# Patient Record
Sex: MALE | Race: WHITE | NOT HISPANIC OR LATINO | Employment: OTHER | URBAN - METROPOLITAN AREA
[De-identification: names, ages, dates, MRNs, and addresses within clinical notes are randomized per-mention and may not be internally consistent; named-entity substitution may affect disease eponyms.]

---

## 2022-06-10 DIAGNOSIS — R27.0 ATAXIA: ICD-10-CM

## 2022-06-10 DIAGNOSIS — R25.2 SPASTICITY: ICD-10-CM

## 2022-06-10 DIAGNOSIS — R26.9 ABNORMAL GAIT: ICD-10-CM

## 2022-06-10 DIAGNOSIS — R56.9 SEIZURE: Primary | ICD-10-CM

## 2022-06-22 DIAGNOSIS — R25.2 SPASTICITY: Primary | ICD-10-CM

## 2022-06-30 ENCOUNTER — HOSPITAL ENCOUNTER (OUTPATIENT)
Dept: RADIOLOGY | Facility: HOSPITAL | Age: 32
Discharge: HOME | End: 2022-06-30
Attending: FAMILY MEDICINE
Payer: MEDICAID

## 2022-06-30 DIAGNOSIS — R27.0 ATAXIA: ICD-10-CM

## 2022-06-30 DIAGNOSIS — R26.9 ABNORMAL GAIT: ICD-10-CM

## 2022-06-30 DIAGNOSIS — R56.9 SEIZURE: ICD-10-CM

## 2022-06-30 DIAGNOSIS — R25.2 SPASTICITY: ICD-10-CM

## 2022-06-30 PROCEDURE — 25500020 PHARM REV CODE 255: Mod: PO

## 2022-06-30 PROCEDURE — 70553 MRI BRAIN STEM W/O & W/DYE: CPT | Mod: TC,PO

## 2022-06-30 PROCEDURE — A9585 GADOBUTROL INJECTION: HCPCS | Mod: PO

## 2022-06-30 RX ORDER — GADOBUTROL 604.72 MG/ML
6.5 INJECTION INTRAVENOUS
Status: COMPLETED | OUTPATIENT
Start: 2022-06-30 | End: 2022-06-30

## 2022-06-30 RX ADMIN — GADOBUTROL 6.5 ML: 604.72 INJECTION INTRAVENOUS at 09:06

## 2022-07-14 ENCOUNTER — HOSPITAL ENCOUNTER (OUTPATIENT)
Dept: RADIOLOGY | Facility: HOSPITAL | Age: 32
Discharge: HOME | End: 2022-07-14
Attending: NURSE PRACTITIONER
Payer: MEDICAID

## 2022-07-14 DIAGNOSIS — R25.2 SPASTICITY: ICD-10-CM

## 2022-07-14 PROCEDURE — 72141 MRI NECK SPINE W/O DYE: CPT | Mod: TC,PO

## 2022-11-08 ENCOUNTER — OFFICE VISIT (OUTPATIENT)
Dept: NEUROSURGERY | Facility: CLINIC | Age: 32
End: 2022-11-08
Payer: MEDICAID

## 2022-11-08 VITALS — DIASTOLIC BLOOD PRESSURE: 87 MMHG | SYSTOLIC BLOOD PRESSURE: 136 MMHG | HEART RATE: 62 BPM

## 2022-11-08 DIAGNOSIS — M54.12 CERVICAL RADICULOPATHY: Primary | ICD-10-CM

## 2022-11-08 DIAGNOSIS — R47.1 DYSARTHRIA: ICD-10-CM

## 2022-11-08 PROCEDURE — 3075F PR MOST RECENT SYSTOLIC BLOOD PRESS GE 130-139MM HG: ICD-10-PCS | Mod: CPTII,S$GLB,, | Performed by: NEUROLOGICAL SURGERY

## 2022-11-08 PROCEDURE — 99205 OFFICE O/P NEW HI 60 MIN: CPT | Mod: S$GLB,,, | Performed by: NEUROLOGICAL SURGERY

## 2022-11-08 PROCEDURE — 3075F SYST BP GE 130 - 139MM HG: CPT | Mod: CPTII,S$GLB,, | Performed by: NEUROLOGICAL SURGERY

## 2022-11-08 PROCEDURE — 99205 PR OFFICE/OUTPT VISIT, NEW, LEVL V, 60-74 MIN: ICD-10-PCS | Mod: S$GLB,,, | Performed by: NEUROLOGICAL SURGERY

## 2022-11-08 PROCEDURE — 3079F PR MOST RECENT DIASTOLIC BLOOD PRESSURE 80-89 MM HG: ICD-10-PCS | Mod: CPTII,S$GLB,, | Performed by: NEUROLOGICAL SURGERY

## 2022-11-08 PROCEDURE — 3079F DIAST BP 80-89 MM HG: CPT | Mod: CPTII,S$GLB,, | Performed by: NEUROLOGICAL SURGERY

## 2022-11-08 NOTE — PROGRESS NOTES
HPI:  This is a 32-year-old gentleman with an unusual past neurological history.  He is minimally verbal and history is provided by his stepfather.  He relates that in April 2022 while living in [place] the patient had multiple seizures and since that time has been minimally verbal with dysarthria, has severe photophobia, spasticity involving bilateral arms and hands, shuffling gait, screaming outbursts when his arms are touched, and diffuse neck, back, and extremity pain.  Per his father in law he was normal, employed, maintained a family prior to the seizure activity in April.  He has a history of schizophrenia and bipolar disorder.  He is currently taking Keppra, Quintipine, baclofen, and gabapentin.  MRI of the brain shows no abnormality.  An MRI of the cervical spine demonstrates left C3-4 facet hypertrophy with contiguous synovial cyst with mild-to-moderate effacement of the left hemicord and severe left C3-4 foraminal stenosis.  No cord signal change.  No other areas of neural element compromise in the cervical spine.    EMG/NC was attempted with all extremities with the patient was unable to tolerate    History of recreational drug use but denies alcohol marijuana or other drugs prior to seizure.  No history of febrile seizures, head trauma, CNS infection, family history of epilepsy.    Smoking status:  Smokes approximately half pack per day despite hand contractures  Past Medical History:   Diagnosis Date    Bipolar disorder, unspecified     Schizophrenia, unspecified     Seizures       History reviewed. No pertinent surgical history.       Review of Systems: All systems reviewed and are as above or otherwise negative.    General: well developed, well nourished, no distress.   Head: normocephalic, atraumatic  Eyes: pupils equal, round, reactive to light with accomodation, EOMI.   Neck: trachea midline. No JVD  Cardiovascular: No LE edema   Pulmonary: normal respirations, no signs of respiratory  distress  Abdomen: soft, non-distended, not tender to palpation  Sensory: intact to light touch throughout  Extremities: No bruising, deformity  Skin: Skin is warm, dry and intact.    Motor Strength: Moves all extremities spontaneously with good tone.  Full strength upper and lower extremities. No abnormal movements seen.     Strength  Deltoids Triceps Biceps Wrist Extension Wrist Flexion Hand    Upper: R 5/5 5/5 5/5 Unable to assess Unable to assess Unable to assess    L 5/5 5/5 5/5 Unable to assess Unable to assess Unable to assess     Iliopsoas Quadriceps Knee  Flexion Tibialis  anterior Gastro- cnemius EHL   Lower: R 5/5 5/5 5/5 5/5 5/5 5/5    L 5/5 5/5 5/5 5/5 5/5 5/5     Neurologic: Alert and oriented. Thought content appropriate.  GCS: Motor: 6/Verbal: 5/Eyes: 4 GCS Total: 15  Mental Status: Awake, Alert, Oriented x 4  Language: No aphasia  Speech: No dysarthria  Cranial nerves: face symmetric, tongue midline, CN II-XII grossly intact.     Pronator Drift: no drift noted  Finger-to-nose: Intact bilaterally  DTR's: 2 + and symmetric in UE and LE  Mathew: absent  Clonus: absent  Babinski: absent    Gait: normal    Tandem Gait: No difficulty           Able to walk on heels & toes    Cervical Spine  ROM: full    Nontender to palpation    Lumbar Spine   ROM: full   Nontender to palpation    Pain on Hip ROM: Negative  Straight leg raise: negative bilaterally     SI Joint tenderness: Negative bilaterally   CAMILLE: Negative bilaterally  Thigh Thrust: Negative bilaterally  Gaenslen: Negative bilaterally    Significant Exam Findings:  Limited neuro exam, patient is minimally cooperative.  Appears to be oriented to to person place and time.  No obvious distress.  Verbal responses limited to a slurred yes or no.   Unable to assess Mathew's, clonus, Babinski.  Bilateral hand contractures without obvious muscle loss.  Grossly moves all extremities without focal deficit.  Reflexes 2/4 in all extremities.  Stands without  assistance.  Shuffling, antalgic gait.    Significant Radiology Findings:  As above.  MRI cervical reviewed in detail with the patient and his father in law.    Analysis:  I explained to the patient and his father in law that the left C3-4 synovial cyst would not account for all the above symptoms.  I explained that it may correlate with left neck pain, left shoulder pain, and possibly some degree of gait disturbance.  His seizure activity, speech difficulty, photophobia, bilateral hand contractures, diffuse back and right leg pain are not accounted for with this finding.  Consequently, it is unclear if laminectomy with resection of the cyst and instrumented fusion would be beneficial.  I did outline the potential benefits and risks and recommended they carefully consider if surgery is how they wished to proceed.  They plan to discuss further with their neurologist.  We will be glad to see him back for further discussion as needed.    [unfilled]   There are no diagnoses linked to this encounter.     No follow-ups on file.

## 2022-11-11 ENCOUNTER — TELEPHONE (OUTPATIENT)
Dept: NEUROSURGERY | Facility: CLINIC | Age: 32
End: 2022-11-11
Payer: MEDICAID

## 2022-11-11 NOTE — TELEPHONE ENCOUNTER
Returned call to pt's mother. She reports that pt would like to pursue surgery. Schedule appt to sign consent. She voiced understanding to appt details.

## 2022-11-11 NOTE — TELEPHONE ENCOUNTER
----- Message from Taylor Espinal RN sent at 11/10/2022  4:27 PM CST -----    ----- Message -----  From: Stacey M Lefort  Sent: 11/10/2022  12:58 PM CST  To: Taylor Espinal RN    Pt would like to schedule surgery as soon as possible. He can be reached at [phone]. Thank you.

## 2022-11-14 DIAGNOSIS — M48.02 SPINAL STENOSIS, CERVICAL REGION: Primary | ICD-10-CM

## 2022-12-16 ENCOUNTER — HOSPITAL ENCOUNTER (OUTPATIENT)
Dept: RADIOLOGY | Facility: HOSPITAL | Age: 32
Discharge: HOME | End: 2022-12-16
Attending: NEUROLOGICAL SURGERY
Payer: MEDICAID

## 2022-12-16 DIAGNOSIS — M48.02 SPINAL STENOSIS, CERVICAL REGION: ICD-10-CM

## 2022-12-16 PROCEDURE — 72125 CT NECK SPINE W/O DYE: CPT | Mod: 26,,, | Performed by: RADIOLOGY

## 2022-12-16 PROCEDURE — 72052 X-RAY EXAM NECK SPINE 6/>VWS: CPT | Mod: TC,FY

## 2022-12-16 PROCEDURE — 72052 X-RAY EXAM NECK SPINE 6/>VWS: CPT | Mod: 26,,, | Performed by: RADIOLOGY

## 2022-12-16 PROCEDURE — 72125 CT CERVICAL SPINE WITHOUT CONTRAST: ICD-10-PCS | Mod: 26,,, | Performed by: RADIOLOGY

## 2022-12-16 PROCEDURE — 72052 XR CERVICAL SPINE 5 VIEW WITH FLEX AND EXT: ICD-10-PCS | Mod: 26,,, | Performed by: RADIOLOGY

## 2022-12-16 PROCEDURE — 72125 CT NECK SPINE W/O DYE: CPT | Mod: TC

## 2022-12-19 ENCOUNTER — OFFICE VISIT (OUTPATIENT)
Dept: NEUROSURGERY | Facility: CLINIC | Age: 32
End: 2022-12-19
Payer: MEDICAID

## 2022-12-19 VITALS — DIASTOLIC BLOOD PRESSURE: 77 MMHG | HEART RATE: 60 BPM | HEIGHT: 68 IN | SYSTOLIC BLOOD PRESSURE: 128 MMHG

## 2022-12-19 DIAGNOSIS — M50.20 CERVICAL DISC DISPLACEMENT: ICD-10-CM

## 2022-12-19 DIAGNOSIS — M54.12 CERVICAL RADICULOPATHY: Primary | ICD-10-CM

## 2022-12-19 DIAGNOSIS — M50.30 DDD (DEGENERATIVE DISC DISEASE), CERVICAL: ICD-10-CM

## 2022-12-19 DIAGNOSIS — M48.02 CERVICAL STENOSIS OF SPINAL CANAL: ICD-10-CM

## 2022-12-19 PROCEDURE — 3078F PR MOST RECENT DIASTOLIC BLOOD PRESSURE < 80 MM HG: ICD-10-PCS | Mod: CPTII,S$GLB,, | Performed by: NEUROLOGICAL SURGERY

## 2022-12-19 PROCEDURE — 1160F RVW MEDS BY RX/DR IN RCRD: CPT | Mod: CPTII,S$GLB,, | Performed by: NEUROLOGICAL SURGERY

## 2022-12-19 PROCEDURE — 99215 OFFICE O/P EST HI 40 MIN: CPT | Mod: S$GLB,,, | Performed by: NEUROLOGICAL SURGERY

## 2022-12-19 PROCEDURE — 3078F DIAST BP <80 MM HG: CPT | Mod: CPTII,S$GLB,, | Performed by: NEUROLOGICAL SURGERY

## 2022-12-19 PROCEDURE — 1160F PR REVIEW ALL MEDS BY PRESCRIBER/CLIN PHARMACIST DOCUMENTED: ICD-10-PCS | Mod: CPTII,S$GLB,, | Performed by: NEUROLOGICAL SURGERY

## 2022-12-19 PROCEDURE — 3074F SYST BP LT 130 MM HG: CPT | Mod: CPTII,S$GLB,, | Performed by: NEUROLOGICAL SURGERY

## 2022-12-19 PROCEDURE — 1159F MED LIST DOCD IN RCRD: CPT | Mod: CPTII,S$GLB,, | Performed by: NEUROLOGICAL SURGERY

## 2022-12-19 PROCEDURE — 1159F PR MEDICATION LIST DOCUMENTED IN MEDICAL RECORD: ICD-10-PCS | Mod: CPTII,S$GLB,, | Performed by: NEUROLOGICAL SURGERY

## 2022-12-19 PROCEDURE — 99215 PR OFFICE/OUTPT VISIT, EST, LEVL V, 40-54 MIN: ICD-10-PCS | Mod: S$GLB,,, | Performed by: NEUROLOGICAL SURGERY

## 2022-12-19 PROCEDURE — 3074F PR MOST RECENT SYSTOLIC BLOOD PRESSURE < 130 MM HG: ICD-10-PCS | Mod: CPTII,S$GLB,, | Performed by: NEUROLOGICAL SURGERY

## 2022-12-19 RX ORDER — BACLOFEN 10 MG/1
10 TABLET ORAL 3 TIMES DAILY
COMMUNITY
Start: 2022-11-22

## 2022-12-19 RX ORDER — QUETIAPINE FUMARATE 100 MG/1
100 TABLET, FILM COATED ORAL NIGHTLY
COMMUNITY
Start: 2022-12-18

## 2022-12-19 RX ORDER — GABAPENTIN 100 MG/1
100 CAPSULE ORAL 3 TIMES DAILY
COMMUNITY
Start: 2022-12-17 | End: 2023-04-17 | Stop reason: SDUPTHER

## 2022-12-19 RX ORDER — LEVETIRACETAM 500 MG/1
500 TABLET ORAL 2 TIMES DAILY
COMMUNITY
Start: 2022-11-22

## 2022-12-19 NOTE — PROGRESS NOTES
Interval history 12/19/2022:  The patient returns and is accompanied by his father in law.  After careful consideration he has elected to proceed with surgery as outlined below.  He reports increased left neck pain with radiation into the left arm.  He denies new neurologic complaints.      Neurologic exam is stable     MRI, CT, dynamic x-rays cervical spine reviewed.  Right disc protrusion with effacement of the right hemicord at C4-5.  Right foraminal stenosis C4-5.  Left C3-4 extradural mass with canal stenosis and severe left foraminal stenosis.  Anterior listhesis C3 on C4 with no change in flexion-extension.    Analysis:  I reviewed the goals of C3-4, C4-5 laminectomy, foraminotomy, instrumented fusion in detail.  Garo and his power of  voiced understanding and reiterated the stated the goal of surgery as possible pain relief and possible improved left hand function.  No guarantees were given.  They voiced clear understanding that all of his other neurologic issues are unlikely to be improved with posterior cervical decompression with instrumented fusion.  I outlined the material risks, anticipated postoperative course, and treatment alternatives.  He declines a stronger analgesic for now.  Continue alternating with Tylenol and ibuprofen.  Discontinue ibuprofen 10 days prior to date of surgery.    Phong was seen today for consent.    Diagnoses and all orders for this visit:    Cervical radiculopathy    Cervical stenosis of spinal canal    DDD (degenerative disc disease), cervical    Cervical disc displacement      I spent a total of 40 minutes on the day of the visit.  This includes face to face time and non-face to face time preparing to see the patient (eg, review of tests), obtaining and/or reviewing separately obtained history, documenting clinical information in the electronic or other health record, independently interpreting results and communicating results to the  patient/family/caregiver, or care coordinator.      HPI:  This is a 32-year-old gentleman with an unusual past neurological history.  He is minimally verbal and history is provided by his stepfather.  He relates that in April 2022 while living in [place] the patient had multiple seizures and since that time has been minimally verbal with dysarthria, has severe photophobia, spasticity involving bilateral arms and hands, shuffling gait, screaming outbursts when his arms are touched, and diffuse neck, back, and extremity pain.  Per his father in law he was normal, employed, maintained a family prior to the seizure activity in April.  He has a history of schizophrenia and bipolar disorder.  He is currently taking Keppra, Quintipine, baclofen, and gabapentin.  MRI of the brain shows no abnormality.  An MRI of the cervical spine demonstrates left C3-4 facet hypertrophy with contiguous synovial cyst with mild-to-moderate effacement of the left hemicord and severe left C3-4 foraminal stenosis.  No cord signal change.  No other areas of neural element compromise in the cervical spine.     EMG/NC was attempted with all extremities with the patient was unable to tolerate     History of recreational drug use but denies alcohol marijuana or other drugs prior to seizure.  No history of febrile seizures, head trauma, CNS infection, family history of epilepsy.     Smoking status:  Smokes approximately half pack per day despite hand contractures       Past Medical History:   Diagnosis Date    Bipolar disorder, unspecified      Schizophrenia, unspecified      Seizures        History reviewed. No pertinent surgical history.     Review of Systems: All systems reviewed and are as above or otherwise negative.     General: well developed, well nourished, no distress.   Head: normocephalic, atraumatic  Eyes: pupils equal, round, reactive to light with accomodation, EOMI.   Neck: trachea midline. No JVD  Cardiovascular: No LE edema    Pulmonary: normal respirations, no signs of respiratory distress  Abdomen: soft, non-distended, not tender to palpation  Sensory: intact to light touch throughout  Extremities: No bruising, deformity  Skin: Skin is warm, dry and intact.     Motor Strength: Moves all extremities spontaneously with good tone.  Full strength upper and lower extremities. No abnormal movements seen.      Strength   Deltoids Triceps Biceps Wrist Extension Wrist Flexion Hand    Upper: R 5/5 5/5 5/5 Unable to assess Unable to assess Unable to assess     L 5/5 5/5 5/5 Unable to assess Unable to assess Unable to assess       Iliopsoas Quadriceps Knee  Flexion Tibialis  anterior Gastro- cnemius EHL   Lower: R 5/5 5/5 5/5 5/5 5/5 5/5     L 5/5 5/5 5/5 5/5 5/5 5/5      Neurologic: Alert and oriented. Thought content appropriate.  GCS: Motor: 6/Verbal: 5/Eyes: 4 GCS Total: 15  Mental Status: Awake, Alert, Oriented x 4  Language: No aphasia  Speech: No dysarthria  Cranial nerves: face symmetric, tongue midline, CN II-XII grossly intact.      Pronator Drift: no drift noted  Finger-to-nose: Intact bilaterally  DTR's: 2 + and symmetric in UE and LE  Mathew: absent  Clonus: absent  Babinski: absent     Gait: normal                  Tandem Gait: No difficulty                Able to walk on heels & toes     Cervical Spine  ROM: full                       Nontender to palpation     Lumbar Spine   ROM: full           Nontender to palpation     Pain on Hip ROM: Negative  Straight leg raise: negative bilaterally      SI Joint tenderness: Negative bilaterally          CAMILLE: Negative bilaterally  Thigh Thrust: Negative bilaterally  Gaenslen: Negative bilaterally     Significant Exam Findings:  Limited neuro exam, patient is minimally cooperative.  Appears to be oriented to to person place and time.  No obvious distress.  Verbal responses limited to a slurred yes or no.   Unable to assess Mathew's, clonus, Babinski.  Bilateral hand contractures  without obvious muscle loss.  Grossly moves all extremities without focal deficit.  Reflexes 2/4 in all extremities.  Stands without assistance.  Shuffling, antalgic gait.     Significant Radiology Findings:  As above.  MRI cervical reviewed in detail with the patient and his father in law.     Analysis:  I explained to the patient and his father in law that the left C3-4 synovial cyst would not account for all the above symptoms.  I explained that it may correlate with left neck pain, left shoulder pain, and possibly some degree of gait disturbance.  His seizure activity, speech difficulty, photophobia, bilateral hand contractures, diffuse back and right leg pain are not accounted for with this finding.  Consequently, it is unclear if laminectomy with resection of the cyst and instrumented fusion would be beneficial.  I did outline the potential benefits and risks and recommended they carefully consider if surgery is how they wished to proceed.  They plan to discuss further with their neurologist.  We will be glad to see him back for further discussion as needed.

## 2022-12-27 DIAGNOSIS — M48.02 CERVICAL STENOSIS OF SPINE: Primary | ICD-10-CM

## 2022-12-27 DIAGNOSIS — Z01.818 PRE-OP EXAM: ICD-10-CM

## 2022-12-27 RX ORDER — MUPIROCIN 20 MG/G
OINTMENT TOPICAL
Status: CANCELLED | OUTPATIENT
Start: 2022-12-27

## 2022-12-27 RX ORDER — MUPIROCIN 20 MG/G
1 OINTMENT TOPICAL 2 TIMES DAILY
Status: CANCELLED | OUTPATIENT
Start: 2022-12-27 | End: 2022-12-28

## 2023-01-04 ENCOUNTER — HOSPITAL ENCOUNTER (OUTPATIENT)
Dept: PREADMISSION TESTING | Facility: HOSPITAL | Age: 33
Discharge: HOME | End: 2023-01-04
Attending: NEUROLOGICAL SURGERY
Payer: MEDICAID

## 2023-01-04 ENCOUNTER — HOSPITAL ENCOUNTER (OUTPATIENT)
Dept: RADIOLOGY | Facility: HOSPITAL | Age: 33
Discharge: HOME | End: 2023-01-04
Attending: NEUROLOGICAL SURGERY
Payer: MEDICAID

## 2023-01-04 VITALS
BODY MASS INDEX: 23.39 KG/M2 | OXYGEN SATURATION: 100 % | HEIGHT: 68 IN | HEART RATE: 62 BPM | SYSTOLIC BLOOD PRESSURE: 133 MMHG | RESPIRATION RATE: 12 BRPM | TEMPERATURE: 99 F | WEIGHT: 154.31 LBS | DIASTOLIC BLOOD PRESSURE: 81 MMHG

## 2023-01-04 DIAGNOSIS — Z01.818 PRE-OP EXAM: ICD-10-CM

## 2023-01-04 DIAGNOSIS — M48.02 CERVICAL STENOSIS OF SPINE: ICD-10-CM

## 2023-01-04 PROCEDURE — 71046 X-RAY EXAM CHEST 2 VIEWS: CPT | Mod: TC

## 2023-01-04 PROCEDURE — 93005 ELECTROCARDIOGRAM TRACING: CPT | Performed by: INTERNAL MEDICINE

## 2023-01-04 PROCEDURE — 93010 ELECTROCARDIOGRAM REPORT: CPT | Mod: ,,, | Performed by: INTERNAL MEDICINE

## 2023-01-04 PROCEDURE — 93010 EKG 12-LEAD: ICD-10-PCS | Mod: ,,, | Performed by: INTERNAL MEDICINE

## 2023-01-04 NOTE — DISCHARGE INSTRUCTIONS
INSTRUCTIONS  To confirm your doctor has scheduled your surgery for:    COVID TESTING SCHEDULED:     Morning of surgery please check in with registration near Parking Garage Entrance then proceed to Outpatient Surgery Department.    Preop nurses will call the afternoon prior to surgery between 4:00 and 6:00 PM with your final arrival time.  PLEASE NOTE:  The surgery schedule has many variables which may affect the time of your surgery case. Family members should be available if your surgery time changes. Plan to be here the day of your procedure between 4-6 hours.    TAKE ONLY THESE MEDICATIONS WITH A SMALL SIP OF WATER THE MORNING OF SURGERY: see list    DO NOT TAKE THESE MEDICATIONS 5-7 DAYS PRIOR to your procedure per your surgeon's request: ASPIRIN, ALEVE, BC powder, JAZMIN SELTZER, IBUPROFEN, FISH OIL, VITAMIN E, OR HERBALS   (May take Tylenol)    If you are prescribed any types of blood thinners (Aspirin, Coumadin, Plavix, Pradaxa, Xarelto, Aggrenox, Effient, Eliquis, Savasya, Brilinta or any other), please ask your surgeon how many days before scheduled procedure should you stop taking them. You may also need to verify with prescribing physician if it is OK to stop your blood thinners.      INSTRUCTIONS IMPORTANT!!  Do not eat or drink anything after midnight.  ONLY if you are diabetic, check your sugar in the morning before your procedure.  Do not smoke, vape or drink alcoholic beverages 24 hours prior to your procedure.  Shower the night before AND the morning of your procedure with a Chlorhexidine wash such as hibiclens or Dial antibacterial soap from neck down. You may use your own shampoo and face wash. This helps your skin to be as bacteria free as possible.  If you wear contact lenses, dentures, hearing aids or glasses, bring a container to put them in and give to a family member.    Please leave all jewelry, piercings and valuables at home.  DO NOT remove hair from the surgery site.   If your condition  changes such as fever, cough, etc, please notify your surgeon.   ONLY if you have been diagnosed with sleep apnea please bring your C-PAP machine.  ONLY if you wear home oxygen please bring your portable oxygen tank the day of your procedure.   ONLY for patients requiring bowel prep, written instructions will be given by your doctor's office.  ONLY if you have a neuro stimulator, please bring the controller with you the morning of surgery  Make arrangements in advance for transportation home by a responsible adult.  You must make arrangements for transportation, TAXI'S, UBER'S OR LYFTS ARE NOT ALLOWED.        If you have any questions about these instructions, call Pre-Op Admit  Nursing at [phone] or the Pre-Op Day Surgery Unit at [phone].

## 2023-01-12 ENCOUNTER — LAB VISIT (OUTPATIENT)
Dept: LAB | Facility: HOSPITAL | Age: 33
End: 2023-01-12
Attending: NEUROLOGICAL SURGERY
Payer: MEDICAID

## 2023-01-12 DIAGNOSIS — Z01.818 PRE-OP EXAM: ICD-10-CM

## 2023-01-12 DIAGNOSIS — M48.02 CERVICAL STENOSIS OF SPINE: ICD-10-CM

## 2023-01-12 LAB
ABO + RH BLD: NORMAL
ANION GAP SERPL CALC-SCNC: 10 MMOL/L (ref 8–16)
APTT BLDCRRT: 26.7 SEC (ref 21–32)
BASOPHILS # BLD AUTO: 0.03 K/UL (ref 0–0.2)
BASOPHILS NFR BLD: 0.5 % (ref 0–1.9)
BILIRUB UR QL STRIP: NEGATIVE
BLD GP AB SCN CELLS X3 SERPL QL: NORMAL
BUN SERPL-MCNC: 17 MG/DL (ref 6–20)
CALCIUM SERPL-MCNC: 9 MG/DL (ref 8.7–10.5)
CHLORIDE SERPL-SCNC: 102 MMOL/L (ref 95–110)
CLARITY UR: CLEAR
CO2 SERPL-SCNC: 26 MMOL/L (ref 23–29)
COLOR UR: YELLOW
CREAT SERPL-MCNC: 0.9 MG/DL (ref 0.5–1.4)
DIFFERENTIAL METHOD: NORMAL
EOSINOPHIL # BLD AUTO: 0.2 K/UL (ref 0–0.5)
EOSINOPHIL NFR BLD: 3.7 % (ref 0–8)
ERYTHROCYTE [DISTWIDTH] IN BLOOD BY AUTOMATED COUNT: 12.6 % (ref 11.5–14.5)
EST. GFR  (NO RACE VARIABLE): >60 ML/MIN/1.73 M^2
GLUCOSE SERPL-MCNC: 89 MG/DL (ref 70–110)
GLUCOSE UR QL STRIP: NEGATIVE
HCT VFR BLD AUTO: 43.1 % (ref 40–54)
HGB BLD-MCNC: 14.5 G/DL (ref 14–18)
HGB UR QL STRIP: NEGATIVE
IMM GRANULOCYTES # BLD AUTO: 0.01 K/UL (ref 0–0.04)
IMM GRANULOCYTES NFR BLD AUTO: 0.2 % (ref 0–0.5)
INR PPP: 1 (ref 0.8–1.2)
KETONES UR QL STRIP: NEGATIVE
LEUKOCYTE ESTERASE UR QL STRIP: NEGATIVE
LYMPHOCYTES # BLD AUTO: 2 K/UL (ref 1–4.8)
LYMPHOCYTES NFR BLD: 32.1 % (ref 18–48)
MCH RBC QN AUTO: 30 PG (ref 27–31)
MCHC RBC AUTO-ENTMCNC: 33.6 G/DL (ref 32–36)
MCV RBC AUTO: 89 FL (ref 82–98)
MONOCYTES # BLD AUTO: 0.6 K/UL (ref 0.3–1)
MONOCYTES NFR BLD: 9.3 % (ref 4–15)
NEUTROPHILS # BLD AUTO: 3.3 K/UL (ref 1.8–7.7)
NEUTROPHILS NFR BLD: 54.2 % (ref 38–73)
NITRITE UR QL STRIP: NEGATIVE
NRBC BLD-RTO: 0 /100 WBC
PH UR STRIP: 6 [PH] (ref 5–8)
PLATELET # BLD AUTO: 185 K/UL (ref 150–450)
PMV BLD AUTO: 9.6 FL (ref 9.2–12.9)
POTASSIUM SERPL-SCNC: 3.9 MMOL/L (ref 3.5–5.1)
PROT UR QL STRIP: NEGATIVE
PROTHROMBIN TIME: 10.6 SEC (ref 9–12.5)
RBC # BLD AUTO: 4.84 M/UL (ref 4.6–6.2)
SODIUM SERPL-SCNC: 138 MMOL/L (ref 136–145)
SP GR UR STRIP: 1.02 (ref 1–1.03)
URN SPEC COLLECT METH UR: NORMAL
UROBILINOGEN UR STRIP-ACNC: NEGATIVE EU/DL
WBC # BLD AUTO: 6.16 K/UL (ref 3.9–12.7)

## 2023-01-12 PROCEDURE — 85025 COMPLETE CBC W/AUTO DIFF WBC: CPT | Performed by: NEUROLOGICAL SURGERY

## 2023-01-12 PROCEDURE — 85610 PROTHROMBIN TIME: CPT | Performed by: NEUROLOGICAL SURGERY

## 2023-01-12 PROCEDURE — 85730 THROMBOPLASTIN TIME PARTIAL: CPT | Performed by: NEUROLOGICAL SURGERY

## 2023-01-12 PROCEDURE — 86900 BLOOD TYPING SEROLOGIC ABO: CPT | Performed by: NEUROLOGICAL SURGERY

## 2023-01-12 PROCEDURE — 81003 URINALYSIS AUTO W/O SCOPE: CPT | Performed by: NEUROLOGICAL SURGERY

## 2023-01-12 PROCEDURE — 80048 BASIC METABOLIC PNL TOTAL CA: CPT | Performed by: NEUROLOGICAL SURGERY

## 2023-01-12 PROCEDURE — 36415 COLL VENOUS BLD VENIPUNCTURE: CPT | Performed by: NEUROLOGICAL SURGERY

## 2023-01-12 NOTE — CARE UPDATE
Per lab personnel, pt was unable to produce urine specimen this am - was given a container and stated he would bring it later today

## 2023-01-16 ENCOUNTER — TELEPHONE (OUTPATIENT)
Dept: NEUROSURGERY | Facility: CLINIC | Age: 33
End: 2023-01-16
Payer: MEDICAID

## 2023-01-16 NOTE — TELEPHONE ENCOUNTER
Contacted pt and spoke with pt's mother to confirm that pt has not taken any blood thinners/anti inflammatories in the past 10 days and does not have any open wounds/rashes or new medical problems since evaluated by Dr. Levin. Reminded pt to be npo after midnight and to contact our office with any questions or concerns. Pt's mother voiced understanding.

## 2023-01-17 ENCOUNTER — HOSPITAL ENCOUNTER (OUTPATIENT)
Facility: HOSPITAL | Age: 33
LOS: 1 days | Discharge: HOME | End: 2023-01-19
Attending: NEUROLOGICAL SURGERY | Admitting: NEUROLOGICAL SURGERY
Payer: MEDICAID

## 2023-01-17 ENCOUNTER — ANESTHESIA (OUTPATIENT)
Dept: SURGERY | Facility: HOSPITAL | Age: 33
End: 2023-01-17
Payer: MEDICAID

## 2023-01-17 ENCOUNTER — ANESTHESIA EVENT (OUTPATIENT)
Dept: SURGERY | Facility: HOSPITAL | Age: 33
End: 2023-01-17
Payer: MEDICAID

## 2023-01-17 DIAGNOSIS — M48.02 CERVICAL STENOSIS OF SPINAL CANAL: Primary | ICD-10-CM

## 2023-01-17 DIAGNOSIS — M48.02 CERVICAL STENOSIS OF SPINE: ICD-10-CM

## 2023-01-17 DIAGNOSIS — R52 PAIN: ICD-10-CM

## 2023-01-17 PROCEDURE — 20930 PR ALLOGRAFT FOR SPINE SURGERY ONLY MORSELIZED: ICD-10-PCS | Mod: ,,, | Performed by: NEUROLOGICAL SURGERY

## 2023-01-17 PROCEDURE — 63600175 PHARM REV CODE 636 W HCPCS: Performed by: ANESTHESIOLOGY

## 2023-01-17 PROCEDURE — 36000711: Performed by: NEUROLOGICAL SURGERY

## 2023-01-17 PROCEDURE — 25000003 PHARM REV CODE 250: Performed by: NURSE ANESTHETIST, CERTIFIED REGISTERED

## 2023-01-17 PROCEDURE — D9220A PRA ANESTHESIA: Mod: ANES,,, | Performed by: ANESTHESIOLOGY

## 2023-01-17 PROCEDURE — 22600 ARTHRD PST TQ 1NTRSPC CRV: CPT | Mod: 51,,, | Performed by: NEUROLOGICAL SURGERY

## 2023-01-17 PROCEDURE — 22614 PR ARTHRODESIS, POST/POSTLAT, SNGL INTERSPACE, EA ADDTL: ICD-10-PCS | Mod: ,,, | Performed by: NEUROLOGICAL SURGERY

## 2023-01-17 PROCEDURE — 63045 PR LAMINEC/FACETECT/FORAMIN,CERVICAL 1 SEG: ICD-10-PCS | Mod: ,,, | Performed by: NEUROLOGICAL SURGERY

## 2023-01-17 PROCEDURE — 25000003 PHARM REV CODE 250: Performed by: NEUROLOGICAL SURGERY

## 2023-01-17 PROCEDURE — 20936 PR AUTOGRAFT SPINE SURGERY LOCAL FROM SAME INCISION: ICD-10-PCS | Mod: ,,, | Performed by: NEUROLOGICAL SURGERY

## 2023-01-17 PROCEDURE — D9220A PRA ANESTHESIA: Mod: CRNA,,, | Performed by: NURSE ANESTHETIST, CERTIFIED REGISTERED

## 2023-01-17 PROCEDURE — 20930 SP BONE ALGRFT MORSEL ADD-ON: CPT | Mod: ,,, | Performed by: NEUROLOGICAL SURGERY

## 2023-01-17 PROCEDURE — 71000033 HC RECOVERY, INTIAL HOUR: Performed by: NEUROLOGICAL SURGERY

## 2023-01-17 PROCEDURE — 99223 PR INITIAL HOSPITAL CARE,LEVL III: ICD-10-PCS | Mod: 57,,, | Performed by: NEUROLOGICAL SURGERY

## 2023-01-17 PROCEDURE — 63600175 PHARM REV CODE 636 W HCPCS: Performed by: NURSE ANESTHETIST, CERTIFIED REGISTERED

## 2023-01-17 PROCEDURE — 22614 ARTHRD PST TQ 1NTRSPC EA ADD: CPT | Mod: ,,, | Performed by: NEUROLOGICAL SURGERY

## 2023-01-17 PROCEDURE — D9220A PRA ANESTHESIA: ICD-10-PCS | Mod: ANES,,, | Performed by: ANESTHESIOLOGY

## 2023-01-17 PROCEDURE — 00600 ANES PX CRV SPINE&CORD NOS: CPT | Performed by: NEUROLOGICAL SURGERY

## 2023-01-17 PROCEDURE — 99223 1ST HOSP IP/OBS HIGH 75: CPT | Mod: 57,,, | Performed by: NEUROLOGICAL SURGERY

## 2023-01-17 PROCEDURE — D9220A PRA ANESTHESIA: ICD-10-PCS | Mod: CRNA,,, | Performed by: NURSE ANESTHETIST, CERTIFIED REGISTERED

## 2023-01-17 PROCEDURE — C1762 CONN TISS, HUMAN(INC FASCIA): HCPCS | Performed by: NEUROLOGICAL SURGERY

## 2023-01-17 PROCEDURE — 22842 INSERT SPINE FIXATION DEVICE: CPT | Mod: ,,, | Performed by: NEUROLOGICAL SURGERY

## 2023-01-17 PROCEDURE — 27201423 OPTIME MED/SURG SUP & DEVICES STERILE SUPPLY: Performed by: NEUROLOGICAL SURGERY

## 2023-01-17 PROCEDURE — C1713 ANCHOR/SCREW BN/BN,TIS/BN: HCPCS | Performed by: NEUROLOGICAL SURGERY

## 2023-01-17 PROCEDURE — 37000008 HC ANESTHESIA 1ST 15 MINUTES: Performed by: NEUROLOGICAL SURGERY

## 2023-01-17 PROCEDURE — 22842 PR POSTERIOR SEGMENTAL INSTRUMENTATION 3-6 VRT SEG: ICD-10-PCS | Mod: ,,, | Performed by: NEUROLOGICAL SURGERY

## 2023-01-17 PROCEDURE — 63045 LAM FACETEC & FORAMOT CRV: CPT | Mod: ,,, | Performed by: NEUROLOGICAL SURGERY

## 2023-01-17 PROCEDURE — 20936 SP BONE AGRFT LOCAL ADD-ON: CPT | Mod: ,,, | Performed by: NEUROLOGICAL SURGERY

## 2023-01-17 PROCEDURE — 63600175 PHARM REV CODE 636 W HCPCS: Performed by: NEUROLOGICAL SURGERY

## 2023-01-17 PROCEDURE — 37000009 HC ANESTHESIA EA ADD 15 MINS: Performed by: NEUROLOGICAL SURGERY

## 2023-01-17 PROCEDURE — 71000039 HC RECOVERY, EACH ADD'L HOUR: Performed by: NEUROLOGICAL SURGERY

## 2023-01-17 PROCEDURE — 36620 ARTERIAL: ICD-10-PCS | Mod: ,,, | Performed by: ANESTHESIOLOGY

## 2023-01-17 PROCEDURE — 22600 PR ARTHRODESIS, POST/POSTLAT, SNGL INTERSPACE, CERVICAL BELOW C2: ICD-10-PCS | Mod: 51,,, | Performed by: NEUROLOGICAL SURGERY

## 2023-01-17 PROCEDURE — 36620 INSERTION CATHETER ARTERY: CPT | Mod: ,,, | Performed by: ANESTHESIOLOGY

## 2023-01-17 PROCEDURE — 36000710: Performed by: NEUROLOGICAL SURGERY

## 2023-01-17 DEVICE — IMPLANTABLE DEVICE: Type: IMPLANTABLE DEVICE | Site: NECK | Status: FUNCTIONAL

## 2023-01-17 RX ORDER — CEFAZOLIN SODIUM 2 G/50ML
2 SOLUTION INTRAVENOUS
Status: COMPLETED | OUTPATIENT
Start: 2023-01-17 | End: 2023-01-18

## 2023-01-17 RX ORDER — AMOXICILLIN 250 MG
2 CAPSULE ORAL NIGHTLY PRN
Status: DISCONTINUED | OUTPATIENT
Start: 2023-01-17 | End: 2023-01-19 | Stop reason: HOSPADM

## 2023-01-17 RX ORDER — ACETAMINOPHEN 10 MG/ML
INJECTION, SOLUTION INTRAVENOUS
Status: DISCONTINUED | OUTPATIENT
Start: 2023-01-17 | End: 2023-01-17

## 2023-01-17 RX ORDER — PROPOFOL 10 MG/ML
VIAL (ML) INTRAVENOUS
Status: DISCONTINUED | OUTPATIENT
Start: 2023-01-17 | End: 2023-01-17

## 2023-01-17 RX ORDER — ONDANSETRON 2 MG/ML
4 INJECTION INTRAMUSCULAR; INTRAVENOUS DAILY PRN
Status: DISCONTINUED | OUTPATIENT
Start: 2023-01-17 | End: 2023-01-17 | Stop reason: HOSPADM

## 2023-01-17 RX ORDER — PROCHLORPERAZINE EDISYLATE 5 MG/ML
5 INJECTION INTRAMUSCULAR; INTRAVENOUS EVERY 6 HOURS PRN
Status: DISCONTINUED | OUTPATIENT
Start: 2023-01-17 | End: 2023-01-19 | Stop reason: HOSPADM

## 2023-01-17 RX ORDER — OXYCODONE HYDROCHLORIDE 5 MG/1
10 TABLET ORAL
Status: DISCONTINUED | OUTPATIENT
Start: 2023-01-17 | End: 2023-01-19 | Stop reason: HOSPADM

## 2023-01-17 RX ORDER — FAMOTIDINE 10 MG/ML
INJECTION INTRAVENOUS
Status: DISCONTINUED | OUTPATIENT
Start: 2023-01-17 | End: 2023-01-17

## 2023-01-17 RX ORDER — ACETAMINOPHEN 325 MG/1
650 TABLET ORAL EVERY 6 HOURS PRN
Status: DISCONTINUED | OUTPATIENT
Start: 2023-01-17 | End: 2023-01-19 | Stop reason: HOSPADM

## 2023-01-17 RX ORDER — DEXAMETHASONE SODIUM PHOSPHATE 4 MG/ML
INJECTION, SOLUTION INTRA-ARTICULAR; INTRALESIONAL; INTRAMUSCULAR; INTRAVENOUS; SOFT TISSUE
Status: DISCONTINUED | OUTPATIENT
Start: 2023-01-17 | End: 2023-01-17

## 2023-01-17 RX ORDER — LIDOCAINE HYDROCHLORIDE 20 MG/ML
INJECTION, SOLUTION EPIDURAL; INFILTRATION; INTRACAUDAL; PERINEURAL
Status: DISCONTINUED | OUTPATIENT
Start: 2023-01-17 | End: 2023-01-17

## 2023-01-17 RX ORDER — MUPIROCIN 20 MG/G
OINTMENT TOPICAL 2 TIMES DAILY
Status: DISCONTINUED | OUTPATIENT
Start: 2023-01-17 | End: 2023-01-19 | Stop reason: HOSPADM

## 2023-01-17 RX ORDER — FENTANYL CITRATE 50 UG/ML
INJECTION, SOLUTION INTRAMUSCULAR; INTRAVENOUS
Status: DISCONTINUED | OUTPATIENT
Start: 2023-01-17 | End: 2023-01-17

## 2023-01-17 RX ORDER — OXYCODONE AND ACETAMINOPHEN 7.5; 325 MG/1; MG/1
1 TABLET ORAL EVERY 6 HOURS
Status: DISCONTINUED | OUTPATIENT
Start: 2023-01-18 | End: 2023-01-19 | Stop reason: HOSPADM

## 2023-01-17 RX ORDER — SODIUM CHLORIDE 9 MG/ML
INJECTION, SOLUTION INTRAVENOUS CONTINUOUS
Status: DISCONTINUED | OUTPATIENT
Start: 2023-01-17 | End: 2023-01-19 | Stop reason: HOSPADM

## 2023-01-17 RX ORDER — MAG HYDROX/ALUMINUM HYD/SIMETH 200-200-20
30 SUSPENSION, ORAL (FINAL DOSE FORM) ORAL EVERY 4 HOURS PRN
Status: DISCONTINUED | OUTPATIENT
Start: 2023-01-17 | End: 2023-01-19 | Stop reason: HOSPADM

## 2023-01-17 RX ORDER — BISACODYL 10 MG
10 SUPPOSITORY, RECTAL RECTAL DAILY
Status: DISCONTINUED | OUTPATIENT
Start: 2023-01-18 | End: 2023-01-19 | Stop reason: HOSPADM

## 2023-01-17 RX ORDER — BACITRACIN 500 [USP'U]/G
OINTMENT TOPICAL
Status: DISCONTINUED | OUTPATIENT
Start: 2023-01-17 | End: 2023-01-17 | Stop reason: HOSPADM

## 2023-01-17 RX ORDER — ONDANSETRON HYDROCHLORIDE 2 MG/ML
INJECTION, SOLUTION INTRAMUSCULAR; INTRAVENOUS
Status: DISCONTINUED | OUTPATIENT
Start: 2023-01-17 | End: 2023-01-17

## 2023-01-17 RX ORDER — ROCURONIUM BROMIDE 10 MG/ML
INJECTION, SOLUTION INTRAVENOUS
Status: DISCONTINUED | OUTPATIENT
Start: 2023-01-17 | End: 2023-01-17

## 2023-01-17 RX ORDER — PROPOFOL 10 MG/ML
VIAL (ML) INTRAVENOUS CONTINUOUS PRN
Status: DISCONTINUED | OUTPATIENT
Start: 2023-01-17 | End: 2023-01-17

## 2023-01-17 RX ORDER — QUETIAPINE FUMARATE 100 MG/1
100 TABLET, FILM COATED ORAL NIGHTLY
Status: DISCONTINUED | OUTPATIENT
Start: 2023-01-17 | End: 2023-01-19 | Stop reason: HOSPADM

## 2023-01-17 RX ORDER — MIDAZOLAM HYDROCHLORIDE 1 MG/ML
INJECTION INTRAMUSCULAR; INTRAVENOUS
Status: DISCONTINUED | OUTPATIENT
Start: 2023-01-17 | End: 2023-01-17

## 2023-01-17 RX ORDER — HYDROMORPHONE HYDROCHLORIDE 1 MG/ML
2 INJECTION, SOLUTION INTRAMUSCULAR; INTRAVENOUS; SUBCUTANEOUS
Status: DISCONTINUED | OUTPATIENT
Start: 2023-01-17 | End: 2023-01-19 | Stop reason: HOSPADM

## 2023-01-17 RX ORDER — LEVETIRACETAM 500 MG/1
500 TABLET ORAL 2 TIMES DAILY
Status: DISCONTINUED | OUTPATIENT
Start: 2023-01-17 | End: 2023-01-19 | Stop reason: HOSPADM

## 2023-01-17 RX ORDER — HYDROMORPHONE HYDROCHLORIDE 1 MG/ML
0.2 INJECTION, SOLUTION INTRAMUSCULAR; INTRAVENOUS; SUBCUTANEOUS EVERY 5 MIN PRN
Status: DISCONTINUED | OUTPATIENT
Start: 2023-01-17 | End: 2023-01-17 | Stop reason: HOSPADM

## 2023-01-17 RX ORDER — ONDANSETRON 4 MG/1
8 TABLET, ORALLY DISINTEGRATING ORAL EVERY 6 HOURS PRN
Status: DISCONTINUED | OUTPATIENT
Start: 2023-01-17 | End: 2023-01-19 | Stop reason: HOSPADM

## 2023-01-17 RX ORDER — BUPIVACAINE HYDROCHLORIDE AND EPINEPHRINE 5; 5 MG/ML; UG/ML
INJECTION, SOLUTION EPIDURAL; INTRACAUDAL; PERINEURAL
Status: DISCONTINUED | OUTPATIENT
Start: 2023-01-17 | End: 2023-01-17 | Stop reason: HOSPADM

## 2023-01-17 RX ORDER — GABAPENTIN 100 MG/1
100 CAPSULE ORAL 3 TIMES DAILY
Status: DISCONTINUED | OUTPATIENT
Start: 2023-01-17 | End: 2023-01-19 | Stop reason: HOSPADM

## 2023-01-17 RX ORDER — BACLOFEN 10 MG/1
10 TABLET ORAL 3 TIMES DAILY
Status: DISCONTINUED | OUTPATIENT
Start: 2023-01-17 | End: 2023-01-19 | Stop reason: HOSPADM

## 2023-01-17 RX ADMIN — SODIUM CHLORIDE: 0.9 INJECTION, SOLUTION INTRAVENOUS at 10:01

## 2023-01-17 RX ADMIN — FAMOTIDINE 20 MG: 10 INJECTION, SOLUTION INTRAVENOUS at 03:01

## 2023-01-17 RX ADMIN — GABAPENTIN 100 MG: 100 CAPSULE ORAL at 09:01

## 2023-01-17 RX ADMIN — HYDROMORPHONE HYDROCHLORIDE 0.2 MG: 1 INJECTION, SOLUTION INTRAMUSCULAR; INTRAVENOUS; SUBCUTANEOUS at 07:01

## 2023-01-17 RX ADMIN — BACLOFEN 10 MG: 10 TABLET ORAL at 09:01

## 2023-01-17 RX ADMIN — SUGAMMADEX 100 MG: 100 INJECTION, SOLUTION INTRAVENOUS at 06:01

## 2023-01-17 RX ADMIN — LIDOCAINE HYDROCHLORIDE 60 MG: 20 INJECTION, SOLUTION EPIDURAL; INFILTRATION; INTRACAUDAL; PERINEURAL at 02:01

## 2023-01-17 RX ADMIN — DEXAMETHASONE SODIUM PHOSPHATE 4 MG: 4 INJECTION, SOLUTION INTRAMUSCULAR; INTRAVENOUS at 04:01

## 2023-01-17 RX ADMIN — ONDANSETRON 4 MG: 2 INJECTION INTRAMUSCULAR; INTRAVENOUS at 06:01

## 2023-01-17 RX ADMIN — SODIUM CHLORIDE, SODIUM LACTATE, POTASSIUM CHLORIDE, AND CALCIUM CHLORIDE: .6; .31; .03; .02 INJECTION, SOLUTION INTRAVENOUS at 02:01

## 2023-01-17 RX ADMIN — PROPOFOL 160 MG: 10 INJECTION, EMULSION INTRAVENOUS at 02:01

## 2023-01-17 RX ADMIN — GLYCOPYRROLATE 0.2 MG: 0.2 INJECTION, SOLUTION INTRAMUSCULAR; INTRAVITREAL at 03:01

## 2023-01-17 RX ADMIN — LEVETIRACETAM 500 MG: 500 TABLET, FILM COATED ORAL at 09:01

## 2023-01-17 RX ADMIN — MIDAZOLAM HYDROCHLORIDE 2 MG: 1 INJECTION, SOLUTION INTRAMUSCULAR; INTRAVENOUS at 02:01

## 2023-01-17 RX ADMIN — PROPOFOL 50 MCG/KG/MIN: 10 INJECTION, EMULSION INTRAVENOUS at 03:01

## 2023-01-17 RX ADMIN — SODIUM CHLORIDE, SODIUM LACTATE, POTASSIUM CHLORIDE, AND CALCIUM CHLORIDE: .6; .31; .03; .02 INJECTION, SOLUTION INTRAVENOUS at 04:01

## 2023-01-17 RX ADMIN — CEFAZOLIN SODIUM 2 G: 2 SOLUTION INTRAVENOUS at 10:01

## 2023-01-17 RX ADMIN — FENTANYL CITRATE 100 MCG: 0.05 INJECTION, SOLUTION INTRAMUSCULAR; INTRAVENOUS at 02:01

## 2023-01-17 RX ADMIN — QUETIAPINE 100 MG: 100 TABLET ORAL at 09:01

## 2023-01-17 RX ADMIN — HYDROMORPHONE HYDROCHLORIDE 2 MG: 1 INJECTION, SOLUTION INTRAMUSCULAR; INTRAVENOUS; SUBCUTANEOUS at 10:01

## 2023-01-17 RX ADMIN — ACETAMINOPHEN 1000 MG: 10 INJECTION, SOLUTION INTRAVENOUS at 03:01

## 2023-01-17 RX ADMIN — METHOCARBAMOL 1000 MG: 100 INJECTION INTRAMUSCULAR; INTRAVENOUS at 09:01

## 2023-01-17 RX ADMIN — MUPIROCIN 1 G: 20 OINTMENT TOPICAL at 10:01

## 2023-01-17 RX ADMIN — ROCURONIUM BROMIDE 50 MG: 10 INJECTION, SOLUTION INTRAVENOUS at 02:01

## 2023-01-17 NOTE — ANESTHESIA PROCEDURE NOTES
Arterial    Diagnosis: Cervical stenosis    Patient location during procedure: done in OR  Procedure start time: 1/17/2023 3:31 PM  Timeout: 1/17/2023 3:30 PM  Procedure end time: 1/17/2023 3:33 PM    Staffing  Authorizing Provider: Elfego Kearns Jr., MD  Performing Provider: Elfego Kearns Jr., MD    Anesthesiologist was present at the time of the procedure.    Preanesthetic Checklist  Completed: patient identified, IV checked, site marked, risks and benefits discussed, surgical consent, monitors and equipment checked, pre-op evaluation, timeout performed and anesthesia consent givenArterial  Skin Prep: chlorhexidine gluconate  Local Infiltration: lidocaine  Orientation: right  Location: radial    Catheter Size: 20 G  Catheter placement by Anatomical landmarks. Heme positive aspiration all ports. Insertion Attempts: 1  Assessment  Dressing: secured with tape and tegaderm and sutured in place and taped  Patient: Tolerated well

## 2023-01-17 NOTE — BRIEF OP NOTE
Date of procedure:  January 17, 2023     Preoperative diagnosis:    1. Cervical spinal canal stenosis C3-4     2. Extradural mass C3-4    3. Cervical spondylosis    4. Seizure disorder     5. Schizophrenia    6. Bipolar disorder    7. Dysarthria    8. Spasticity    Postoperative diagnosis:    1. Same     Procedures:    1. Left C3-4 laminectomy, foraminotomy, resection of extradural mass    2. Bilateral posterior segmental instrumentation C3-4, C4-5     3. Bilateral posterior lateral arthrodesis C3-4, C4-5     4. Harvesting local autograft, implantation of allograft    5. Intraoperative neuro monitoring     Surgeon:  Tobi Levin D.O. MBA     Assistant:  See medical record     Anesthesia:  General     Estimated blood loss:  200 cc     Specimens:  Left C3-4 extradural mass     Complications:  None    The patient tolerated the above procedure well.  He was extubated in the operating room and transferred to the recovery room in stable condition.  The patient's family was updated postoperatively.

## 2023-01-17 NOTE — ANESTHESIA PROCEDURE NOTES
Intubation    Date/Time: 1/17/2023 2:57 PM  Performed by: Cole Spivey CRNA  Authorized by: Cole Spivey CRNA     Intubation:     Induction:  Intravenous    Intubated:  Postinduction    Mask Ventilation:  Easy mask    Attempts:  1    Attempted By:  CRNA    Method of Intubation:  Video laryngoscopy    Blade:  Gray 3    Laryngeal View Grade: Grade I - full view of cords      Difficult Airway Encountered?: No      Complications:  None    Airway Device:  Oral endotracheal tube    Airway Device Size:  7.5    Style/Cuff Inflation:  Cuffed    Inflation Amount (mL):  6    Tube secured:  22    Placement Verified By:  Capnometry    Complicating Factors:  None    Findings Post-Intubation:  BS equal bilateral

## 2023-01-17 NOTE — ANESTHESIA PREPROCEDURE EVALUATION
01/17/2023  Phong Fofana is a 32 y.o., male.      Pre-op Assessment    I have reviewed the Patient Summary Reports.     I have reviewed the Nursing Notes. I have reviewed the NPO Status.   I have reviewed the Medications.     Review of Systems  Anesthesia Hx:  No problems with previous Anesthesia  Neg history of prior surgery. Denies Family Hx of Anesthesia complications.   Denies Personal Hx of Anesthesia complications.   Social:  Former Smoker, No Alcohol Use    Hematology/Oncology:  Hematology Normal   Oncology Normal     EENT/Dental:EENT/Dental Normal   Cardiovascular:  Cardiovascular Normal     Pulmonary:  Pulmonary Normal    Renal/:  Renal/ Normal     Hepatic/GI:  Hepatic/GI Normal    Musculoskeletal:  Spine Disorders: cervical Degenerative disease, Chronic Pain and Disc disease    Neurological:   Headaches (Migranes) Seizures    Endocrine:  Endocrine Normal    Dermatological:  Skin Normal    Psych:   Psychiatric history: Bipolar. anxiety depression          Physical Exam  General: Well nourished and Alert    Airway:  Mallampati: II   Mouth Opening: Normal  TM Distance: Normal  Tongue: Normal  Neck ROM: Normal ROM    Dental:  Intact    Chest/Lungs:  Clear to auscultation, Normal Respiratory Rate    Heart:  Rate: Normal  Rhythm: Regular Rhythm  Sounds: Normal        Anesthesia Plan  Type of Anesthesia, risks & benefits discussed:    Anesthesia Type: Gen ETT  Intra-op Monitoring Plan: Standard ASA Monitors and Art Line  Post Op Pain Control Plan: multimodal analgesia  Induction:  IV  Airway Plan: Video, Post-Induction  Informed Consent: Informed consent signed with the Patient representative and all parties understand the risks and agree with anesthesia plan.  All questions answered. Patient consented to blood products? Yes  ASA Score: 3  Anesthesia Plan Notes: Tylenol 1 gm    Ready For Surgery  From Anesthesia Perspective.     .

## 2023-01-18 LAB
ANION GAP SERPL CALC-SCNC: 8 MMOL/L (ref 8–16)
BASOPHILS # BLD AUTO: 0.02 K/UL (ref 0–0.2)
BASOPHILS NFR BLD: 0.2 % (ref 0–1.9)
BUN SERPL-MCNC: 12 MG/DL (ref 6–20)
CALCIUM SERPL-MCNC: 8.5 MG/DL (ref 8.7–10.5)
CHLORIDE SERPL-SCNC: 106 MMOL/L (ref 95–110)
CO2 SERPL-SCNC: 24 MMOL/L (ref 23–29)
CREAT SERPL-MCNC: 0.8 MG/DL (ref 0.5–1.4)
DIFFERENTIAL METHOD: ABNORMAL
EOSINOPHIL # BLD AUTO: 0 K/UL (ref 0–0.5)
EOSINOPHIL NFR BLD: 0.1 % (ref 0–8)
ERYTHROCYTE [DISTWIDTH] IN BLOOD BY AUTOMATED COUNT: 12.7 % (ref 11.5–14.5)
EST. GFR  (NO RACE VARIABLE): >60 ML/MIN/1.73 M^2
GLUCOSE SERPL-MCNC: 115 MG/DL (ref 70–110)
HCT VFR BLD AUTO: 41 % (ref 40–54)
HGB BLD-MCNC: 13.9 G/DL (ref 14–18)
IMM GRANULOCYTES # BLD AUTO: 0.05 K/UL (ref 0–0.04)
IMM GRANULOCYTES NFR BLD AUTO: 0.4 % (ref 0–0.5)
LYMPHOCYTES # BLD AUTO: 1.5 K/UL (ref 1–4.8)
LYMPHOCYTES NFR BLD: 12.2 % (ref 18–48)
MCH RBC QN AUTO: 30.4 PG (ref 27–31)
MCHC RBC AUTO-ENTMCNC: 33.9 G/DL (ref 32–36)
MCV RBC AUTO: 90 FL (ref 82–98)
MONOCYTES # BLD AUTO: 0.7 K/UL (ref 0.3–1)
MONOCYTES NFR BLD: 6.1 % (ref 4–15)
NEUTROPHILS # BLD AUTO: 9.8 K/UL (ref 1.8–7.7)
NEUTROPHILS NFR BLD: 81 % (ref 38–73)
NRBC BLD-RTO: 0 /100 WBC
PLATELET # BLD AUTO: 193 K/UL (ref 150–450)
PMV BLD AUTO: 9.8 FL (ref 9.2–12.9)
POTASSIUM SERPL-SCNC: 4.1 MMOL/L (ref 3.5–5.1)
RBC # BLD AUTO: 4.57 M/UL (ref 4.6–6.2)
SODIUM SERPL-SCNC: 138 MMOL/L (ref 136–145)
WBC # BLD AUTO: 12.06 K/UL (ref 3.9–12.7)

## 2023-01-18 PROCEDURE — 85025 COMPLETE CBC W/AUTO DIFF WBC: CPT | Performed by: NEUROLOGICAL SURGERY

## 2023-01-18 PROCEDURE — 97116 GAIT TRAINING THERAPY: CPT

## 2023-01-18 PROCEDURE — 97165 OT EVAL LOW COMPLEX 30 MIN: CPT

## 2023-01-18 PROCEDURE — 80048 BASIC METABOLIC PNL TOTAL CA: CPT | Performed by: NEUROLOGICAL SURGERY

## 2023-01-18 PROCEDURE — 63600175 PHARM REV CODE 636 W HCPCS: Performed by: NEUROLOGICAL SURGERY

## 2023-01-18 PROCEDURE — 25000003 PHARM REV CODE 250: Performed by: NEUROLOGICAL SURGERY

## 2023-01-18 PROCEDURE — 36415 COLL VENOUS BLD VENIPUNCTURE: CPT | Performed by: NEUROLOGICAL SURGERY

## 2023-01-18 PROCEDURE — 97161 PT EVAL LOW COMPLEX 20 MIN: CPT

## 2023-01-18 RX ORDER — CEFADROXIL 500 MG/1
500 CAPSULE ORAL EVERY 12 HOURS
Qty: 20 CAPSULE | Refills: 0 | Status: SHIPPED | OUTPATIENT
Start: 2023-01-18

## 2023-01-18 RX ORDER — OXYCODONE AND ACETAMINOPHEN 7.5; 325 MG/1; MG/1
1 TABLET ORAL EVERY 6 HOURS PRN
Qty: 28 TABLET | Refills: 0 | Status: SHIPPED | OUTPATIENT
Start: 2023-01-18

## 2023-01-18 RX ORDER — METHOCARBAMOL 750 MG/1
750 TABLET, FILM COATED ORAL 4 TIMES DAILY
Qty: 40 TABLET | Refills: 2 | Status: SHIPPED | OUTPATIENT
Start: 2023-01-18 | End: 2023-01-28

## 2023-01-18 RX ADMIN — MUPIROCIN 1 G: 20 OINTMENT TOPICAL at 09:01

## 2023-01-18 RX ADMIN — OXYCODONE HYDROCHLORIDE AND ACETAMINOPHEN 1 TABLET: 7.5; 325 TABLET ORAL at 05:01

## 2023-01-18 RX ADMIN — GABAPENTIN 100 MG: 100 CAPSULE ORAL at 02:01

## 2023-01-18 RX ADMIN — BACLOFEN 10 MG: 10 TABLET ORAL at 02:01

## 2023-01-18 RX ADMIN — CEFAZOLIN SODIUM 2 G: 2 SOLUTION INTRAVENOUS at 05:01

## 2023-01-18 RX ADMIN — GABAPENTIN 100 MG: 100 CAPSULE ORAL at 09:01

## 2023-01-18 RX ADMIN — HYDROMORPHONE HYDROCHLORIDE 2 MG: 1 INJECTION, SOLUTION INTRAMUSCULAR; INTRAVENOUS; SUBCUTANEOUS at 09:01

## 2023-01-18 RX ADMIN — BACLOFEN 10 MG: 10 TABLET ORAL at 09:01

## 2023-01-18 RX ADMIN — QUETIAPINE 100 MG: 100 TABLET ORAL at 09:01

## 2023-01-18 RX ADMIN — HYDROMORPHONE HYDROCHLORIDE 2 MG: 1 INJECTION, SOLUTION INTRAMUSCULAR; INTRAVENOUS; SUBCUTANEOUS at 02:01

## 2023-01-18 RX ADMIN — OXYCODONE HYDROCHLORIDE AND ACETAMINOPHEN 1 TABLET: 7.5; 325 TABLET ORAL at 11:01

## 2023-01-18 RX ADMIN — METHOCARBAMOL 1000 MG: 100 INJECTION INTRAMUSCULAR; INTRAVENOUS at 02:01

## 2023-01-18 RX ADMIN — OXYCODONE HYDROCHLORIDE AND ACETAMINOPHEN 1 TABLET: 7.5; 325 TABLET ORAL at 12:01

## 2023-01-18 RX ADMIN — LEVETIRACETAM 500 MG: 500 TABLET, FILM COATED ORAL at 09:01

## 2023-01-18 RX ADMIN — METHOCARBAMOL 1000 MG: 100 INJECTION INTRAMUSCULAR; INTRAVENOUS at 05:01

## 2023-01-18 NOTE — PLAN OF CARE
[facility]  Initial Discharge Assessment       Primary Care Provider: Primary Doctor No    Admission Diagnosis: Cervical stenosis of spine [M48.02]  Pain [R52]    Admission Date: 1/17/2023  Expected Discharge Date:     Discharge Barriers Identified: None    Payor: MEDICAID / Plan: [place] / Product Type: Managed Medicaid /     No emergency contact information on file.    Discharge Plan A: Home, Home with family  Discharge Plan B: Home, Home with family      [id number] - [place] - [address] AT [facility] [address]  [address] [address]  Phone: [phone] Fax: [phone]      Initial Assessment (most recent)       Adult Discharge Assessment - 01/18/23 1535          Discharge Assessment    Assessment Type Discharge Planning Assessment     Confirmed/corrected address, phone number and insurance Yes     Confirmed Demographics Correct on Facesheet     Source of Information health record     Does patient/caregiver understand observation status Yes     Communicated ANIYAH with patient/caregiver Yes     Reason For Admission surgery     People in Home parent(s)     Do you expect to return to your current living situation? Yes     Do you have help at home or someone to help you manage your care at home? Yes     Who are your caregiver(s) and their phone number(s)? mom     Prior to hospitilization cognitive status: Alert/Oriented     Current cognitive status: Alert/Oriented     Equipment Currently Used at Home none     Readmission within 30 days? No     Patient currently being followed by outpatient case management? No     Do you currently have service(s) that help you manage your care at home? No     Do you take prescription medications? Yes     Do you have prescription coverage? Yes     Coverage medicaid     Do you have any problems affording any of your prescribed medications? No     Is the patient taking medications as  prescribed? yes     Who is going to help you get home at discharge? mom     How do you get to doctors appointments? family or friend will provide     Are you on dialysis? No     Do you take coumadin? No     Discharge Plan A Home;Home with family     Discharge Plan B Home;Home with family     DME Needed Upon Discharge  none     Discharge Barriers Identified None

## 2023-01-18 NOTE — PROGRESS NOTES
No issues overnight. Reports incision pain well controlled. Reports improved LUE pain.    Xrays stable C3-5 hardware. Alignment improved    Labs reviewed    Avss  No distress, Awake alert appropriate  MONTOYA with baseline hand contractures  Sensation intact   Dressing C/d/I   Collar/drain in place    A/P: s/p PCF C3-5. Doing well    D/c alvarez  PT/OT   Case mgt for HH  Cont drain  Possible d/c today

## 2023-01-18 NOTE — ANESTHESIA POSTPROCEDURE EVALUATION
Anesthesia Post Evaluation    Patient: Phong SALAMANCA Fofana    Procedure(s) Performed: Procedure(s) (LRB):  FUSION, SPINE, CERVICAL, POSTERIOR APPROACH (N/A)  LAMINECTOMY, SPINE, CERVICAL, WITH FUSION (N/A)    Final Anesthesia Type: general      Patient location during evaluation: PACU  Note status: Pt is a non verbal patient but does follow commands and responds   Level of consciousness: awake and alert  Post-procedure vital signs: reviewed and stable  Pain management: adequate  Airway patency: patent    PONV status at discharge: No PONV  Anesthetic complications: no      Cardiovascular status: blood pressure returned to baseline, hemodynamically stable and stable  Respiratory status: unassisted, spontaneous ventilation and room air  Hydration status: euvolemic  Follow-up not needed.          Vitals Value Taken Time   /89 01/17/23 1945   Temp 36.2 °C (97.2 °F) 01/17/23 1841   Pulse 76 01/17/23 1956   Resp 14 01/17/23 1956   SpO2 100 % 01/17/23 1956   Vitals shown include unvalidated device data.      Event Time   Out of Recovery 19:59:00         Pain/Omega Score: Pain Rating Prior to Med Admin: 6 (1/17/2023  7:33 PM)  Omega Score: 10 (1/17/2023  7:30 PM)

## 2023-01-18 NOTE — TRANSFER OF CARE
Anesthesia Transfer of Care Note    Patient: Phong SALAMANCA Fofana    Procedure(s) Performed: Procedure(s) (LRB):  FUSION, SPINE, CERVICAL, POSTERIOR APPROACH (N/A)  LAMINECTOMY, SPINE, CERVICAL, WITH FUSION (N/A)    Patient location: PACU    Anesthesia Type: general    Transport from OR: Transported from OR on room air with adequate spontaneous ventilation    Post pain: adequate analgesia    Post assessment: no apparent anesthetic complications    Post vital signs: stable    Level of consciousness: awake    Nausea/Vomiting: no nausea/vomiting    Complications: none    Transfer of care protocol was followedComments: Patient stable, report to RN, questions answered.  I am available during the recovery time for any further needs       Last vitals:   Visit Vitals  /80 (BP Location: Left arm, Patient Position: Lying)   Pulse 63   Temp 36.9 °C (98.5 °F) (Oral)   Resp 16   SpO2 100%

## 2023-01-18 NOTE — CARE UPDATE
SW notified by RN that MD is recommending home health for patient and patient mother does not have a preference. SW reviewed chart and noticed patient has medicaid insurance. SW notified RN that patient has medicaid and medicaid does not cover home health. SW suggested outpatient PT/OT. SW following

## 2023-01-18 NOTE — PT/OT/SLP EVAL
Occupational Therapy   Evaluation and Discharge Note    Name: Phong Fofana  MRN: [id number]  Admitting Diagnosis: <principal problem not specified>  Recent Surgery: Procedure(s) (LRB):  FUSION, SPINE, CERVICAL, POSTERIOR APPROACH (N/A)  LAMINECTOMY, SPINE, CERVICAL, WITH FUSION (N/A) 1 Day Post-Op    Recommendations:     Discharge Recommendations: home  Discharge Equipment Recommendations: none  Barriers to discharge:  None    Assessment:     Phong Fofana is a 32 y.o. male with a medical diagnosis of <principal problem not specified>. At this time, patient is functioning at their prior level of function and does not require further acute OT services.     Plan:     During this hospitalization, patient does not require further acute OT services.  Please re-consult if situation changes.    Plan of Care Reviewed with: patient    Subjective     Chief Complaint: none  Patient/Family Comments/goals: return home    Occupational Profile:  Living Environment: Lives with his step-father  Previous level of function: Needed assist with dressing at times (buttons, zippers, etc.); otherwise Mod (I) with ADLs for increased time  Equipment Used at home: none  Assistance upon Discharge: step-father    Pain/Comfort:  Pain Rating 1: 0/10  Pain Rating Post-Intervention 1: 0/10    Objective:     Communicated with: nurse prior to session.  Patient found HOB elevated with cervical collar, DINESH drain, telemetry upon OT entry to room.    General Precautions: Standard, fall  Orthopedic Precautions: spinal precautions  Braces: Cervical collar  Respiratory Status: Room air     Occupational Performance:    Functional Mobility/Transfers:  Pt declined; reports having completed with PT with no concerns     Activities of Daily Living:  Feeding: set-up assistance HOB elevated    Cognitive/Visual Perceptual:  Cognitive/Psychosocial Skills:     -       Oriented to: Person, Place, Time, and Situation   -       Follows Commands/attention:Follows  multistep  commands  Non-verbal; used gestures or handwriting to communicate today    Physical Exam:  RUE: WFL AROM and strength except thumb contracted in flexion; hand is otherwise functional; pt was able to write with good legibility using his right hand; pt reports intact sensation  LUE; WFL AROM and strength except at the hand; thumb/digits are contracted in flexion; impaired fine motor; pt reports mild decrease in light touch sensation in left thumb; skin at L palm intact     AMPAC 6 Click ADL:  AMPAC Total Score: 23    Treatment & Education:  Pt educated on OT role/discharge    Patient left HOB elevated with all lines intact, call button in reach, and bed alarm on    GOALS:   Multidisciplinary Problems       Occupational Therapy Goals       Not on file                    History:     Past Medical History:   Diagnosis Date    Abnormal gait     rt foot turns out    Anxious depression     Back pain     Bipolar disorder, unspecified     Contracture of hand     rosi post seizure    Depression     Migraines     Neck pain     radiates to both arms numbness/tingling in both arms more on left arm    Nonverbal     since seizure    Photosensitivity     wears dark glasses    Schizophrenia, unspecified     Seizures     last witness seizure May 2022         Past Surgical History:   Procedure Laterality Date    CERVICAL LAMINECTOMY WITH SPINAL FUSION N/A 1/17/2023    Procedure: LAMINECTOMY, SPINE, CERVICAL, WITH FUSION;  Surgeon: Tobi Levin DO;  Location: [facility];  Service: Neurosurgery;  Laterality: N/A;    FUSION OF CERVICAL SPINE BY POSTERIOR APPROACH N/A 1/17/2023    Procedure: FUSION, SPINE, CERVICAL, POSTERIOR APPROACH;  Surgeon: Tobi Levin DO;  Location: [facility] OR;  Service: Neurosurgery;  Laterality: N/A;  IOM, C-ARM, MICRO MEDTRONICS (TW notifed rep 1/12)  (POSTERIOR)    (C3-4,  C4-5, C5-6)  LAMINECTOMY WITH FUSION    TYMPANOSTOMY TUBE PLACEMENT      WRIST SURGERY Left     bone infection       Time  Tracking:     OT Date of Treatment: 01/18/23  OT Start Time: 1150  OT Stop Time: 1204  OT Total Time (min): 14 min    Billable Minutes:Evaluation 14    1/18/2023

## 2023-01-18 NOTE — OP NOTE
Date of procedure:  January 17, 2023     Preoperative diagnosis:    1. Cervical spinal canal stenosis C3-4     2. Extradural mass C3-4     3. Cervical spondylosis     4. Seizure disorder     5. Schizophrenia    6. Bipolar disorder     7. Dysarthria     8. Spasticity     Postoperative diagnosis:    1. Same     Procedures:    1. Left C3-4 laminectomy, foraminotomy, resection of extradural mass    2. Bilateral posterior segmental instrumentation C3-4, C4-5     3. Bilateral posterior lateral arthrodesis C3-4, C4-5     4. Harvesting local autograft, implantation of allograft    5. Intraoperative neuro monitoring     Surgeon:  Tobi Levin D.O. MBA     Assistant:  See medical record     Anesthesia:  General     Estimated blood loss:  200 cc     Specimens:  Left C3-4 extradural mass     Complications:  None    Operative procedure:  After obtaining written consent, patient was brought to the operating room where adequate IV access was established.  Upon induction of general anesthesia right radial arterial line was placed.  A Cisse catheter was placed without difficulty.  Somatosensory evoked potentials were established in all extremities.  Given seizure history, motor evoked potentials were not utilized.  The patient was then carefully positioned prone on a Alberto table.  A Nino head villegas was secured prior to positioning without difficulty.  The Nino head villegas secured to the table without difficulty.  The cervical spine was maintained in neutral position.  Patient was positioned with a cervical collar in place.  Post positioning SSEP these were unchanged.  The arms were gently retracted inferiorly.  All bony prominences were padded.  Arms were tucked at the sides.  The posterior cervical region was clipped prepped and draped in sterile fashion.  A surgical time-out was performed.  Lidocaine with epinephrine was injected at the incision site.  A midline incision was made from the spinous process C3-C5.   Subperiosteal dissection was carried out exposing the posterior elements.  C3 through 5 segments were confirmed with fluoroscopy.  The C3 through 5 spinous processes were resected.  Lateral mass screws were placed bilaterally at C3, C4, C5.  Microscope was draped and brought to the operative field.  A left C3 and C4 hemilaminectomy, foraminotomy was performed.  Ligamentum flavum was carefully elevated and resected.  A left lateral canal and foraminal mass was encountered extending from the facet joint.  There was distortion of the spinal cord and exiting nerve root noted.  The mass was carefully  from the dura and resected with microsurgical technique under the microscope.  Specimens sent for pathology.  Adequate decompression of the spinal canal and exiting nerve root was confirmed under the microscope.  Meticulous hemostasis was achieved.  Titanium rods were sized contoured and secured to the lateral mass screws.  Posterolateral arthrodesis was performed at C3-4 and C4-5 bilaterally with decortication with packing of locally harvested autograft combined with cell based allograft.  The wound was copiously irrigated.  hemostasis was achieved.  A subfascial drain was placed and tunneled remote incision site secured.  The muscle and fascia was reapproximated with absorbable suture in interrupted fashion.  The subcu tissue was reapproximated interrupted fashion with absorbable suture.  Surgical staples were placed.  A sterile surgical dressing was placed.  The patient was positioned supine on a stretcher.  The Nino head villegas was removed without difficulty.  Pin sites inspected.  He was extubated in the operating room transferred to the recovery room in stable condition.  There were no intraoperative complications.  Sponge and needle counts were correct.

## 2023-01-18 NOTE — PT/OT/SLP EVAL
Physical Therapy Evaluation and Discharge Note    Patient Name:  Phong Fofana   MRN:  [id number]    Recommendations:     Discharge Recommendations: home  Discharge Equipment Recommendations: none   Barriers to discharge: None    Assessment:     Phong Fofana is a 32 y.o. male admitted with a medical diagnosis of <principal problem not specified>. .  At this time, patient is functioning at their prior level of function and does not require further acute PT services.     Recent Surgery: Procedure(s) (LRB):  FUSION, SPINE, CERVICAL, POSTERIOR APPROACH (N/A)  LAMINECTOMY, SPINE, CERVICAL, WITH FUSION (N/A) 1 Day Post-Op    Plan:     During this hospitalization, patient does not require further acute PT services.  Please re-consult if situation changes.      Subjective     Chief Complaint: neck pain 4/10  Patient/Family Comments/goals: Home with family to assist  Pain/Comfort:  Pain Rating 1: 4/10  Location 1: neck  Pain Addressed 1: Reposition, Distraction, Pre-medicate for activity, Nurse notified  Pain Rating Post-Intervention 1: 4/10    Patients cultural, spiritual, Restorationist conflicts given the current situation:      Living Environment:  Pt lives with his mom & step-dad in a single story house with no steps to enter.   Prior to admission, patients level of function was Supervision to independent w/ mobility. Pt is non-verbal, communicating with PT using head nods, hand gestures & writing(per record, since onset of Seizures in 4/22).   Equipment used at home: none.  DME owned (not currently used): none.  Upon discharge, patient will have assistance from his mom and step-dad.    Objective:     Communicated with ODILON Oswald prior to session.  Patient found HOB elevated with cervical collar, DINESH drain, telemetry, peripheral IV upon PT entry to room.    General Precautions: Standard, fall, seizure    Orthopedic Precautions:spinal precautions   Braces: Cervical collar  Respiratory Status: Room  air    Exams:  Cognitive Exam:  Patient is oriented to Person, Place, Time, and Situation  RLE ROM: WFL  RLE Strength: WFL  LLE ROM: WFL  LLE Strength: WFL    Functional Mobility:  Bed Mobility:     Scooting: modified independence and supervision  Supine to Sit: modified independence and supervision  Sit to Supine: modified independence and supervision  Transfers:     Sit to Stand:  modified independence and supervision with no AD  Gait: x 300' no AD w/ CGA on gt belt and progressing to SBA for safety. Pt with no complaints or problems during gt and acknowledged no changes in gait pattern, etc since surgery.    AM-PAC 6 CLICK MOBILITY  Total Score:24       Treatment and Education:  Pt was educated on the following: call light use, importance of OOB activity and functional mobility to negate the negative effects of prolonged bed rest during this hospitalization, safe transfers/ambulation and discharge planning recommendations/options.      AM-PAC 6 CLICK MOBILITY  Total Score:24     Patient left HOB elevated with all lines intact, call button in reach, bed alarm on, and RN notified.    GOALS:   Multidisciplinary Problems       Physical Therapy Goals       Not on file                    History:     Past Medical History:   Diagnosis Date    Abnormal gait     rt foot turns out    Anxious depression     Back pain     Bipolar disorder, unspecified     Contracture of hand     rosi post seizure    Depression     Migraines     Neck pain     radiates to both arms numbness/tingling in both arms more on left arm    Nonverbal     since seizure    Photosensitivity     wears dark glasses    Schizophrenia, unspecified     Seizures     last witness seizure May 2022       Past Surgical History:   Procedure Laterality Date    CERVICAL LAMINECTOMY WITH SPINAL FUSION N/A 1/17/2023    Procedure: LAMINECTOMY, SPINE, CERVICAL, WITH FUSION;  Surgeon: Tobi Levin DO;  Location: [facility];  Service: Neurosurgery;  Laterality: N/A;     FUSION OF CERVICAL SPINE BY POSTERIOR APPROACH N/A 1/17/2023    Procedure: FUSION, SPINE, CERVICAL, POSTERIOR APPROACH;  Surgeon: Tobi Levin DO;  Location: [facility];  Service: Neurosurgery;  Laterality: N/A;  IOM, C-ARM, MICRO MEDTRONICS (TW notifed rep 1/12)  (POSTERIOR)    (C3-4,  C4-5, C5-6)  LAMINECTOMY WITH FUSION    TYMPANOSTOMY TUBE PLACEMENT      WRIST SURGERY Left     bone infection       Time Tracking:     PT Received On: 01/18/23  PT Start Time: 1033     PT Stop Time: 1052  PT Total Time (min): 19 min     Billable Minutes: Evaluation 10 and Gait Training 9      01/18/2023

## 2023-01-19 VITALS
BODY MASS INDEX: 27.33 KG/M2 | DIASTOLIC BLOOD PRESSURE: 79 MMHG | OXYGEN SATURATION: 99 % | SYSTOLIC BLOOD PRESSURE: 127 MMHG | HEART RATE: 100 BPM | HEIGHT: 68 IN | RESPIRATION RATE: 17 BRPM | WEIGHT: 180.31 LBS | TEMPERATURE: 99 F

## 2023-01-19 PROCEDURE — 63600175 PHARM REV CODE 636 W HCPCS: Performed by: NEUROLOGICAL SURGERY

## 2023-01-19 PROCEDURE — 25000003 PHARM REV CODE 250: Performed by: NEUROLOGICAL SURGERY

## 2023-01-19 RX ADMIN — GABAPENTIN 100 MG: 100 CAPSULE ORAL at 09:01

## 2023-01-19 RX ADMIN — METHOCARBAMOL 1000 MG: 100 INJECTION INTRAMUSCULAR; INTRAVENOUS at 05:01

## 2023-01-19 RX ADMIN — MUPIROCIN 1 G: 20 OINTMENT TOPICAL at 09:01

## 2023-01-19 RX ADMIN — LEVETIRACETAM 500 MG: 500 TABLET, FILM COATED ORAL at 09:01

## 2023-01-19 RX ADMIN — BACLOFEN 10 MG: 10 TABLET ORAL at 09:01

## 2023-01-19 RX ADMIN — OXYCODONE HYDROCHLORIDE 10 MG: 5 TABLET ORAL at 05:01

## 2023-01-19 RX ADMIN — OXYCODONE HYDROCHLORIDE AND ACETAMINOPHEN 1 TABLET: 7.5; 325 TABLET ORAL at 09:01

## 2023-01-19 NOTE — DISCHARGE SUMMARY
[facility]  Discharge Note  Short Stay    Procedure(s) (LRB):  FUSION, SPINE, CERVICAL, POSTERIOR APPROACH (N/A)  LAMINECTOMY, SPINE, CERVICAL, WITH FUSION (N/A)      OUTCOME: Patient tolerated treatment/procedure well without complication and is now ready for discharge.    DISPOSITION: Home or Self Care    FINAL DIAGNOSIS:  <principal problem not specified>    FOLLOWUP: In clinic    DISCHARGE INSTRUCTIONS:  No discharge procedures on file.      Clinical Reference Documents Added to Patient Instructions         Document    CEFADROXIL, ADULT (ENGLISH)    METHOCARBAMOL, ADULT (ENGLISH)    OXYCODONE AND ACETAMINOPHEN, ADULT (ENGLISH)            TIME SPENT ON DISCHARGE: 20 minutes

## 2023-01-19 NOTE — PLAN OF CARE
01/19/23 1223   Final Note   Assessment Type Final Discharge Note   Anticipated Discharge Disposition Home   What phone number can be called within the next 1-3 days to see how you are doing after discharge? [phone]   Post-Acute Status   Coverage medicaid   Discharge Delays None known at this time     MD recommended home health, however patient insurance does not cover home health. SW suggested outpatient PT/OT. Physical therapy recommended home with no needed. MD stated that is fine. Patient to d/c home no needs.    Discharge orders and chart reviewed with no further post-acute discharge needs identified at this time.  At this time, patient is cleared for discharge from Case Management standpoint.

## 2023-01-20 ENCOUNTER — TELEPHONE (OUTPATIENT)
Dept: NEUROSURGERY | Facility: CLINIC | Age: 33
End: 2023-01-20
Payer: MEDICAID

## 2023-01-20 ENCOUNTER — HOSPITAL ENCOUNTER (EMERGENCY)
Facility: HOSPITAL | Age: 33
Discharge: HOME | End: 2023-01-20
Attending: EMERGENCY MEDICINE
Payer: MEDICAID

## 2023-01-20 VITALS
BODY MASS INDEX: 28.04 KG/M2 | RESPIRATION RATE: 16 BRPM | WEIGHT: 185 LBS | DIASTOLIC BLOOD PRESSURE: 111 MMHG | SYSTOLIC BLOOD PRESSURE: 147 MMHG | HEART RATE: 84 BPM | TEMPERATURE: 98 F | OXYGEN SATURATION: 96 % | HEIGHT: 68 IN

## 2023-01-20 DIAGNOSIS — Z48.02 ENCOUNTER FOR ATTENTION TO SURGICAL DRESSINGS AND SUTURES: Primary | ICD-10-CM

## 2023-01-20 DIAGNOSIS — Z48.01 ENCOUNTER FOR ATTENTION TO SURGICAL DRESSINGS AND SUTURES: Primary | ICD-10-CM

## 2023-01-20 PROCEDURE — 99282 EMERGENCY DEPT VISIT SF MDM: CPT

## 2023-01-20 NOTE — DISCHARGE INSTRUCTIONS
Please follow-up as directed   Return for any concerns   Dr. Levin reports that one  of his nurses will contact you

## 2023-01-20 NOTE — TELEPHONE ENCOUNTER
Contacted pt's stepfather to discuss postop status. He reports that drain had a blood clot in drain and once it was removed at ER today, pt's pt level decreased from a 10/10 to 4/10. Reminded him to make sure pt take Duricef prescribed when discharged. Scheduled pt's appt for Monday to have drain removed. He voiced understanding to appt details.

## 2023-01-20 NOTE — ED PROVIDER NOTES
[address] [place] [address] and Primary Care  [place] [address]  [address] [place] [address]  Phone:  [phone]  Fax: [phone]       Chief Complaint   Patient presents with    Hypertension     6 month follow up    . SUBJECTIVE:    Shira Garcia is a 75 y.o. female Comes in for return visit stating that she has done well. She is maintaining her weight nicely. She has had no major problems. She continues to work part time. She does have a history of hypertension and occasionally reactive airway disease. Current Outpatient Medications   Medication Sig Dispense Refill    amLODIPine (NORVASC) 5 mg tablet Take 1 Tab by mouth daily. 30 Tab 11    albuterol (PROVENTIL HFA, VENTOLIN HFA, PROAIR HFA) 90 mcg/actuation inhaler Take 2 Puffs by inhalation every four (4) hours as needed for Wheezing. 1 Inhaler 1     Past Medical History:   Diagnosis Date    Pneumonia 04/01/2010     Past Surgical History:   Procedure Laterality Date    COLONOSCOPY N/A 5/23/2016    COLONOSCOPY performed by Luz Jean Baptiste MD at [address] HX BREAST BIOPSY Right     Benign. Patient thinks biopsy was on rt breast lower areolar area. Along time ago.     HX GI      colonscopy - about 2011    HX GYN      hysterectomy    HX HYSTERECTOMY      HX ORTHOPAEDIC      surgery for broken foot - right     No Known Allergies      REVIEW OF SYSTEMS:  General: negative for - chills or fever  ENT: negative for - headaches, nasal congestion or tinnitus  Respiratory: negative for - cough, hemoptysis, shortness of breath or wheezing  Cardiovascular : negative for - chest pain, edema, palpitations or shortness of breath  Gastrointestinal: negative for - abdominal pain, blood in stools, heartburn or nausea/vomiting  Genito-Urinary: no dysuria, trouble voiding, or hematuria  Musculoskeletal: negative for - gait disturbance, joint pain, joint stiffness or joint swelling  Neurological: no TIA or stroke symptoms  Hematologic: no Encounter Date: 1/20/2023       History     Chief Complaint   Patient presents with    Drain clogged     Tuesday had spinal surgery here, pt noticed last night his drain was clogged. Reports more pain in his middle back and L side. Dad in triage with pt. Pt unable to speak but can answer yes/no or text to answer questions. Has c-collar on.      32-year-old male presents emergency department with father for emergent evaluation of having a clogged surgical drain.  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 and was unable to get in touch with Dr. Levin so he came to the emergency department.    Review of patient's allergies indicates:  No Known Allergies  Past Medical History:   Diagnosis Date    Abnormal gait     rt foot turns out    Anxious depression     Back pain     Bipolar disorder, unspecified     Contracture of hand     rosi post seizure    Depression     Migraines     Neck pain     radiates to both arms numbness/tingling in both arms more on left arm    Nonverbal     since seizure    Photosensitivity     wears dark glasses    Schizophrenia, unspecified     Seizures     last witness seizure May 2022     Past Surgical History:   Procedure Laterality Date    CERVICAL LAMINECTOMY WITH SPINAL FUSION N/A 1/17/2023    Procedure: LAMINECTOMY, SPINE, CERVICAL, WITH FUSION;  Surgeon: Tobi Levin DO;  Location: [facility] OR;  Service: Neurosurgery;  Laterality: N/A;    FUSION OF CERVICAL SPINE BY POSTERIOR APPROACH N/A 1/17/2023    Procedure: FUSION, SPINE, CERVICAL, POSTERIOR APPROACH;  Surgeon: Tobi Levin DO;  Location: [facility] OR;  Service: Neurosurgery;  Laterality:  N/A;  IOM, C-ARM, MICRO MEDTRONICS (TW notifed rep )  (POSTERIOR)    (C3-4,  C4-5, C5-6)  LAMINECTOMY WITH FUSION    TYMPANOSTOMY TUBE PLACEMENT      WRIST SURGERY Left     bone infection     No family history on file.  Social History     Tobacco Use    Smoking status: Former     Packs/day: 2.00     Types: Cigarettes     Start date:      Quit date: 2023     Years since quittin.0    Smokeless tobacco: Never   Substance Use Topics    Alcohol use: Not Currently    Drug use: Never     Review of Systems   Constitutional:  Negative for fever.   HENT: Negative.     Respiratory: Negative.     Cardiovascular: Negative.    Genitourinary: Negative.    Neurological: Negative.    All other systems reviewed and are negative.    Physical Exam     Initial Vitals [23 1352]   BP Pulse Resp Temp SpO2   (!) 147/111 84 16 98.4 °F (36.9 °C) 96 %      MAP       --         Physical Exam    Nursing note and vitals reviewed.  Constitutional: He appears well-developed and well-nourished.   Neck:   C-collar is in place there is a drain noted to the posterior left lateral aspect of the neck that is intact there is no drainage around the drain the drain tubing was milk several times with resolution of a small clot and the drain is now draining   Cardiovascular:  Normal rate, regular rhythm and normal heart sounds.             Neurological: He is alert.   Patient is nonverbal which is baseline for him he also has photophobia which again is baseline he has contractures to both of his hands left greater than right his father reports since the surgery he is able to open up his right hand completely with exception of his thumb which is significantly improved since his surgical intervention he is able to ambulate with an irregular gait which she in his father report is baseline for him basically the patient's neuro exam has improved since his surgical procedure any is currently baseline   Skin: Skin is warm and dry. No rash  bruises, no bleeding, no swollen glands  Integument: no lumps, mole changes, nail changes or rash  Endocrine: no malaise/lethargy or unexpected weight changes      Social History     Socioeconomic History    Marital status: LEGALLY      Spouse name: Not on file    Number of children: 3    Years of education: Not on file    Highest education level: Not on file   Occupational History    Occupation: retired cna    Occupation: Current  [email or web address] Use    Smoking status: Never Smoker    Smokeless tobacco: Never Used   Substance and Sexual Activity    Alcohol use: No    Drug use: No    Sexual activity: Not Currently     Family History   Problem Relation Age of Onset    Cancer Sister 39        colon ca       OBJECTIVE:    Visit Vitals  /81   Pulse 74   Temp 98.1 °F (36.7 °C) (Oral)   Resp 16   Ht 5' 3\" (1.6 m)   Wt 115 lb 6.4 oz (52.3 kg)   SpO2 92%   BMI 20.44 kg/m²     CONSTITUTIONAL: well , well nourished, appears age appropriate  EYES: perrla, eom intact  ENMT:moist mucous membranes, pharynx clear  NECK: supple. Thyroid normal  RESPIRATORY: Chest: clear to ascultation and percussion   CARDIOVASCULAR: Heart: regular rate and rhythm  GASTROINTESTINAL: Abdomen: soft, bowel sounds active  HEMATOLOGIC: no pathological lymph nodes palpated  MUSCULOSKELETAL: Extremities: no edema, pulse 1+   INTEGUMENT: No unusual rashes or suspicious skin lesions noted. Nails appear normal.  NEUROLOGIC: non-focal exam   MENTAL STATUS: alert and oriented, appropriate affect      ASSESSMENT:  1. Screening mammogram, encounter for    2. Nodular goiter    3. Lymphadenopathy of head and neck    4. Essential hypertension    5. Anemia, unspecified type    6. Colon cancer screening    7. Dyslipidemia        PLAN:    1. She does have a nodular goiter. Her last TSH was normal.  Ideally she needs to have an ultrasound and thyroid scan.     2. She has shoddy nodes in both anterior cervical chains. There is nothing overwhelming. I will see her back in the office in one month for follow up of this. 3. Her BP is slightly elevated, systolic is in the 606 range. She is maintained on Amlodipine 5 mg. If this pressure is up on her return visit in a month, I will increase her medication. 4. She is mildly anemic with normocytic, normochromic indices. Further characterization of this will occur. .  Orders Placed This Encounter    NATALIE MAMMOGRAM SCREENING DIGITAL BILAT    US THYROID/PARATHYROID/SOFT TISS    COLOGUARD TEST (FECAL DNA COLORECTAL CANCER SCREENING)    CBC WITH AUTOMATED DIFF    PROTEIN ELECTROPHORESIS    IRON PROFILE    [id number] DEHYDROGENASE    APOLIPOPROTEIN B         Follow-up and Dispositions    · Return in about 4 weeks (around 9/26/2019).            Timmy Chin MD noted.   Psychiatric: He has a normal mood and affect.       ED Course   Procedures  Labs Reviewed - No data to display       Imaging Results    None          Medications - No data to display  Medical Decision Making:   History:   I obtained history from: someone other than patient.  Old Records Summarized: records from previous admission(s).  Initial Assessment:   32-year-old male presents emergency department with father for emergent evaluation of having a clogged surgical drain.  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 and was unable to get in touch with Dr. Levin so he came to the emergency department.    Differential Diagnosis:   Considerations include clogged surgical drain, infections, other  ED Management:  Patient presents emergency department concerned that his surgical drain was clogged because is not drained since yesterday he has no complaints he has no significant pain is neurological exam is completely baseline for him actually has improvement of contractures to his right upper extremity greater than left.  Patient has had no fever he has no signs of wound infection was able to milk was drained with resolution of being clogged.  I did inform Dr. Levin via secure chat that his patient was here with a clogged surgical drain that it seemed to be resolved and that I was discharging this patient both the patient and his father amendable to discharge Dr. Levin reports that he will have his nurse get in contact with the patient for follow-up.                        Clinical Impression:   Final diagnoses:  [Z48.01, Z48.02] Encounter for attention to surgical  dressings and sutures (Primary)        ED Disposition Condition    Discharge Stable          ED Prescriptions    None       Follow-up Information    None          Stacey Fabian, YENI  01/20/23 1869

## 2023-01-23 ENCOUNTER — CLINICAL SUPPORT (OUTPATIENT)
Dept: NEUROSURGERY | Facility: CLINIC | Age: 33
End: 2023-01-23
Payer: MEDICAID

## 2023-01-23 DIAGNOSIS — Z98.890 POSTOPERATIVE STATE: Primary | ICD-10-CM

## 2023-01-23 PROCEDURE — 99024 PR POST-OP FOLLOW-UP VISIT: ICD-10-PCS | Mod: S$GLB,,, | Performed by: NEUROLOGICAL SURGERY

## 2023-01-23 PROCEDURE — 99024 POSTOP FOLLOW-UP VISIT: CPT | Mod: S$GLB,,, | Performed by: NEUROLOGICAL SURGERY

## 2023-01-23 NOTE — PROGRESS NOTES
Pt presented to clinic for drain removal with drain set to suction and 10ml serosanguinous fluid present. Suture removed and drain pulled with no resistance met. No drainage or bleeding noted at drain site. Clean and dry dressing applied. Pt tolerated well and ambulated from clinic without assistance.

## 2023-01-27 RX ORDER — HYDROCODONE BITARTRATE AND ACETAMINOPHEN 10; 325 MG/1; MG/1
1 TABLET ORAL EVERY 6 HOURS PRN
Qty: 28 TABLET | Refills: 0 | Status: SHIPPED | OUTPATIENT
Start: 2023-01-27 | End: 2023-02-03

## 2023-02-06 ENCOUNTER — OFFICE VISIT (OUTPATIENT)
Dept: NEUROSURGERY | Facility: CLINIC | Age: 33
End: 2023-02-06
Payer: MEDICAID

## 2023-02-06 ENCOUNTER — HOSPITAL ENCOUNTER (OUTPATIENT)
Dept: RADIOLOGY | Facility: HOSPITAL | Age: 33
Discharge: HOME | End: 2023-02-06
Attending: NEUROLOGICAL SURGERY
Payer: MEDICAID

## 2023-02-06 ENCOUNTER — TELEPHONE (OUTPATIENT)
Dept: NEUROSURGERY | Facility: CLINIC | Age: 33
End: 2023-02-06

## 2023-02-06 VITALS — HEART RATE: 61 BPM | SYSTOLIC BLOOD PRESSURE: 116 MMHG | DIASTOLIC BLOOD PRESSURE: 77 MMHG

## 2023-02-06 DIAGNOSIS — M54.2 NECK PAIN: Primary | ICD-10-CM

## 2023-02-06 DIAGNOSIS — M48.02 CERVICAL STENOSIS OF SPINE: ICD-10-CM

## 2023-02-06 PROCEDURE — 99024 PR POST-OP FOLLOW-UP VISIT: ICD-10-PCS | Mod: S$GLB,,, | Performed by: NEUROLOGICAL SURGERY

## 2023-02-06 PROCEDURE — 1159F MED LIST DOCD IN RCRD: CPT | Mod: CPTII,S$GLB,, | Performed by: NEUROLOGICAL SURGERY

## 2023-02-06 PROCEDURE — 3078F PR MOST RECENT DIASTOLIC BLOOD PRESSURE < 80 MM HG: ICD-10-PCS | Mod: CPTII,S$GLB,, | Performed by: NEUROLOGICAL SURGERY

## 2023-02-06 PROCEDURE — 99024 POSTOP FOLLOW-UP VISIT: CPT | Mod: S$GLB,,, | Performed by: NEUROLOGICAL SURGERY

## 2023-02-06 PROCEDURE — 3074F SYST BP LT 130 MM HG: CPT | Mod: CPTII,S$GLB,, | Performed by: NEUROLOGICAL SURGERY

## 2023-02-06 PROCEDURE — 72040 X-RAY EXAM NECK SPINE 2-3 VW: CPT | Mod: TC,FY

## 2023-02-06 PROCEDURE — 3078F DIAST BP <80 MM HG: CPT | Mod: CPTII,S$GLB,, | Performed by: NEUROLOGICAL SURGERY

## 2023-02-06 PROCEDURE — 1159F PR MEDICATION LIST DOCUMENTED IN MEDICAL RECORD: ICD-10-PCS | Mod: CPTII,S$GLB,, | Performed by: NEUROLOGICAL SURGERY

## 2023-02-06 PROCEDURE — 72040 XR CERVICAL SPINE AP LATERAL: ICD-10-PCS | Mod: 26,,, | Performed by: RADIOLOGY

## 2023-02-06 PROCEDURE — 3074F PR MOST RECENT SYSTOLIC BLOOD PRESSURE < 130 MM HG: ICD-10-PCS | Mod: CPTII,S$GLB,, | Performed by: NEUROLOGICAL SURGERY

## 2023-02-06 PROCEDURE — 72040 X-RAY EXAM NECK SPINE 2-3 VW: CPT | Mod: 26,,, | Performed by: RADIOLOGY

## 2023-02-06 RX ORDER — OXYCODONE AND ACETAMINOPHEN 5; 325 MG/1; MG/1
1 TABLET ORAL EVERY 8 HOURS PRN
Qty: 28 TABLET | Refills: 0 | Status: SHIPPED | OUTPATIENT
Start: 2023-02-06

## 2023-02-06 NOTE — PROGRESS NOTES
The patient is status post C3-4 with C3-6 posterior fusion performed uneventfully on January 17, 2023.  He and his father report that he has improved hand function, improved gait, and resolved preoperative left neck pain.  He reports improving incision pain.  Denies new weakness or numbness.  Denies fevers chills malaise.      Postoperative x-rays today reviewed implants in good position no hardware complication     Exam:  Pleasant  Awake, alert, oriented to person place and time.    Cranial nerves grossly intact.    motor exam is stable    Sensation is grossly intact throughout.    Gait grossly normal   Incision well healed without sign of infection.  Staples removed    Analysis: He is doing well postoperatively.  We will coordinate outpatient PT and OT.  I refilled Percocet at lower dosage and frequency per the patient's request.  Wound care and collar use instructions provided.  We will see him back in 6 weeks with repeat cervical x-rays

## 2023-02-06 NOTE — TELEPHONE ENCOUNTER
----- Message from Stacey M Lefort sent at 2/6/2023  3:21 PM CST -----  Pt's stepdad needs a callback at [phone]. Pt's medicine is ready but it needs a pre auth. Thank you.

## 2023-02-09 ENCOUNTER — TELEPHONE (OUTPATIENT)
Dept: NEUROSURGERY | Facility: CLINIC | Age: 33
End: 2023-02-09
Payer: MEDICAID

## 2023-02-09 DIAGNOSIS — Z98.890 HISTORY OF CERVICAL SPINAL SURGERY: Primary | ICD-10-CM

## 2023-02-09 DIAGNOSIS — Z98.890 H/O CERVICAL SPINE SURGERY: Primary | ICD-10-CM

## 2023-02-09 NOTE — TELEPHONE ENCOUNTER
Returned call to pt's step father and informed that Dr. Levin is not able to do med prio auth until next week but that he is can pay out of pocket if able. He voiced understanding.

## 2023-02-09 NOTE — TELEPHONE ENCOUNTER
----- Message from Stacey M Lefort sent at 2/9/2023  4:28 PM CST -----  Pt's stepdad Jerrell needs a callback - the meds need a prior auth so that they are paid for by insurance. His callback is [phone]. Thank you.

## 2023-02-13 ENCOUNTER — CLINICAL SUPPORT (OUTPATIENT)
Dept: REHABILITATION | Facility: HOSPITAL | Age: 33
End: 2023-02-13
Attending: NEUROLOGICAL SURGERY
Payer: MEDICAID

## 2023-02-13 DIAGNOSIS — Z98.1 S/P CERVICAL SPINAL FUSION: ICD-10-CM

## 2023-02-13 DIAGNOSIS — R26.89 ABNORMALITY OF GAIT DUE TO IMPAIRMENT OF BALANCE: ICD-10-CM

## 2023-02-13 DIAGNOSIS — Z98.890 H/O CERVICAL SPINE SURGERY: ICD-10-CM

## 2023-02-13 PROCEDURE — 97161 PT EVAL LOW COMPLEX 20 MIN: CPT | Mod: PN

## 2023-02-13 NOTE — PROGRESS NOTES
Physical therapy evaluation completed, please see Plan of Care for details. Thank you for this referral.

## 2023-02-15 PROBLEM — R26.89 ABNORMALITY OF GAIT DUE TO IMPAIRMENT OF BALANCE: Status: ACTIVE | Noted: 2023-02-15

## 2023-02-15 PROBLEM — Z98.1 S/P CERVICAL SPINAL FUSION: Status: ACTIVE | Noted: 2023-02-15

## 2023-02-15 NOTE — PLAN OF CARE
[facility]  Physical Therapy Neurological Rehabilitation Initial Evaluation    Name: Phong Fofana  Clinic Number: [id number]    Therapy Diagnosis:   Encounter Diagnoses   Name Primary?    H/O cervical spine surgery     Abnormality of gait due to impairment of balance     S/P cervical spinal fusion      Physician: Tobi Levin DO    Physician Orders: PT Eval and Treat   Medical Diagnosis from Referral:   H/O cervical spine surgery  - Primary     Z98.890     Evaluation Date: 2/13/2023  Authorization Period Expiration: 2/9/2023 - 2/9/3034  Plan of Care Expiration: 4/10/2023  Visit # / Visits authorized: 1/1    Time In: 1515  Time Out: 1600  Total Billable Time: 45 minutes    Precautions: Standard, Fall, and Post-op cervical fusion  Post-op week 3; Per patient's dad, MD orders are as follows: wear brace to sleep and to eat, try to take brace off when he's sitting up, maybe off a little when he's walking.  Per [facility] Protocol weeks 0-8:   1. Prevent excessive initial mobility or stress on tissues  2. Limit overhead arm movements, bending and lifting.  3. Please follow physician recommendations regarding use of collars etc. (multilevel fusions hard  collar for 6 wks; one-level fusions wear a collar as needed for a week or two)  No prone, no bridging, no lying with a pillow, no active neck movement for a few weeks.   Subjective   Date of onset: Neurological symptoms started in April of 2022. Cervical fusion on 1/17/2023  History of current condition - Garo's step dad reports: Garo is nonverbal and presents with dad who gave history. In April 2022 Garo's parent's got a phone call that he was in the hospital in [place]. He was found on the side of the road after having a seizure and possible stroke. He has a history of prior seizures that was not disclosed to his family. He moved back to [place] at the end of April and gradually developed weakness and tone in both  arms and foot drop in his right foot.  He has been getting medical care and established a PCP. Family still hasn't figured out why he lost his voice however he can text and is cognitively intact. He got an MRI a few weeks ago and saw changes in his cervical spine. Ultimately, he underwent surgery to remove compression on his spinal cord. He is able to walk with less gait deviations (no more foot drop). He used to be hypersensitive (pain 15/10 with light touch to different parts of his body) but this has improved and is about 2-3/10 mostly in his neck due to his surgery. His family wants to know what's going on with his voice and wants to improve his hand function. He still has light sensitivity and flourescent lights bother him more than regular lights. He reports weakness in his hands, knees and hips but foot drop has gotten better. No falls or near falls recently. He is just now starting to get more comfortable walking around and getting back into daily activities but he is still a little nervous to take brace off when walking because he doesn't want to mess up his surgery.        Per chart; Neurosurgery Office Visit on 2/6/2023:  The patient is status post C3-4 with C3-6 posterior fusion performed uneventfully on January 17, 2023.  He and his father report that he has improved hand function, improved gait, and resolved preoperative left neck pain.  He reports improving incision pain.  Denies new weakness or numbness. Denies fevers chills malaise.    Postoperative x-rays today reviewed implants in good position no hardware complication     Medical History:   Past Medical History:   Diagnosis Date    Abnormal gait     rt foot turns out    Anxious depression     Back pain     Bipolar disorder, unspecified     Contracture of hand     rosi post seizure    Depression     Migraines     Neck pain     radiates to both arms numbness/tingling in both arms more on left arm    Nonverbal     since seizure    Photosensitivity      wears dark glasses    Schizophrenia, unspecified     Seizures     last witness seizure May 2022       Surgical History:   Phong Fofana  has a past surgical history that includes Tympanostomy tube placement; Wrist surgery (Left); Spine surgery; Fusion of cervical spine by posterior approach (N/A, 1/17/2023); and Cervical laminectomy with spinal fusion (N/A, 1/17/2023).    Medications:   Phong has a current medication list which includes the following prescription(s): baclofen, cefadroxil, gabapentin, levetiracetam, endocet, oxycodone-acetaminophen, and quetiapine.    Allergies:   Review of patient's allergies indicates:  No Known Allergies     Imaging, X-Ray Cervical Spine AP and Lateral: 1/17/2023  IMPRESSION:  1. Operative surgery of the cervical spine with spinal fixation rods and screw fixation running from C2 through C5.  2. No evidence of unusual postoperative complication.    Prior Therapy: none so far  Social History: lives with mom and step dad   Falls: 0   DME: none   Home Environment: [facility] with 12 steps to enter   Exercise Routine / History: none reported   Family Present at time of Eval: step bryanna   Occupation: hasn't worked since he was hospitalized in [place] (before April 2022)  Prior Level of Function: independent  Current Level of Function: about 20% of PLOF.     Pain:  Current 4/10, worst 5/10, best 2/10   Location: left neck 2/10 in left shoulder, same as prior  Description: surgical  Aggravating Factors: over exertion  Easing Factors: Medication (oxycodone, baclofen, tylenol)    Patient's goals: improve neck and shoulder mobility and walking    Objective     **Pain in neck increased to 5/10 with manual muscle testing in legs. Upper extremity AROM is WNL but caused increase in neck pain to 5/10. Cervical PROM assessed in sitting per patient request for comfort and limited to 0-5 degrees in all planes.     Posture:   Sitting: WNL with miami collar in place  Standing: WNL    Transfers:   Mat mobility not assessed due to cervical precautions. Able to perform sit <> stand with independence    Strength:      Right Lower Extremity Strength Grade Left Lower Extremity Strength Grade   Hip Flexion 4+/5 Hip Flexion 4+/5   Hip Extension Not assessed Hip Extension Not assessed   Hip Abduction 4+/5 Hip Abduction 4+/5   Hip Adduction 4+/5 Hip Adduction 4+/5   Hip Internal Rotation Not assessed Hip Internal Rotation Not assessed   Hip External Rotation Not assessed Hip External Rotation Not assessed   Knee Flexion 4/5 Knee Flexion 4/5   Knee Extension 4+/5 Knee Extension 4+/5   Ankle Dorsiflexion 4-/5 Ankle Dorsiflexion 4/5   Ankle Plantarflexion 4/5 Ankle Plantarflexion 4/5   Ankle Inversion Not assessed Ankle Inversion Not assessed   Ankle Eversion Not assessed Ankle Eversion Not assessed     Coordination: Grossly intact, decreased motor control of right leg with gait    Light touch sensation:  numbness and tingling in left arm and leg; denies decrease in sensitivity    Proprioception: Plan to assess further at next visit    Balance:    Static sitting balance:Normal  Dynamic sitting balance: Good    Static standing Balance: Normal  Dynamic standing balance: Good    Ambulation:     Gait Assessment:   - AD used: none  - Assistance: supervision  - Distance: 100  - Speed: to be assessed  - Stairs: to be assessed    Gait Analysis:  Upon observation of gait, patient demonstrates deviations including but not limited to: Inconsistent right leg control, decreased trunk rotation and reciprocal arm swing, wide base of support    Impairments contributing to deviations:  sensory deficits and impaired motor control      Outcome Measures:      Evaluation   6 Minute Walk Test To be assessed at next visit   Functional Gait Assessment  To be assessed at next visit           CMS Impairment/Limitation/Restriction for FOTO Non-Traumatic CNS Dysfunction Survey    Therapist reviewed FOTO scores for Phong Fofana on 2/13/2023.    FOTO documents entered into [employer] - see Media section.    Limitation Score: 33%         TREATMENT   No treatment performed on evaluation date due to time constraints. Time spent gathering pertinent patient information and assessing deficits to formulate PT POC.    Assessment   Phong is a 33 y.o. male referred to outpatient Physical Therapy with a medical diagnosis of s/p C3-6 posterior fusion. Patient presents with post-surgical pain, decreased cervical range of motion, decreased right ankle dorsiflexion strength vs left side, gait abnormalities listed above, and decreased activity tolerance.     Patient prognosis is Excellent.   Patient will benefit from skilled outpatient Physical Therapy to address the deficits stated above and in the chart below, provide patient/family education, and to maximize patient's level of independence.     Plan of care discussed with patient: Yes  Patient's spiritual, cultural and educational needs considered and patient is agreeable to the plan of care and goals as stated below:     Anticipated Barriers for therapy: None    Medical Necessity is demonstrated by the following  History  Co-morbidities and personal factors that may impact the plan of care Co-morbidities:   young age    Personal Factors:   no deficits     low   Examination  Body Structures and Functions, activity limitations and participation restrictions that may impact the plan of care Body Regions:   neck  back  lower extremities  upper extremities    Body Systems:    ROM  strength  gross coordinated movement  balance  gait  motor control    Participation Restrictions:   Patient hasn't been able to return to work since initial symptom onset in April of 2022.    Activity limitations:   Learning and applying knowledge  no deficits    General Tasks and Commands  no deficits    Communication  communicating with/receiving spoken language    Mobility  lifting and carrying objects  fine hand use (grasping/picking  up)  walking  driving (bike, car, motorcycle)    Self care  no deficits    Domestic Life  no deficits    Interactions/Relationships  no deficits    Life Areas  no deficits    Community and Social Life  no deficits         moderate   Clinical Presentation stable and uncomplicated low   Decision Making/ Complexity Score: low     Goals:  Short Term Goals: 4 weeks   Garo will report less difficulty with moderate activities like moving a table or pushing a vacuum on her FOTO to show improvements with his function.  Garo will perform 6 Minute walk test to assess ambulatory endurance and establish appropriate goals.  Garo will perform Functional Gait Assessment to assess dynamic balance and fall risk with walking in the home and community.   Garo will report being able to tolerate 15 minutes of exercise and cardiovascular activity without increases in his neck pain.     Long Term Goals: 8 weeks   Garo will improve is FOTO limitation score to 28% to show improvements in his overall functional mobility.  Garo will report and demonstrate understanding of proper body mechanics with squatting and lifting activities.  Garo will tolerate active cervical range of motion to 25-50% of total range of motion in all planes.  Garo will be able to maintain a chin tuck for 10 seconds without increases in pain.  Garo will report being able to tolerate 30 minutes of exercise and cardiovascular activity without increases in his neck pain.     Plan   Plan of care Certification: 2/13/2023 to 4/10/2023.    Outpatient Physical Therapy 2 times weekly for 8 weeks to include the following interventions: Cervical/Lumbar Traction, Electrical Stimulation , Gait Training, Manual Therapy, Moist Heat/ Ice, Neuromuscular Re-ed, Orthotic Management and Training, Patient Education, Therapeutic Activities, and Therapeutic Exercise.     Yanna Wiggins, PT

## 2023-02-16 ENCOUNTER — CLINICAL SUPPORT (OUTPATIENT)
Dept: REHABILITATION | Facility: HOSPITAL | Age: 33
End: 2023-02-16
Payer: MEDICAID

## 2023-02-16 DIAGNOSIS — R26.89 ABNORMALITY OF GAIT DUE TO IMPAIRMENT OF BALANCE: Primary | ICD-10-CM

## 2023-02-16 DIAGNOSIS — Z98.1 S/P CERVICAL SPINAL FUSION: ICD-10-CM

## 2023-02-16 PROCEDURE — 97112 NEUROMUSCULAR REEDUCATION: CPT | Mod: PN

## 2023-02-16 PROCEDURE — 97140 MANUAL THERAPY 1/> REGIONS: CPT | Mod: PN

## 2023-02-16 PROCEDURE — 97530 THERAPEUTIC ACTIVITIES: CPT | Mod: PN

## 2023-02-16 NOTE — PROGRESS NOTES
[facility]   Physical Therapy Treatment Note     Name: Phong Fofana  [facility] Number: [id number]    Therapy Diagnosis:   Encounter Diagnoses   Name Primary?    Abnormality of gait due to impairment of balance Yes    S/P cervical spinal fusion      Physician: Tobi Levin DO    Visit Date: 2/16/2023    Physician Orders: PT Eval and Treat   Medical Diagnosis from Referral:   H/O cervical spine surgery  - Primary     Z98.890      Evaluation Date: 2/13/2023  Authorization Period Expiration: 2/9/2023 - 2/9/3034  Plan of Care Expiration: 4/10/2023  Visit # / Visits authorized: 1/1    PTA Visit #: 0/5     Time In: 0930  Time Out: 1015  Total Billable Time: 45 minutes    Precautions: Standard, Fall, and Post-op cervical fusion  Surgery date: 1/17/23 (Post-op week 3); Per patient's dad, MD orders are as follows: wear brace to sleep and to eat, try to take brace off when he's sitting up, maybe off a little when he's walking.  Per [facility] Protocol weeks 0-8:   1. Prevent excessive initial mobility or stress on tissues  2. Limit overhead arm movements, bending and lifting.  3. Please follow physician recommendations regarding use of collars etc. (multilevel fusions hard  collar for 6 wks; one-level fusions wear a collar as needed for a week or two)  No prone, no bridging, no lying with a pillow, no active neck movement for a few weeks.     SUBJECTIVE     Pt reports: He has been feeling fine since his evaluation. Activity did not cause major increase in pain or symptoms and he has continued to walk his dog for about 25 minutes daily with no increases in pain before needing to rest.    He  was not  given a home exercise program on evaluation.  Response to previous treatment: tolerated well  Functional change: none reported    Pain: 2/10  Location: neck      OBJECTIVE     Objective Measures updated at progress report unless specified.     Treatment     Garo received the treatments  "listed below:      manual therapy techniques: Soft tissue Mobilization and passive range of motion were applied to the: cervical and thoracic spine for 11 minutes, including:   - PROM cervical flexion, extension and rotation to tolerance  - AAROM cervical flexion, extension and rotation to tolerance  - Supine pectoral stretch over towel roll x30 seconds    neuromuscular re-education activities to improve: Coordination, Sense, Proprioception, and Posture for 8 minutes. The following activities were included:  - supine lat isometrics 2x10x5 second hold  - diaphragmatic breathing   - Hip hinges with PVC pipe at back for tactile cuing of neutral spine alignment x5    therapeutic activities to improve functional performance for 26  minutes, includin Minute Walk Test (6MWT): 1466'09"    Functional Gait Assessment (FGA):   1. Gait on level surface =  2   (3) Normal: less than 5.5 sec, no A.D., no imbalance, normal gait pattern, deviates< 6in   (2) Mild impairment: 7-5.6 sec, uses A.D., mild gait deviations, or deviates 6-10 in   (1) Moderate impairment: > 7 sec, slow speed, imbalance, deviates 10-15 in.   (0) Severe impairment: needs assist, deviates >15 in, reach/touch wall  2. Change in Gait Speed = 3   (3) Normal: smooth change w/o loss of balance or gait deviation, deviates < 6 in, significant difference between speeds   (2) Mild impairment: changes speed, but demonstrates mild gait deviations, deviates 6-10 in, OR no deviations but unable to significantly speed, OR uses A.D.   (1) Moderate impairment: minor changes to speed, OR changes speed w/ significant deviations, deviates 10-15 in, OR  Changes speed , but loses balance & recovers   (0) Severe impairment: cannot change speed, deviates >15 in, or loses balance & needs assist  3. Gait with horizontal head turns  = 0 (not performed due to cervical restrictions)   (3) Normal: no change in gait, deviates <6 in   (2) Mild impairment: slight change in speed, " deviates 6-10 in, OR uses A.D.   (1) Moderate impairment: moderate change in speed, deviates 10-15 in   (0) Severe impairment: severe disruption of gait, deviates >15in  4. Gait with vertical head turns = 0 (not performed due to cervical restrictions)   (3) Normal: no change in gait, deviates <6 in   (2) Mild impairment: slight change in speed, deviates 6-10 in OR uses A.D.   (1) Moderate impairment: moderate change in speed, deviates 10-15 in   (0) Severe impairment: severe disruption of gait, deviates >15 in  5. Gait with pivot turns = 1   (3) Normal: performs safely in 3 sec, no LOB   (2) Mild impairment: performs in >3 sec & no LOB, OR turns safely & requires several steps to regain LOB   (1) Moderate impairment: turns slow, OR requires several small steps for balance following turn & stop   (0) Severe impairment: cannot turn safely, needs assist  6. Step over obstacle = 1   (3) Normal: steps over 2 stacked boxes w/o change in speed or LOB   (2) Mild impairment: able to step over 1 box w/o change in speed or LOB   (1) Moderate impairment: steps over 1 box but must slow down, may require VC   (0) Severe impairment: cannot perform w/o assist  7. Gait with Narrow ARIAS = 0 (unable to connect steps heel to toe)   (3) Normal: 10 steps no staggering   (2) Mild impairment: 7-9 steps   (1) Moderate impairment: 4-7 steps   (0) Severe impairment: < 4 steps or cannot perform w/o assist  8. Gait with eyes closed = 2   (3) Normal: < 7 sec, no A.D., no LOB, normal gait pattern, deviates <6 in   (2) Mild impairment: 7.1-9 sec, mild gait deviations, deviates 6-10 in   (1) Moderate impairment: > 9 sec, abnormal pattern, LOB, deviates 10-15 in   (0) Severe impairment: cannot perform w/o assist, LOB, deviates >15in  9. Ambulating Backwards = 2   (3) Normal: no A.D., no LOB, normal gait pattern, deviates <6in   (2) Mild impairment: uses A.D., slower speed, mild gait deviations, deviates 6-10 in   (1) Moderate impairment: slow speed,  abnormal gait pattern, LOB, deviates 10-15 in   (0) Severe impairment: severe gait deviations or LOB, deviates >15in  10. Steps = 2   (3) Normal: alternating feet, no rail   (2) Mild Impairment: alternating feet, uses rail   (1) Moderate impairment: step-to, uses rail   (0) Severe impairment: cannot perform safely    Score 13/30     Score:   <22/30 fall risk   <20/30 fall risk in older adults   <18/30 fall risk in Parkinsons       Patient Education and Home Exercises     Home Exercises Provided and Patient Education Provided     Education provided:   - Maintain physical activity and walking outside of therapy.  - Perform diaphragmatic breathing to assist in relaxation and decrease use of accessory muscles for breathing.  - Use ice as needed to assist with pain and swelling.    Written Home Exercises Provided: yes. Exercises were reviewed and Garo was able to demonstrate them prior to the end of the session.  Garo demonstrated fair  understanding of the education provided. See EMR under Patient Instructions for exercises provided during therapy sessions    ASSESSMENT     Garo put forth great effort in participating in today's session. He tolerated the session well with a mild increase in his pain from 2/10 at start of session to 4-5/10 following activity however he denied offer for ice or heat to help alleviate the pain. He tolerated PROM and AAROM to his cervical spine well and demonstrated good activation of cervical musculature however required cuing to not over strain his muscles. He performed well on the 6MWT and was able to complete it without any standing rest breaks. His score on the FGA, however, indicates that he is at an increased risk of falling. He was unable to perform 2 items on the test which required head turns and was unable to connect more than 2 steps heel-to-toe with tandem walking. He was given a home exercise program on today and expressed good understanding of the exercises prescribed. He  continues to be appropriate for outpatient physical therapy to address his cervical and functional deficits.     Garo Is progressing well towards his goals.   Pt prognosis is Excellent.     Pt will continue to benefit from skilled outpatient physical therapy to address the deficits listed in the problem list box on initial evaluation, provide pt/family education and to maximize pt's level of independence in the home and community environment.     Pt's spiritual, cultural and educational needs considered and pt agreeable to plan of care and goals.     Anticipated barriers to physical therapy: None    Goals:   Short Term Goals: 4 weeks   Garo will report less difficulty with moderate activities like moving a table or pushing a vacuum on her FOTO to show improvements with his function. Ongoing.  Garo will perform 6 Minute walk test to assess ambulatory endurance and establish appropriate goals. Met  Garo will perform Functional Gait Assessment to assess dynamic balance and fall risk with walking in the home and community. Met  Garo will report being able to tolerate 15 minutes of exercise and cardiovascular activity without increases in his neck pain. Met     Long Term Goals: 8 weeks   Garo will improve is FOTO limitation score to 28% to show improvements in his overall functional mobility. Ongoing  Garo will report and demonstrate understanding of proper body mechanics with squatting and lifting activities. Ongoing  Garo will tolerate active cervical range of motion to 25-50% of total range of motion in all planes. Ongoing  Garo will be able to maintain a chin tuck for 10 seconds without increases in pain. Ongoing  Garo will report being able to tolerate 30 minutes of exercise and cardiovascular activity without increases in his neck pain. Ongoing  Garo will improve his distance on the 6MWT to 1616 to show improvements in his ambulatory endurance. (MDC for spinal cord injury = 45.8 m or ~150 feet).  Established  Garo will improve his score on the FGA to 18/30 to show improvements in his dynamic balance with gait activities and his fall risk. Established    PLAN     Plan of care Certification: 2/13/2023 to 4/10/2023.     Outpatient Physical Therapy 2 times weekly for 8 weeks to include the following interventions: Cervical/Lumbar Traction, Electrical Stimulation, Gait Training, Manual Therapy, Moist Heat/ Ice, Neuromuscular Re-ed, Orthotic Management and Training, Patient Education, Therapeutic Activities, and Therapeutic Exercise.     Yanna Wiggins, PT

## 2023-02-20 ENCOUNTER — CLINICAL SUPPORT (OUTPATIENT)
Dept: REHABILITATION | Facility: HOSPITAL | Age: 33
End: 2023-02-20
Payer: MEDICAID

## 2023-02-20 DIAGNOSIS — R26.89 ABNORMALITY OF GAIT DUE TO IMPAIRMENT OF BALANCE: Primary | ICD-10-CM

## 2023-02-20 DIAGNOSIS — Z98.1 S/P CERVICAL SPINAL FUSION: ICD-10-CM

## 2023-02-20 PROCEDURE — 97112 NEUROMUSCULAR REEDUCATION: CPT | Mod: PN

## 2023-02-20 PROCEDURE — 97140 MANUAL THERAPY 1/> REGIONS: CPT | Mod: PN

## 2023-02-20 PROCEDURE — 97110 THERAPEUTIC EXERCISES: CPT | Mod: PN

## 2023-02-20 NOTE — PROGRESS NOTES
[facility]   Physical Therapy Treatment Note     Name: Phong Fofana  [facility] Number: [id number]    Therapy Diagnosis:   Encounter Diagnoses   Name Primary?    Abnormality of gait due to impairment of balance Yes    S/P cervical spinal fusion        Physician: Tobi Levin DO    Visit Date: 2/20/2023    Physician Orders: PT Eval and Treat   Medical Diagnosis from Referral:   H/O cervical spine surgery  - Primary     Z98.890      Evaluation Date: 2/13/2023  Authorization Period Expiration: 2/9/2023 - 2/9/3034  Plan of Care Expiration: 4/10/2023  Visit # / Visits authorized: 2/20 (3)    PTA Visit #: 0/5     Time In: 0930  Time Out: 1015  Total Billable Time: 45 minutes    Precautions: Standard, Fall, and Post-op cervical fusion  Surgery date: 1/17/23 (Post-op week 5); Per patient's dad, MD orders are as follows: wear brace to sleep and to eat, try to take brace off when he's sitting up, maybe off a little when he's walking.  Per [place] [facility] Protocol weeks 0-8:   1. Prevent excessive initial mobility or stress on tissues  2. Limit overhead arm movements, bending and lifting.  3. Please follow physician recommendations regarding use of collars etc. (multilevel fusions hard collar for 6 wks; one-level fusions wear a collar as needed for a week or two)  No prone, no bridging, no lying with a pillow, no active neck movement for a few weeks.     SUBJECTIVE     Pt reports: He has been feeling good since his last session. No increased pain following and has continued walking and home exercises with no issues.     He  was not  given a home exercise program on evaluation.  Response to previous treatment: tolerated well  Functional change: none reported    Pain: 3/10  Location: neck      OBJECTIVE     Objective Measures updated at progress report unless specified.     Treatment     Garo received the treatments listed below:      manual therapy techniques: Soft tissue Mobilization  and passive range of motion were applied to the: cervical and thoracic spine for 12 minutes, including:   - PROM cervical flexion, extension, sidebending and rotation to tolerance; limited pain-free range of motion  - AAROM cervical flexion, extension, side bending and rotation to tolerance; limited pain-free range of motion  - Supine pectoral stretch over towel roll x60 seconds    neuromuscular re-education activities to improve: Coordination, Sense, Proprioception, and Posture for 20 minutes. The following activities were included:  - supine lat isometrics 3x10x5 second hold  - Sit to stands from elevated mat with PVC pipe at back for tactile cuing of neutral spine alignment 2x15; mat lowered for second set  - Deadlifts with 2# weighted bar and PT holding PVC pipe at back for tactile cuing for neutral spinal alignment  - Marching in parallel bars 2 laps, first with light BUE support, 2nd lap with arms crossed over chest  - Tandem walking in parallel bars 2 laps with light BUE support    Garo received therapeutic exercises to develop strength, endurance, ROM, and flexibility for 13 minutes including:  - Nu step level 4 legs only 13 minutes with RPE 7/10 and instructed to keep SPM >80      Patient Education and Home Exercises     Home Exercises Provided and Patient Education Provided     Education provided:   - Maintain physical activity and walking outside of therapy.  - Perform diaphragmatic breathing to assist in relaxation and decrease use of accessory muscles for breathing.  - Use ice as needed to assist with pain and swelling.    Written Home Exercises Provided: yes. Instructed to continue home exercise program given on 2/16/23. See EMR under Patient Instructions for exercises provided during therapy sessions    ASSESSMENT     Garo put forth great effort in participating in today's session. He tolerated the session well with a mild increase in his pain from 3/10 at start of session to 4/10 with manual  therapy which returned to 3/10 by end of session. He demonstrated good form with sit to stands and dead lifts with tactile cuing for neutral spine alignment. He also performed well with dynamic gait activities however reported that he felt very challenged by them. He continues to be appropriate for outpatient physical therapy to address his cervical and functional deficits.     Garo Is progressing well towards his goals.   Pt prognosis is Excellent.     Pt will continue to benefit from skilled outpatient physical therapy to address the deficits listed in the problem list box on initial evaluation, provide pt/family education and to maximize pt's level of independence in the home and community environment.     Pt's spiritual, cultural and educational needs considered and pt agreeable to plan of care and goals.     Anticipated barriers to physical therapy: None    Goals:   Short Term Goals: 4 weeks   Garo will report less difficulty with moderate activities like moving a table or pushing a vacuum on her FOTO to show improvements with his function. Ongoing.  Garo will perform 6 Minute walk test to assess ambulatory endurance and establish appropriate goals. Met  Garo will perform Functional Gait Assessment to assess dynamic balance and fall risk with walking in the home and community. Met  Garo will report being able to tolerate 15 minutes of exercise and cardiovascular activity without increases in his neck pain. Met     Long Term Goals: 8 weeks   Garo will improve is FOTO limitation score to 28% to show improvements in his overall functional mobility. Ongoing  Garo will report and demonstrate understanding of proper body mechanics with squatting and lifting activities. Ongoing  Garo will tolerate active cervical range of motion to 25-50% of total range of motion in all planes. Ongoing  Garo will be able to maintain a chin tuck for 10 seconds without increases in pain. Ongoing  Garo will report being able to  tolerate 30 minutes of exercise and cardiovascular activity without increases in his neck pain. Ongoing  Garo will improve his distance on the 6MWT to 1616 to show improvements in his ambulatory endurance. (MDC for spinal cord injury = 45.8 m or ~150 feet). Established  Garo will improve his score on the FGA to 18/30 to show improvements in his dynamic balance with gait activities and his fall risk. Established    PLAN     Plan of care Certification: 2/13/2023 to 4/10/2023.     Outpatient Physical Therapy 2 times weekly for 8 weeks to include the following interventions: Cervical/Lumbar Traction, Electrical Stimulation, Gait Training, Manual Therapy, Moist Heat/ Ice, Neuromuscular Re-ed, Orthotic Management and Training, Patient Education, Therapeutic Activities, and Therapeutic Exercise.     Yanna Wiggins, PT

## 2023-02-23 ENCOUNTER — CLINICAL SUPPORT (OUTPATIENT)
Dept: REHABILITATION | Facility: HOSPITAL | Age: 33
End: 2023-02-23
Payer: MEDICAID

## 2023-02-23 DIAGNOSIS — Z98.1 S/P CERVICAL SPINAL FUSION: ICD-10-CM

## 2023-02-23 DIAGNOSIS — R26.89 ABNORMALITY OF GAIT DUE TO IMPAIRMENT OF BALANCE: Primary | ICD-10-CM

## 2023-02-23 PROCEDURE — 97110 THERAPEUTIC EXERCISES: CPT | Mod: PN

## 2023-02-23 PROCEDURE — 97140 MANUAL THERAPY 1/> REGIONS: CPT | Mod: PN

## 2023-02-23 PROCEDURE — 97112 NEUROMUSCULAR REEDUCATION: CPT | Mod: PN

## 2023-02-23 NOTE — PROGRESS NOTES
[facility]   Physical Therapy Treatment Note     Name: Phong Fofana  [facility] Number: [id number]    Therapy Diagnosis:   Encounter Diagnoses   Name Primary?    Abnormality of gait due to impairment of balance Yes    S/P cervical spinal fusion      Physician: Tobi Levin DO    Visit Date: 2/23/2023    Physician Orders: PT Eval and Treat   Medical Diagnosis from Referral:   H/O cervical spine surgery  - Primary     Z98.890      Evaluation Date: 2/13/2023  Authorization Period Expiration: 2/9/2023 - 2/9/3034  Plan of Care Expiration: 4/10/2023  Visit # / Visits authorized: 3/20 (4)    PTA Visit #: 0/5     Time In: 0935 (delayed start to finish prior eval)  Time Out: 1020  Total Billable Time: 45 minutes    Precautions: Standard, Fall, and Post-op cervical fusion  Surgery date: 1/17/23 (Post-op week 5); Per patient's dad, MD orders are as follows: wear brace to sleep and to eat, try to take brace off when he's sitting up, maybe off a little when he's walking.  Per [place] [facility] Protocol weeks 0-8:   1. Prevent excessive initial mobility or stress on tissues  2. Limit overhead arm movements, bending and lifting.  3. Please follow physician recommendations regarding use of collars etc. (multilevel fusions hard collar for 6 wks; one-level fusions wear a collar as needed for a week or two)  No prone, no bridging, no lying with a pillow, no active neck movement for a few weeks.     SUBJECTIVE     Pt reports: He has been feeling good since his last session. No increased pain following and has continued walking and home exercises with no issues.     He  was not  given a home exercise program on evaluation.  Response to previous treatment: tolerated well  Functional change: none reported    Pain: 4/10  Location: neck      OBJECTIVE     Objective Measures updated at progress report unless specified.     Treatment     Garo received the treatments listed below:      manual therapy  techniques: Soft tissue Mobilization and passive range of motion were applied to the: cervical and thoracic spine for 10 minutes, including:   - PROM cervical flexion, extension, sidebending and rotation to tolerance; limited pain-free range of motion  - AAROM cervical flexion, extension, side bending and rotation to tolerance; limited pain-free range of motion    neuromuscular re-education activities to improve: Coordination, Sense, Proprioception, and Posture for 20 minutes. The following activities were included:  - supine lat isometrics 3x10x5 second hold  - Deadlifts with 2# weighted bar and PT holding PVC pipe at back for tactile cuing for neutral spinal alignment 3x10  - Marching in parallel bars standing on blue airex pad x15  - Step taps to plastic cup with instructions to touch lightly while standing on blue airex pad 2x15; intermittent BUE support on 1st set, RUE support only on 2nd set  - Alternating lunges to bosu round side up 2x10; BUE support to RUE support on 1st set, no UE support on 2nd set    Garo received therapeutic exercises to develop strength, endurance, ROM, and flexibility for 15 minutes including:  - Nu step level 4 legs only 15 minutes with RPE 7/10 and instructed to keep SPM >90; impaired BLE control with fatigue, began slamming pedals intermittently, instructed to decr SPM to 80-90      Patient Education and Home Exercises     Home Exercises Provided and Patient Education Provided     Education provided:   - Maintain physical activity and walking outside of therapy.  - Perform diaphragmatic breathing to assist in relaxation and decrease use of accessory muscles for breathing.  - Use ice as needed to assist with pain and swelling.    Written Home Exercises Provided: yes. Instructed to continue home exercise program given on 2/16/23. See EMR under Patient Instructions for exercises provided during therapy sessions    ASSESSMENT     Garo put forth great effort in participating in  today's session. He tolerated the session well with no increase in his pain throughout the session. He demonstrated improved form with deadlifts when provided with visual demonstration from sagittal view and with decreased range of motion. He also performed well with dynamic balance activities when provided upper extremity support and would benefit from continued practice with decreased support.     Garo Is progressing well towards his goals.   Pt prognosis is Excellent.     Pt will continue to benefit from skilled outpatient physical therapy to address the deficits listed in the problem list box on initial evaluation, provide pt/family education and to maximize pt's level of independence in the home and community environment.     Pt's spiritual, cultural and educational needs considered and pt agreeable to plan of care and goals.     Anticipated barriers to physical therapy: None    Goals:   Short Term Goals: 4 weeks   Garo will report less difficulty with moderate activities like moving a table or pushing a vacuum on her FOTO to show improvements with his function. Ongoing.  Garo will perform 6 Minute walk test to assess ambulatory endurance and establish appropriate goals. Met  Garo will perform Functional Gait Assessment to assess dynamic balance and fall risk with walking in the home and community. Met  Garo will report being able to tolerate 15 minutes of exercise and cardiovascular activity without increases in his neck pain. Met     Long Term Goals: 8 weeks   Garo will improve is FOTO limitation score to 28% to show improvements in his overall functional mobility. Ongoing  Garo will report and demonstrate understanding of proper body mechanics with squatting and lifting activities. Ongoing  Garo will tolerate active cervical range of motion to 25-50% of total range of motion in all planes. Ongoing  Garo will be able to maintain a chin tuck for 10 seconds without increases in pain. Ongoing  Garo  will report being able to tolerate 30 minutes of exercise and cardiovascular activity without increases in his neck pain. Ongoing  Garo will improve his distance on the 6MWT to 1616 to show improvements in his ambulatory endurance. (MDC for spinal cord injury = 45.8 m or ~150 feet). Established  Garo will improve his score on the FGA to 18/30 to show improvements in his dynamic balance with gait activities and his fall risk. Established    PLAN     Plan of care Certification: 2/13/2023 to 4/10/2023.     Outpatient Physical Therapy 2 times weekly for 8 weeks to include the following interventions: Cervical/Lumbar Traction, Electrical Stimulation, Gait Training, Manual Therapy, Moist Heat/ Ice, Neuromuscular Re-ed, Orthotic Management and Training, Patient Education, Therapeutic Activities, and Therapeutic Exercise.     Yanna Wiggins, PT

## 2023-02-28 ENCOUNTER — CLINICAL SUPPORT (OUTPATIENT)
Dept: REHABILITATION | Facility: HOSPITAL | Age: 33
End: 2023-02-28
Payer: MEDICAID

## 2023-02-28 ENCOUNTER — CLINICAL SUPPORT (OUTPATIENT)
Dept: REHABILITATION | Facility: HOSPITAL | Age: 33
End: 2023-02-28
Attending: NEUROLOGICAL SURGERY
Payer: MEDICAID

## 2023-02-28 DIAGNOSIS — R53.1 DECREASED STRENGTH: ICD-10-CM

## 2023-02-28 DIAGNOSIS — M25.60 DECREASED RANGE OF MOTION: ICD-10-CM

## 2023-02-28 DIAGNOSIS — R27.8 DECREASED COORDINATION: ICD-10-CM

## 2023-02-28 DIAGNOSIS — R26.89 ABNORMALITY OF GAIT DUE TO IMPAIRMENT OF BALANCE: Primary | ICD-10-CM

## 2023-02-28 DIAGNOSIS — Z98.1 S/P CERVICAL SPINAL FUSION: ICD-10-CM

## 2023-02-28 DIAGNOSIS — M24.549 CONTRACTURE OF HAND: ICD-10-CM

## 2023-02-28 PROCEDURE — 97110 THERAPEUTIC EXERCISES: CPT | Mod: PN,CQ

## 2023-02-28 PROCEDURE — 97166 OT EVAL MOD COMPLEX 45 MIN: CPT | Mod: PN

## 2023-02-28 NOTE — PROGRESS NOTES
[facility]   Physical Therapy Treatment Note     Name: Phong Fofana  [facility] Number: [id number]    Therapy Diagnosis:   Encounter Diagnoses   Name Primary?    Abnormality of gait due to impairment of balance Yes    S/P cervical spinal fusion      Physician: Tobi Levin DO    Visit Date: 2/28/2023    Physician Orders: PT Eval and Treat   Medical Diagnosis from Referral:   H/O cervical spine surgery  - Primary     Z98.890      Evaluation Date: 2/13/2023  Authorization Period Expiration: 2/9/2023 - 2/9/3034  Plan of Care Expiration: 4/10/2023  Visit # / Visits authorized: 4/20 (5)    PTA Visit #: 0/5     Time In: 0930  Time Out: 1015  Total Billable Time: 45 minutes    Precautions: Standard, Fall, and Post-op cervical fusion  Surgery date: 1/17/23 (Post-op week 5); Per patient's dad, MD orders are as follows: wear brace to sleep and to eat, try to take brace off when he's sitting up, maybe off a little when he's walking.  Per [place] [facility] Protocol weeks 0-8:   1. Prevent excessive initial mobility or stress on tissues  2. Limit overhead arm movements, bending and lifting.  3. Please follow physician recommendations regarding use of collars etc. (multilevel fusions hard collar for 6 wks; one-level fusions wear a collar as needed for a week or two)  No prone, no bridging, no lying with a pillow, no active neck movement for a few weeks.     SUBJECTIVE     Pt reports: patient indicating neck pain somewhat better.  He was compliant with home exercise program  Response to previous treatment: no problems stated  Functional change: ongoing    Pain: 3/10  Location: neck      OBJECTIVE     Objective Measures updated at progress report unless specified.     Treatment     Garo received the treatments listed below:      Garo received therapeutic exercises to develop strength, endurance, range of motion, and flexibility for 25 minutes including:    NuStep Level 5(10 minutes) to Level  4(8 minutes) Bilateral Lower Extremities only Rate of Perceived Exertion 7/10    Seated:  Hamstring stretch with foot on stool performing dorsiflexion 1 minute Right Left     Wall slides with Theraball 10 times and 5 times with 3 second hold    neuromuscular re-education activities to improve: Balance, Coordination,  Proprioception, and Posture for 20 minutes. The following activities were included:    Parallel bars:  Bilateral heel raises/toe raises on foam cushion 1 minute light upper extremity support  Static stand on foam cushion Feet together Eyes open 2 minutes without upper extremity support minimal to no sway  Tandem weight shift on foam cushion 2 sets of 1 minute Right Left with light upper extremity support  Tandem gait forward/backward 12 feet x 4  Floor ladder forward 12 feet x6 without upper extremity support  Floor ladder side step 12 feet Right Left x3    Gait 120 and 150 feet with verbal cues to increase step length    Patient Education and Home Exercises     Home Exercises Provided and Patient Education Provided     Education provided:   - Patient provided with verbal and demonstrative instruction for all activities performed in today's session.    Written Home Exercises Provided: Patient instructed to cont prior HEP.  See EMR under Patient Instructions for exercises provided during therapy sessions.    ASSESSMENT     Garo provided good participation and effort during today's session with treatment focused on lower extremity strengthening/endurance and balance activities. Garo tolerated activities with occasional cues to decrease speed and intensity with fair follow through.    Garo Is progressing well towards his goals.   Pt prognosis is Excellent.     Pt will continue to benefit from skilled outpatient physical therapy to address the deficits listed in the problem list box on initial evaluation, provide pt/family education and to maximize pt's level of independence in the home and community  environment.     Pt's spiritual, cultural and educational needs considered and pt agreeable to plan of care and goals.     Anticipated barriers to physical therapy: None    Goals:   Short Term Goals: 4 weeks   Garo will report less difficulty with moderate activities like moving a table or pushing a vacuum on her FOTO to show improvements with his function. Ongoing.  Garo will perform 6 Minute walk test to assess ambulatory endurance and establish appropriate goals. Met  Garo will perform Functional Gait Assessment to assess dynamic balance and fall risk with walking in the home and community. Met  Garo will report being able to tolerate 15 minutes of exercise and cardiovascular activity without increases in his neck pain. Met     Long Term Goals: 8 weeks   Garo will improve is FOTO limitation score to 28% to show improvements in his overall functional mobility. Ongoing  Garo will report and demonstrate understanding of proper body mechanics with squatting and lifting activities. Ongoing  Garo will tolerate active cervical range of motion to 25-50% of total range of motion in all planes. Ongoing  Garo will be able to maintain a chin tuck for 10 seconds without increases in pain. Ongoing  Garo will report being able to tolerate 30 minutes of exercise and cardiovascular activity without increases in his neck pain. progressing  Garo will improve his distance on the 6MWT to 1616 to show improvements in his ambulatory endurance. (MDC for spinal cord injury = 45.8 m or ~150 feet). Established  Garo will improve his score on the FGA to 18/30 to show improvements in his dynamic balance with gait activities and his fall risk. Established    PLAN     Plan of care Certification: 2/13/2023 to 4/10/2023.     Outpatient Physical Therapy 2 times weekly for 8 weeks to include the following interventions: Cervical/Lumbar Traction, Electrical Stimulation, Gait Training, Manual Therapy, Moist Heat/ Ice, Neuromuscular Re-ed,  Orthotic Management and Training, Patient Education, Therapeutic Activities, and Therapeutic Exercise.     Dorothea Carver, PTA

## 2023-02-28 NOTE — PLAN OF CARE
[facility]  Initial Neurological Evaluation     Date: 2/28/2023  Patient: Phong SALAMANCA [place]  Chart Number: [id number]    Therapy Diagnosis:   Encounter Diagnoses   Name Primary?    Decreased range of motion     Decreased coordination     Decreased strength     Contracture of hand      Physician: Tobi Levin DO    Physician Orders: OT eval and treat   Medical Diagnosis:   Z98.890 (ICD-10-CM) - History of cervical spinal surgery  Evaluation Date: 2/28/2023  Plan of Care Expiration Period: March 11th or 17th (pt's step dad couldn't remember which date) / Dr. Levin neurosurgery   Insurance Authorization period Expiration: 12/31/2023  Date of Return to MD: March 11th or 17th  / Dr. Levin neurosurgery   Visit # / Visits Authorized: 1 / 1  FOTO: to be assessed     Time In: 10:30 AM   Time Out: 11:15 AM  Total Billable (one on one) Time: 45 minutes    Precautions: Standard, Fall, Seizure, and Post-op cervical fusion  Post-op week 3; Per patient's dad, MD orders are as follows: wear brace to sleep and to eat, try to take brace off when he's sitting up, maybe off a little when he's walking.  Per [facility] Protocol weeks 0-8:   1. Prevent excessive initial mobility or stress on tissues  2. Limit overhead arm movements, bending and lifting.  3. Please follow physician recommendations regarding use of collars etc. (multilevel fusions hard  collar for 6 wks; one-level fusions wear a collar as needed for a week or two)  No prone, no bridging, no lying with a pillow, no active neck movement for a few weeks.     Subjective     History of Current Condition: Pt is nonverbal. He communicates via gestures and text messaging. Patient's dad is present and is giving history. Pt was found on the side of the road unconscious with potential seizure and stroke in April 2022. Since then he has lost his ability to speak, use of his hands, and and proper gait patterns.  Spinal surgery was  completed on Jan 17, 2023 to decompress cervical spine. He is beginning to gain use of his hands as they were contracted into fists prior to surgery. Contractures of PIP and DIP's present today. He presents with light sensitivity as well, so he wears sunglasses when he is out and about.      Involved Side: Both   Dominant Side: Right   Date of Onset: Problems began April 2022.   Surgical Procedure: Spinal decompression on Jan 17, 2023   Imaging: X Ray Cervical Spine 1/17/2023   Previous Therapy: None     Patient's Goals for Therapy: gain use of his hands, mow grass using the steering wheel and foot press, maybe drive a car again.     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 Has not worked since April 2022.     Functional Limitations/Social History:    Prior Level of Function: Prior to April 2022 complelty (I)   Current Level of Function: decreased function from April 22 to January 2023 - dependent. Since surgery in January 2023, his hands have opened but  he presents with contractures bilaterally.    Home/Living environment : lives with step dad and mom   Home Access: Duplex  with~12 stairs   DME: [place] cervical collar with precautions, has seat in shower, does not use      Leisure: Fishing, spend time outside     Driving: has not driven since April 2022.    Past Medical History/Physical Systems Review:     Past Medical History:  Phong Fofana  has a past medical history of Abnormal gait, Anxious depression, Back pain, Bipolar disorder, unspecified, Contracture of hand, Depression, Migraines, Neck pain, Nonverbal, Photosensitivity, Schizophrenia, unspecified, and  Seizures.    Past Surgical History:  Phong Fofana  has a past surgical history that includes Tympanostomy tube placement; Wrist surgery (Left); Spine surgery; Fusion of cervical spine by posterior approach (N/A, 1/17/2023); and Cervical laminectomy with spinal fusion (N/A, 1/17/2023).    Current Medications:  Phong has a current medication list which includes the following prescription(s): baclofen, cefadroxil, gabapentin, levetiracetam, endocet, oxycodone-acetaminophen, and quetiapine.    Allergies:  Review of patient's allergies indicates:  No Known Allergies     Objective     Cognitive Exam:  Oriented: Person, Place, Time, and Situation  Behaviors: normal, cooperative  Follows Commands/attention: Follows multistep  commands  Communication: expressive aphasia  Memory: no concerns   Safety awareness/insight to disability: aware of diagnosis, treatment, and prognosis  Coping skills/emotional control: Appropriate to situation    Visual/Perceptual: light sensitively, no changes otherwise     Physical Exam:  Postural examination/scapula alignment: Slouched posture  Joint integrity: Tight tendons in left hand PIP's and thumb MCP/ IP, and right thumb MCP/ IP and little finger PIP. Hyper extension of left 4th and 5th DIP   Skin integrity: Visible skin intact  Edema: no concerns      Joint Evaluation  AROM  2/28/2023 AROM  2/28/2023    Left Right   Shoulder flex 0-180 DEFER D/T PRECAUTIONS  DEFER D/T PRECAUTIONS   Shoulder Abd 0-180 DEFER D/T PRECAUTIONS  DEFER D/T PRECAUTIONS    Shoulder ER 0-90 WNL WNL   Shoulder IR 0-90 WNL WNL   Shoulder Extension 0-80 WNL WNL   Elbow flex/ext 0-150 WNL WNL   Wrist flex 0-80 WNL WNL   Wrist ext 0-70 WNL WNL   Supination 0-80 WNL WNL   Pronation 0-80 WNL WNL      Right thumb 100* IP / 126* MCP    Left thumb 122* IP / 114* MCP  2nd digit PIP 96 /   3rd digit  /   4th digit PIP 82 /  DIP hyper extended   5th digit PIP 86 /  DIP hyper extended     Fist:  thumb tucked bilaterally     Strength 2/28/2023 2/28/2023   **within available ROM** Left Right   Shoulder flex DEFER D/T PRECAUTIONS  DEFER D/T PRECAUTIONS    Shoulder abd DEFER D/T PRECAUTIONS  DEFER D/T PRECAUTIONS    Shoulder ER 4+/5 4+/5   Shoulder IR 4+/5 4+/5   Shoulder Extension 4+/5 4+/5   Elbow flex 4+/5 4+/5   Elbow ext 4+/5 4+/5   Wrist flex 4+/5 4+/5   Wrist ext 4+/5 4+/5   Supination 4+/5 4+/5   Pronation 4+/5 4+/5     Hand Strength (DREW Dynamometer, Setting II)   (lbs) Left  2/28/2023  Right  2/28/2023    1 14 60   2 15 56   3 11 45   avg 13.3 53.6   [norms for men aged 30-34: R=121.8 +/-22.4; L=110.4 +/-21.7 (Radhawetz et al, 1985)]    Pinch Left  2/28/2023  Right  2/28/2023    Lateral 5  16   Tip (2 point) 4 13   3 jaw dennis 8 15   Fingers not positioned correctly due to thumb contracture     Gross motor coordination:     GISSELLE (Rapid Alternating Movements): intact  Finger to Nose (5 times): intact   Finger Flicks (coordination moving from digit flexion to digit extension): impaired     Box and Block Assessment Left Right   2/28/2023 22 47   [norms for men aged 30-34: R=81.9 +/-9.0; L=81.3 +/-8.1 (Radhawetz et al, 1985)]    Fine motor coordination:   -   Thumb to Finger Opposition: intact right hand, impaired left hand      9 Hole Peg Test (9HPT) Left Right   2/28/2023 50 s  34s   [norms for men aged 31-35: R=17.54s +/-2.7s; L=18.47s +/-2.94s (Penny et al, 2003)]     Tone:  Modified Radha Scale:   0 - No increase in muscle tone    Sensation:  Justiner  reports tingling in hands and feet, numbness in left foot and in back that radiates to left shoulder  Light touch: bilateral intact  Sharp/Dull: bilateral intact  Temperature: bilateral intact    Balance:   Static Sit - GOOD+: Takes MAXIMAL challenges from all directions.    Dynamic sit- GOOD+: Takes MAXIMAL challenges from all directions.    Static Stand - GOOD: Takes MODERATE challenges from all directions  Dynamic stand - GOOD: Takes  MODERATE challenges from all directions    Endurance Deficit: moderate     Functional Status      Functional Mobility:  Bed mobility: Mod I  Roll to left: Mod I  Roll to right: Mod I  Supine to sit: Mod I  Sit to supine: Mod I  Transfers to bed: Mod I  Transfers to toilet: Mod I  Car transfers: Mod I    ADL's:  Feeding: Max A  - cutting own food   Grooming: Mod I  Hygiene: Mod I  UB Dressing: pull over clothes (mod I) buttons and zippers  (mod A)   LB Dressing: able to get clothes on, difficulty with buttoning and tying   Toileting: Mod I  Bathing: Mod I    IADL's:  Homecare: Min A - dishes  Cooking: does not complete   Laundry: unable to put clothes in dryer (front )   Yard work: can push manual mower, wants to use riding mower   Use of telephone: Mod I  Medication management: Max A (cannot get the bottles open)   Handwriting: can print his name   Technology Use:Mod I    Treatment     Total Treatment time separate from Evaluation time:0 minutes (Time did not allow for treatment)     Home Exercises and Patient Education Provided: keeping left hand dry and clean to prevent skin break down     Education provided:   -role of OT, goals for OT, scheduling/cancellations, insurance limitations with patient.    Written Home Exercises Provided: not given today   Assessment     Phong Fofana is a 33 y.o. male referred to outpatient occupational therapy and presents with a medical diagnosis of Z98.890 (ICD-10-CM) - History of cervical spinal surgery, resulting in decreased range of motion (contractures of both hands), decreased muscle strength, and impaired function and demonstrates limitations as described in the chart below. Following medical record review it is determined that patient will benefit from occupational therapy services in order to maximize pain free and/or functional use of bilateral upper extremities during ADL, IADL, and leisure tasks.    Patient prognosis is Good due to family support and  motivation   Patient will benefit from skilled outpatient Occupational Therapy to address the deficits stated above and in the chart below, provide patient/family education, and to maximize patient's level of independence.     Plan of care discussed with patient: Yes  Patient's spiritual, cultural and educational needs considered and patient is agreeable to the plan of care and goals as stated below:     Anticipated Barriers for therapy: communication; spinal precautions     Medical Necessity is demonstrated by the following  Profile and History Assessment of Occupational Performance Level of Clinical Decision Making Complexity Score   Occupational Profile:   Phong Fofana is a 33 y.o. male who lives with their family and is currently unemployed Phong Fofana has difficulty with  Cervical range of motion, upper extremity range of motion,  strength, pinch strength, coordination, contracture of bilateral hands, and pain  affecting his/her daily functional abilities. His main goal for therapy is to gain use of hands.     Comorbidities:    has a past medical history of Abnormal gait, Anxious depression, Back pain, Bipolar disorder, unspecified, Contracture of hand, Depression, Migraines, Neck pain, Nonverbal, Photosensitivity, Schizophrenia, unspecified, and Seizures.     Medical and Therapy History Review:   Expanded   Performance Deficits    Physical:  Joint Mobility  Joint Stability  Muscle Power/Strength  Muscle Endurance   Strength  Pinch Strength  Gross Motor Coordination  Fine Motor Coordination  Pain    Cognitive:  No Deficits    Psychosocial:    Social Interaction  Habits  Routines  Rituals  Group Participation     Clinical Decision Making:  moderate    Assessment Process:  Problem-Focused Assessments    Modification/Need for Assistance:  Not Necessary    Intervention Selection:  Multiple Treatment Options       low  Based on PMHX, co morbidities , data from assessments and functional level of  assistance required with task and clinical presentation directly impacting function.       The following goals were discussed with the patient and patient is in agreement with them as to be addressed in the treatment plan.     Goals:  Short Term Goals: 6 weeks   1) Pt will be (I) with initial HEP   2) Pt will be (I) with manual stretching program to increase functional use of bilateral hands  3) Pt will gain 4 lbs of  strength bilaterally for increased participation during functional tasks   4) Pt will be able to open a medication bottle with Mod I 5/5 attempts   5) Cervical range of motion to be assessed once precautions lifted and goal(s) to be added as necessary  6) Pt will complete 10 minutes on the ergometer, standing, level 3.0 for increased standing/activity tolerance (once precautions are lifted)   7). Pt will wash and dry 10 cups/dishes with mod I.     Long Term Goals: 12 weeks   1) Mod I - cutting food   2) Mod I - buttons and fasteners   3) Pt will be able to tie his shoes, Mod I, in a timely manner.   4) Pt will increase fine motor coordination, as evidenced by a decrease of 12 seconds bilaterally during 9 Hole Peg test   5) Pt will increase gross motor coordination, as evidenced by an increase of 10 blocks bilaterally during Box and Block.   6) Pt will self report increased ability to turn steering wheel on riding  to successfully cut the grass in one location at his residence (front or back yard).     Additional functional goals to be added as pt progresses and per his priorities.     Plan   Certification Period/Plan of care expiration: 2/28/2023 to 5/26/2023.    Outpatient Occupational Therapy 2 times weekly for 12 weeks to include the following interventions: Manual Therapy, Neuromuscular Re-ed, Orthotic Management and Training, Patient Education, Self Care, Therapeutic Activities, and Therapeutic Exercise.    Additional recommendations:  It is recommended that Garo have an orthopedic  consult for contractures of bilateral hands due to severity and to determine if surgical intervention would be appropriate for management (OT neuro will continue to treat conservatively).     Fernanda Capellan MS, OTR/L     I certify the need for these services furnished under this plan of treatment and while under my care.  ____________________________________ Physician/Referring Practitioner   Date of Signature

## 2023-02-28 NOTE — PROGRESS NOTES
Occupational Therapy   Evaluation    Phong SALAMANCA [place]   MRN: [id number]     Occupational therapy evaluation complete. Please see attached plan of care for details. Thank you for consult!    Fernanda Capellan MS, OTR/L   2/28/2023

## 2023-03-01 ENCOUNTER — DOCUMENTATION ONLY (OUTPATIENT)
Dept: REHABILITATION | Facility: HOSPITAL | Age: 33
End: 2023-03-01
Payer: MEDICAID

## 2023-03-01 NOTE — PROGRESS NOTES
PT/PTA met face to face to discuss pt's treatment plan and progress towards established goals. Pt will be seen by a physical therapist minimally every 6th visit or every 30 days.    Dorothea Carver PTA

## 2023-03-03 ENCOUNTER — CLINICAL SUPPORT (OUTPATIENT)
Dept: REHABILITATION | Facility: HOSPITAL | Age: 33
End: 2023-03-03
Payer: MEDICAID

## 2023-03-03 DIAGNOSIS — Z98.1 S/P CERVICAL SPINAL FUSION: ICD-10-CM

## 2023-03-03 DIAGNOSIS — R26.89 ABNORMALITY OF GAIT DUE TO IMPAIRMENT OF BALANCE: Primary | ICD-10-CM

## 2023-03-03 PROCEDURE — 97110 THERAPEUTIC EXERCISES: CPT | Mod: PN,CQ

## 2023-03-03 NOTE — PROGRESS NOTES
[facility]   Physical Therapy Treatment Note     Name: Phong Fofana  [facility] Number: [id number]    Therapy Diagnosis:   Encounter Diagnoses   Name Primary?    Abnormality of gait due to impairment of balance Yes    S/P cervical spinal fusion      Physician: Tobi Levin DO    Visit Date: 3/3/2023    Physician Orders: PT Eval and Treat   Medical Diagnosis from Referral:   H/O cervical spine surgery  - Primary     Z98.890      Evaluation Date: 2/13/2023  Authorization Period Expiration: 2/9/2023 - 2/9/3034  Plan of Care Expiration: 4/10/2023  Visit # / Visits authorized: 5/20 (6)    PTA Visit #: 2/5     Time In: 0930  Time Out: 1015  Total Billable Time: 45 minutes    Precautions: Standard, Fall, and Post-op cervical fusion  Surgery date: 1/17/23 (Post-op week 5); Per patient's dad, MD orders are as follows: wear brace to sleep and to eat, try to take brace off when he's sitting up, maybe off a little when he's walking.  Per [facility] Protocol weeks 0-8:   1. Prevent excessive initial mobility or stress on tissues  2. Limit overhead arm movements, bending and lifting.  3. Please follow physician recommendations regarding use of collars etc. (multilevel fusions hard collar for 6 wks; one-level fusions wear a collar as needed for a week or two)  No prone, no bridging, no lying with a pillow, no active neck movement for a few weeks.     SUBJECTIVE     Pt reports: patient indicating some neck pain  He was compliant with home exercise program  Response to previous treatment: no problems stated  Functional change: ongoing    Pain: 3/10  Location: neck      OBJECTIVE     Objective Measures updated at progress report unless specified.     Treatment     Garo received the treatments listed below:      Garo received therapeutic exercises to develop strength, endurance, range of motion, and flexibility for 25 minutes including:    Recumbent bike Level 4.5 20 minutes (perceived  exertion 13-somewhat hard)    Seated:  Hamstring stretch with foot on stool performing dorsiflexion 1 minute Right Left     Wall slides with Theraball 10 times with 3 second hold, 10 times with 1 second hold, verbal cues for proper form    neuromuscular re-education activities to improve: Balance, Coordination,  Proprioception, and Posture for 20 minutes. The following activities were included:    Parallel bars:  Tandem weight shift on foam cushion 2 minutes: anterior/posterior, lateral Right Left without upper extremity support    Floor ladder: x1  Side step Left Right leading x1  Forward step to, step out Right Left, step to x4    Gait 120 and 150 feet with verbal cues to increase step length    Patient Education and Home Exercises     Home Exercises Provided and Patient Education Provided     Education provided:   - Patient provided with verbal and demonstrative instruction for all activities performed in today's session.    Written Home Exercises Provided: Patient instructed to cont prior HEP.  See EMR under Patient Instructions for exercises provided during therapy sessions.    ASSESSMENT     Garo provided good participation and effort during today's session with treatment focused on lower extremity strengthening/endurance and balance activities. Garo reported fatigue with recumbent bike as well as with wall squats, recovered with sit rest.    Garo Is progressing towards his goals.   Pt prognosis is Excellent.     Pt will continue to benefit from skilled outpatient physical therapy to address the deficits listed in the problem list box on initial evaluation, provide pt/family education and to maximize pt's level of independence in the home and community environment.     Pt's spiritual, cultural and educational needs considered and pt agreeable to plan of care and goals.     Anticipated barriers to physical therapy: None    Goals:   Short Term Goals: 4 weeks   Garo will report less difficulty with moderate  activities like moving a table or pushing a vacuum on her FOTO to show improvements with his function. Ongoing.  Garo will perform 6 Minute walk test to assess ambulatory endurance and establish appropriate goals. Met  Garo will perform Functional Gait Assessment to assess dynamic balance and fall risk with walking in the home and community. Met  Garo will report being able to tolerate 15 minutes of exercise and cardiovascular activity without increases in his neck pain. Met     Long Term Goals: 8 weeks   Garo will improve is FOTO limitation score to 28% to show improvements in his overall functional mobility. Ongoing  Garo will report and demonstrate understanding of proper body mechanics with squatting and lifting activities. Ongoing  Garo will tolerate active cervical range of motion to 25-50% of total range of motion in all planes. Ongoing  Garo will be able to maintain a chin tuck for 10 seconds without increases in pain. Ongoing  Garo will report being able to tolerate 30 minutes of exercise and cardiovascular activity without increases in his neck pain. progressing  Garo will improve his distance on the 6MWT to 1616 to show improvements in his ambulatory endurance. (MDC for spinal cord injury = 45.8 m or ~150 feet). Established  Garo will improve his score on the FGA to 18/30 to show improvements in his dynamic balance with gait activities and his fall risk. Established    PLAN     Plan of care Certification: 2/13/2023 to 4/10/2023.     Outpatient Physical Therapy 2 times weekly for 8 weeks to include the following interventions: Cervical/Lumbar Traction, Electrical Stimulation, Gait Training, Manual Therapy, Moist Heat/ Ice, Neuromuscular Re-ed, Orthotic Management and Training, Patient Education, Therapeutic Activities, and Therapeutic Exercise.     Dorothea Carver, PTA

## 2023-03-07 ENCOUNTER — CLINICAL SUPPORT (OUTPATIENT)
Dept: REHABILITATION | Facility: HOSPITAL | Age: 33
End: 2023-03-07
Payer: MEDICAID

## 2023-03-07 DIAGNOSIS — R53.1 DECREASED STRENGTH: ICD-10-CM

## 2023-03-07 DIAGNOSIS — Z98.1 S/P CERVICAL SPINAL FUSION: ICD-10-CM

## 2023-03-07 DIAGNOSIS — R27.8 DECREASED COORDINATION: ICD-10-CM

## 2023-03-07 DIAGNOSIS — M25.60 DECREASED RANGE OF MOTION: Primary | ICD-10-CM

## 2023-03-07 DIAGNOSIS — M24.549 CONTRACTURE OF HAND: ICD-10-CM

## 2023-03-07 DIAGNOSIS — R26.89 ABNORMALITY OF GAIT DUE TO IMPAIRMENT OF BALANCE: Primary | ICD-10-CM

## 2023-03-07 PROCEDURE — 97110 THERAPEUTIC EXERCISES: CPT | Mod: PN

## 2023-03-07 PROCEDURE — 97530 THERAPEUTIC ACTIVITIES: CPT | Mod: PN

## 2023-03-07 NOTE — PROGRESS NOTES
[facility]   Physical Therapy Treatment Note     Name: Phong Fofana  [facility] Number: [id number]    Therapy Diagnosis:   Encounter Diagnoses   Name Primary?    Abnormality of gait due to impairment of balance Yes    S/P cervical spinal fusion        Physician: Tobi Levin DO    Visit Date: 3/7/2023    Physician Orders: PT Eval and Treat   Medical Diagnosis from Referral:   H/O cervical spine surgery  - Primary     Z98.890      Evaluation Date: 2/13/2023  Authorization Period Expiration: 2/9/2023 - 2/9/3034  Plan of Care Expiration: 4/10/2023  Visit # / Visits authorized: 6/20 (7)    PTA Visit #: 0/5     Time In: 0848  Time Out: 0928  Total Billable Time: 40 minutes    Precautions: Standard, Fall, and Post-op cervical fusion  Surgery date: 1/17/23 (Post-op week 5); Per patient's dad, MD orders are as follows: wear brace to sleep and to eat, try to take brace off when he's sitting up, maybe off a little when he's walking.  Per [place] [facility] Protocol weeks 0-8:   1. Prevent excessive initial mobility or stress on tissues  2. Limit overhead arm movements, bending and lifting.  3. Please follow physician recommendations regarding use of collars etc. (multilevel fusions hard collar for 6 wks; one-level fusions wear a collar as needed for a week or two)  No prone, no bridging, no lying with a pillow, no active neck movement for a few weeks.     SUBJECTIVE     Pt reports: Patient agreeable for therapy this morning with head nod.   He was compliant with home exercise program  Response to previous treatment: no problems stated  Functional change: ongoing    Pain: 3/10  Location: neck      OBJECTIVE     Objective Measures updated at progress report unless specified.     Treatment     Garo received the treatments listed below:      Garo received therapeutic exercises to develop strength, endurance, range of motion, and flexibility for 25 minutes including:    Recumbent bike Level 4  x 20 minutes (perceived exertion 8/20 upon completion)  Seated hamstring stretch with foot on stool, 3 x 30 seconds bilaterally      neuromuscular re-education activities to improve: Balance, Coordination,  Proprioception, and Posture for 15 minutes. The following activities were included:     Resisted side stepping at cable station (2 plates), 4 steps x 5 each left/right  Forward stepping over 5 ( 6 inch hurdles) x 4 trials   Side stepping over 5 ( 6 inch hurdles) x 2 trials each direction (left/right)  Modified single limb stance while rolling ball forward/backward with contralateral foot, x 10 repetitions each lower extremity    Activity progressed to have patient roll ball around cone placed on floor anterior to him (required greater single limb stance control)      Patient Education and Home Exercises     Home Exercises Provided and Patient Education Provided     Education provided:   - Patient provided with verbal and demonstrative instruction for all activities performed in today's session.    Written Home Exercises Provided: Patient instructed to cont prior HEP.  See EMR under Patient Instructions for exercises provided during therapy sessions.    ASSESSMENT     Garo provided good participation and effort during today's session with treatment focused on lower extremity strengthening/endurance, dynamic gait and balance activities. He maintained consistent effort and speed with recumbent cycling, but did report slight increase in neck pain with activity. Patient demonstrated initial instability with forward stepping over hurdles, but improved with continued repetition. Little difficulty observed with single limb stance activity. Patient was fatigued with activities, but participated with only brief rest breaks.     Garo Is progressing towards his goals.   Pt prognosis is Excellent.     Pt will continue to benefit from skilled outpatient physical therapy to address the deficits listed in the problem list box  on initial evaluation, provide pt/family education and to maximize pt's level of independence in the home and community environment.     Pt's spiritual, cultural and educational needs considered and pt agreeable to plan of care and goals.     Anticipated barriers to physical therapy: None    Goals:   Short Term Goals: 4 weeks   Garo will report less difficulty with moderate activities like moving a table or pushing a vacuum on her FOTO to show improvements with his function. Ongoing.  Garo will perform 6 Minute walk test to assess ambulatory endurance and establish appropriate goals. Met  Garo will perform Functional Gait Assessment to assess dynamic balance and fall risk with walking in the home and community. Met  Garo will report being able to tolerate 15 minutes of exercise and cardiovascular activity without increases in his neck pain. Met     Long Term Goals: 8 weeks   Garo will improve is FOTO limitation score to 28% to show improvements in his overall functional mobility. Ongoing  Garo will report and demonstrate understanding of proper body mechanics with squatting and lifting activities. Ongoing  Garo will tolerate active cervical range of motion to 25-50% of total range of motion in all planes. Ongoing  Garo will be able to maintain a chin tuck for 10 seconds without increases in pain. Ongoing  Garo will report being able to tolerate 30 minutes of exercise and cardiovascular activity without increases in his neck pain. progressing  Garo will improve his distance on the 6MWT to 1616 to show improvements in his ambulatory endurance. (MDC for spinal cord injury = 45.8 m or ~150 feet). Established  Garo will improve his score on the FGA to 18/30 to show improvements in his dynamic balance with gait activities and his fall risk. Established    PLAN     Plan of care Certification: 2/13/2023 to 4/10/2023.     Outpatient Physical Therapy 2 times weekly for 8 weeks to include the following  interventions: Cervical/Lumbar Traction, Electrical Stimulation, Gait Training, Manual Therapy, Moist Heat/ Ice, Neuromuscular Re-ed, Orthotic Management and Training, Patient Education, Therapeutic Activities, and Therapeutic Exercise.     BARBI QUINTANA, PT

## 2023-03-07 NOTE — PROGRESS NOTES
[facility]  Occupational Therapy Treatment Note    Date: 3/7/2023  Name: Phong Fofana  Clinic Number: [id number]    Therapy Diagnosis:   Encounter Diagnoses   Name Primary?    Decreased range of motion Yes    Decreased coordination     Decreased strength     Contracture of hand      Physician: Tobi Levin,     Physician Orders: OT eval and treat   Medical Diagnosis:   Z98.890 (ICD-10-CM) - History of cervical spinal surgery  Evaluation Date: 2/28/2023  Progress Notes Due: 3/31/23, 4/28/23  Plan of Care Expiration Period: 5/26/2023   Insurance Authorization period Expiration: 12/31/2023  Date of Return to MD: 3/20/2023 / Dr. Levin neurosurgery   Visit # / Visits Authorized: 1 / 40 (+eval)  FOTO: to be assessed       Time In: 10:30 AM   Time Out: 11:15 AM  Total Billable (one on one) Time: 45 minutes     Precautions: Standard, Fall, Seizure, and Post-op cervical fusion  Post-op week 3; Per patient's dad, MD orders are as follows: wear brace to sleep and to eat, try to take brace off when he's sitting up, maybe off a little when he's walking.  Per [place] [facility] Protocol weeks 0-8:   1. Prevent excessive initial mobility or stress on tissues  2. Limit overhead arm movements, bending and lifting.  3. Please follow physician recommendations regarding use of collars etc. (multilevel fusions hard  collar for 6 wks; one-level fusions wear a collar as needed for a week or two)  No prone, no bridging, no lying with a pillow, no active neck movement for a few weeks.     SUBJECTIVE     Pt reports: no concerns except that he only got 5 hours of sleep. He says his mom will help with HEP.   He was compliant with home exercise program given last session (none given)  Response to previous treatment: no concerns from eval  Functional change:   no significant change compared to previous session per pt report     Pain: 0/10 at rest; increased pain with stretching/ exercises, but not  quantified  Location:  fingers     OBJECTIVE     Objective Measures updated at progress report unless specified.    Treatment     Garo received the treatments listed below:     Therapeutic activities to improve functional performance for 40 minutes, including:    Gentle mobilization of joints involving bilateral UE phalanges, metacarpals, carpals, radius and ulna to facilitate increases in passive movement. Gentle stretching of the soft tissues of the hands to decrease edema and improve PROM, potentially allowing for increases in active movement.     OT fabricated custom left upper extremity orthosis for night use only to provide gentle stretch using foam tubing. Tubigrip placed around hand to keep foam tube in place without active  from patient. He wore this orthosis for about 20 minutes with no concerns for pain or pressure.     Right upper extremity AROM claw fist x5 with difficulty due to decreased DIP flexion.   Right upper extremity blocked dip flexion x5 each digit (digits 2-5) and then x10 (digits 2-5) holding each x 2s.  Right upper extremity blocked thumb IP extension, x5 & x10.   Left upper extremity blocked DIP flexion (digits 2-5) x5, blocked thumb IP extension x5.  Right index finger gentle stretching (only as tolerated) for IP flexion with MCP extension x4 (limited by pain)    Patient Education and Home Exercises      Education provided:   - orthotic wear/ care  - will need help from family for blocking exercises.   - Progress towards goals     Written Home Exercises Provided: yes.  Exercises were reviewed and Garo was able to demonstrate them prior to the end of the session.  Garo demonstrated good  understanding of the HEP provided. See EMR under Patient Instructions for exercises provided during therapy sessions.       Assessment     Pt would continue to benefit from skilled OT. Garo tolerated initial orthosis well. It sit in his palm, preventing full digit flexion. Left hand with increased  movement at PIP compared to DIP. Will need to continue addressing blocking exercises for maximum potential functional return.     Garo is progressing well towards his goals and there are no updates to goals at this time. Pt prognosis is Good.     Pt will continue to benefit from skilled outpatient occupational therapy to address the deficits listed in the problem list on initial evaluation provide pt/family education and to maximize pt's level of independence in the home and community environment.     Pt's spiritual, cultural and educational needs considered and pt agreeable to plan of care and goals.    Anticipated barriers to occupational therapy:  communication; spinal precautions     Goals:  Short Term Goals: 6 weeks   1) Pt will be (I) with initial HEP (progressing 3/7/2023)   2) Pt will be (I) with manual stretching program to increase functional use of bilateral hands (progressing 3/7/2023)   3) Pt will gain 4 lbs of  strength bilaterally for increased participation during functional tasks (progressing 3/7/2023)   4) Pt will be able to open a medication bottle with Mod I 5/5 attempts (progressing 3/7/2023)   5) Cervical range of motion to be assessed once precautions lifted and goal(s) to be added as necessary (progressing 3/7/2023)   6) Pt will complete 10 minutes on the ergometer, standing, level 3.0 for increased standing/activity tolerance (once precautions are lifted)  (progressing 3/7/2023)   7). Pt will wash and dry 10 cups/dishes with mod I.  (progressing 3/7/2023)      Long Term Goals: 12 weeks   1) Mod I - cutting food (progressing 3/7/2023)   2) Mod I - buttons and fasteners (progressing 3/7/2023)   3) Pt will be able to tie his shoes, Mod I, in a timely manner. (progressing 3/7/2023)   4) Pt will increase fine motor coordination, as evidenced by a decrease of 12 seconds bilaterally during 9 Hole Peg test (progressing 3/7/2023)   5) Pt will increase gross motor coordination, as evidenced by an  increase of 10 blocks bilaterally during Box and Block. (progressing 3/7/2023)   6) Pt will self report increased ability to turn steering wheel on riding  to successfully cut the grass in one location at his residence (front or back yard). (progressing 3/7/2023)      Additional functional goals to be added as pt progresses and per his priorities.     PLAN     Certification Period/Plan of care expiration: 2/28/2023 to 5/26/2023.     Outpatient Occupational Therapy 2 times weekly for 12 weeks to include the following interventions: Manual Therapy, Neuromuscular Re-ed, Orthotic Management and Training, Patient Education, Self Care, Therapeutic Activities, and Therapeutic Exercise.     Updates/Grading for next session: review blocking exercises; upgrade orthosis as appropriate.     Shagufta Rucker OT

## 2023-03-09 ENCOUNTER — CLINICAL SUPPORT (OUTPATIENT)
Dept: REHABILITATION | Facility: HOSPITAL | Age: 33
End: 2023-03-09
Payer: MEDICAID

## 2023-03-09 DIAGNOSIS — R27.8 DECREASED COORDINATION: ICD-10-CM

## 2023-03-09 DIAGNOSIS — M24.549 CONTRACTURE OF HAND: ICD-10-CM

## 2023-03-09 DIAGNOSIS — R53.1 DECREASED STRENGTH: ICD-10-CM

## 2023-03-09 DIAGNOSIS — M25.60 DECREASED RANGE OF MOTION: Primary | ICD-10-CM

## 2023-03-09 DIAGNOSIS — R26.89 ABNORMALITY OF GAIT DUE TO IMPAIRMENT OF BALANCE: Primary | ICD-10-CM

## 2023-03-09 DIAGNOSIS — Z98.1 S/P CERVICAL SPINAL FUSION: ICD-10-CM

## 2023-03-09 PROCEDURE — 97530 THERAPEUTIC ACTIVITIES: CPT | Mod: PN

## 2023-03-09 PROCEDURE — 97112 NEUROMUSCULAR REEDUCATION: CPT | Mod: PN

## 2023-03-09 PROCEDURE — 97140 MANUAL THERAPY 1/> REGIONS: CPT | Mod: PN

## 2023-03-09 PROCEDURE — 97110 THERAPEUTIC EXERCISES: CPT | Mod: PN

## 2023-03-09 NOTE — PROGRESS NOTES
[facility]   Physical Therapy Treatment Note     Name: Phong Fofana  [facility] Number: [id number]    Therapy Diagnosis:   Encounter Diagnoses   Name Primary?    Abnormality of gait due to impairment of balance Yes    S/P cervical spinal fusion      Physician: Tobi Levin DO    Visit Date: 3/9/2023    Physician Orders: PT Eval and Treat   Medical Diagnosis from Referral:   H/O cervical spine surgery  - Primary     Z98.890      Evaluation Date: 2/13/2023  Authorization Period Expiration: 2/9/2023 - 2/9/3034  Plan of Care Expiration: 4/10/2023  Visit # / Visits authorized: 7/20 (8)    PTA Visit #: 0/5     Time In: 1030  Time Out: 1114  Total Billable Time: 44 minutes    Precautions: Standard, Fall, and Post-op cervical fusion  Surgery date: 1/17/23 (Post-op week 7)   Per patient's dad, MD orders are as follows: wear brace to sleep and to eat, try to take brace off when he's sitting up, maybe off a little when he's walking.  Per [place] [facility] Protocol weeks 0-8:   Prevent excessive initial mobility or stress on tissues  Limit overhead arm movements, bending and lifting.  Please follow physician recommendations regarding use of collars etc. (multilevel fusions hard collar for 6 wks; one-level fusions wear a collar as needed for a week or two)  No prone, no bridging, no lying with a pillow, no active neck movement for a few weeks.     SUBJECTIVE     Pt reports: Patient agreeable for therapy this morning with head nod.   He was compliant with home exercise program  Response to previous treatment: no problems stated  Functional change: ongoing    Pain: 3/10  Location: neck      OBJECTIVE     Objective Measures updated at progress report unless specified.     Treatment     Garo received the treatments listed below:      Manual Therapy to cervical spine to maintain mobility and range of motion for 8 minutes:  PROM cervical flexion, extension, and sidebending in pain-free range of  motion with no over pressure or stretching  AAROM cervical flexion, extension, and sidebending in pain-free range of motion with no over pressure or stretching  Reported mild increase in pain from 3/10 to 4/10 following     neuromuscular re-education activities to improve: Balance, Coordination,  Proprioception, and Posture for 21 minutes. The following activities were included:     Forward stepping over 5 (6 inch) hurdles x 2 trials   Side stepping over 5 (6 inch) hurdles x 2 trials each direction (left/right)  Tandem walking with high knees x2 laps  Single leg stance with toe taps forward, diagonal, and lateral to blaze pods set to random 3 x15 seconds each leg  Single leg stance at stairs with toe taps to blaze pods on 1st 3 steps set to random 3 x15 seconds each leg    Garo received therapeutic exercises to develop strength, endurance, range of motion, and flexibility for 15 minutes including:    SciFit recumbent stepper level 5 x 15 minutes (perceived exertion 13/20 upon completion)    Patient Education and Home Exercises     Home Exercises Provided and Patient Education Provided     Education provided:   - Patient provided with verbal and demonstrative instruction for all activities performed in today's session.    Written Home Exercises Provided: Patient instructed to cont prior HEP.  See EMR under Patient Instructions for exercises provided during therapy sessions.    ASSESSMENT     Garo provided good participation and effort during today's session with treatment focused on cardio and dynamic balance activities. He reported increased difficulty with dynamic gait activities however demonstrated good performance. He also performed well with single limb stance activities however with more difficulty on his left leg than his right. He reported a mild increase in his pain by the end of the session (from 3/10 to 4/10) however denied any other symptoms of intolerance for activity.     Garo Is progressing towards  his goals.   Pt prognosis is Excellent.     Pt will continue to benefit from skilled outpatient physical therapy to address the deficits listed in the problem list box on initial evaluation, provide pt/family education and to maximize pt's level of independence in the home and community environment.     Pt's spiritual, cultural and educational needs considered and pt agreeable to plan of care and goals.     Anticipated barriers to physical therapy: None    Goals:   Short Term Goals: 4 weeks   Garo will report less difficulty with moderate activities like moving a table or pushing a vacuum on her FOTO to show improvements with his function. Ongoing.  Garo will perform 6 Minute walk test to assess ambulatory endurance and establish appropriate goals. Met  Garo will perform Functional Gait Assessment to assess dynamic balance and fall risk with walking in the home and community. Met  Garo will report being able to tolerate 15 minutes of exercise and cardiovascular activity without increases in his neck pain. Met     Long Term Goals: 8 weeks   Garo will improve is FOTO limitation score to 28% to show improvements in his overall functional mobility. Ongoing  Garo will report and demonstrate understanding of proper body mechanics with squatting and lifting activities. Ongoing  Garo will tolerate active cervical range of motion to 25-50% of total range of motion in all planes. Ongoing  Garo will be able to maintain a chin tuck for 10 seconds without increases in pain. Ongoing  Garo will report being able to tolerate 30 minutes of exercise and cardiovascular activity without increases in his neck pain. progressing  Garo will improve his distance on the 6MWT to 1616 to show improvements in his ambulatory endurance. (MDC for spinal cord injury = 45.8 m or ~150 feet). ongoing  Garo will improve his score on the FGA to 18/30 to show improvements in his dynamic balance with gait activities and his fall risk.  ongoing    PLAN     Plan of care Certification: 2/13/2023 to 4/10/2023.     Outpatient Physical Therapy 2 times weekly for 8 weeks to include the following interventions: Cervical/Lumbar Traction, Electrical Stimulation, Gait Training, Manual Therapy, Moist Heat/ Ice, Neuromuscular Re-ed, Orthotic Management and Training, Patient Education, Therapeutic Activities, and Therapeutic Exercise.     Recommendations for next session: dynamic balance/gait activities, cardio, cervical PROM    Yanna Wiggins, PT

## 2023-03-09 NOTE — PROGRESS NOTES
[facility]  Occupational Therapy Treatment Note    Date: 3/9/2023  Name: Phong Fofana  Clinic Number: [id number]    Therapy Diagnosis:   Encounter Diagnoses   Name Primary?    Decreased range of motion Yes    Decreased coordination     Decreased strength     Contracture of hand      Physician: Tobi Levin DO    Physician Orders: OT eval and treat   Medical Diagnosis:   Z98.890 (ICD-10-CM) - History of cervical spinal surgery  Evaluation Date: 2/28/2023  Progress Notes Due: 3/31/23, 4/28/23  Plan of Care Expiration Period: 5/26/2023   Insurance Authorization period Expiration: 12/31/2023  Date of Return to MD: 3/20/2023 / Dr. Levin neurosurgery   Visit # / Visits Authorized: 2 / 40 (+eval)  FOTO: to be assessed       Time In: 11:15 AM   Time Out: 12:00 PM  Total Billable (one on one) Time: 45 minutes     Precautions: Standard, Fall, Seizure, and Post-op cervical fusion  Post-op week 3; Per patient's dad, MD orders are as follows: wear brace to sleep and to eat, try to take brace off when he's sitting up, maybe off a little when he's walking.  Per [place] [facility] Protocol weeks 0-8:   1. Prevent excessive initial mobility or stress on tissues  2. Limit overhead arm movements, bending and lifting.  3. Please follow physician recommendations regarding use of collars etc. (multilevel fusions hard  collar for 6 wks; one-level fusions wear a collar as needed for a week or two)  No prone, no bridging, no lying with a pillow, no active neck movement for a few weeks.     SUBJECTIVE     Pt reports: Pt reports his orthosis is working well. He reports that he has been completing exercises given. Garo communicated     He was compliant with home exercise program given last session .  Response to previous treatment: no concerns from eval  Functional change:   no significant change compared to previous session per pt report     Pain: 0/10 at rest; increased pain with stretching/  exercises, but not quantified  Location:  fingers     OBJECTIVE     Objective Measures updated at progress report unless specified.    Treatment     Garo received the treatments listed below:     Therapeutic activities to improve functional performance for 45 minutes, including:    Gentle mobilization of joints involving bilateral UE phalanges, metacarpals, carpals, radius and ulna to facilitate increases in passive movement. Gentle stretching of the soft tissues of the hands to decrease edema and improve PROM, potentially allowing for increases in active movement.     Right upper extremity AROM claw fist x10 with difficulty due to decreased DIP flexion.     Right upper extremity blocked DIP flexion x10 each digit (digits 2-5) holding each x 3s.    Right upper extremity blocked PIP flexion x10 each digit (digits 2-5) holding each x 3s.    Right upper extremity blocked MCP flexion x10 each digit (digits 2-5) holding each x 3s.    Right upper extremity blocked thumb IP extension x10.     Left upper extremity blocked DIP flexion (digits 2-5) x10, blocked thumb IP extension x10 .    Pt participated in putty cutting activity. A built up handled fork and knife were placed in both palms with grasp of digits. Both thumbs had to be placed around the handle, but was tolerated well. He was able to roll and flatten the putty and then cut strips. He required mod verbal cues for upper extremity positioning.     Patient Education and Home Exercises      Education provided:   - orthotic wear/ care  - will need help from family for blocking exercises.   - Progress towards goals     Written Home Exercises Provided: yes.  Exercises were reviewed and Garo was able to demonstrate them prior to the end of the session. Garo demonstrated good  understanding of the HEP provided. See EMR under Patient Instructions for exercises provided during therapy sessions.       Assessment     Pt would continue to benefit from skilled OT. Continued  active range of motion blocking exercises on both hands with good understanding of how to complete. Hyperextension of DIP's observed on left hand. He completed the cutting activity successfully with increased time and verbal cues for positioning. Will need to continue addressing blocking exercises and stretches for maximum potential functional return.     Garo is progressing well towards his goals and there are no updates to goals at this time. Pt prognosis is Good.     Pt will continue to benefit from skilled outpatient occupational therapy to address the deficits listed in the problem list on initial evaluation provide pt/family education and to maximize pt's level of independence in the home and community environment.     Pt's spiritual, cultural and educational needs considered and pt agreeable to plan of care and goals.    Anticipated barriers to occupational therapy:  communication; spinal precautions     Goals:  Short Term Goals: 6 weeks   1) Pt will be (I) with initial HEP (progressing 3/9/2023)   2) Pt will be (I) with manual stretching program to increase functional use of bilateral hands (progressing 3/9/2023)   3) Pt will gain 4 lbs of  strength bilaterally for increased participation during functional tasks (progressing 3/9/2023)   4) Pt will be able to open a medication bottle with Mod I 5/5 attempts (progressing 3/9/2023)   5) Cervical range of motion to be assessed once precautions lifted and goal(s) to be added as necessary (progressing 3/9/2023)   6) Pt will complete 10 minutes on the ergometer, standing, level 3.0 for increased standing/activity tolerance (once precautions are lifted)  (progressing 3/9/2023)   7). Pt will wash and dry 10 cups/dishes with mod I.  (progressing 3/9/2023)      Long Term Goals: 12 weeks   1) Mod I - cutting food (progressing 3/9/2023)   2) Mod I - buttons and fasteners (progressing 3/9/2023)   3) Pt will be able to tie his shoes, Mod I, in a timely manner.  (progressing 3/9/2023)   4) Pt will increase fine motor coordination, as evidenced by a decrease of 12 seconds bilaterally during 9 Hole Peg test (progressing 3/9/2023)   5) Pt will increase gross motor coordination, as evidenced by an increase of 10 blocks bilaterally during Box and Block. (progressing 3/9/2023)   6) Pt will self report increased ability to turn steering wheel on riding  to successfully cut the grass in one location at his residence (front or back yard). (progressing 3/9/2023)      Additional functional goals to be added as pt progresses and per his priorities.     PLAN     Certification Period/Plan of care expiration: 2/28/2023 to 5/26/2023.     Outpatient Occupational Therapy 2 times weekly for 12 weeks to include the following interventions: Manual Therapy, Neuromuscular Re-ed, Orthotic Management and Training, Patient Education, Self Care, Therapeutic Activities, and Therapeutic Exercise.     Updates/Grading for next session: review blocking exercises; upgrade orthosis as appropriate.; ZAHRA Capellan OT

## 2023-03-10 DIAGNOSIS — Z98.1 S/P CERVICAL SPINAL FUSION: Primary | ICD-10-CM

## 2023-03-15 ENCOUNTER — CLINICAL SUPPORT (OUTPATIENT)
Dept: REHABILITATION | Facility: HOSPITAL | Age: 33
End: 2023-03-15
Payer: MEDICAID

## 2023-03-15 DIAGNOSIS — R26.89 ABNORMALITY OF GAIT DUE TO IMPAIRMENT OF BALANCE: Primary | ICD-10-CM

## 2023-03-15 DIAGNOSIS — R27.8 DECREASED COORDINATION: ICD-10-CM

## 2023-03-15 DIAGNOSIS — M24.549 CONTRACTURE OF HAND: ICD-10-CM

## 2023-03-15 DIAGNOSIS — R53.1 DECREASED STRENGTH: ICD-10-CM

## 2023-03-15 DIAGNOSIS — Z98.1 S/P CERVICAL SPINAL FUSION: ICD-10-CM

## 2023-03-15 DIAGNOSIS — M25.60 DECREASED RANGE OF MOTION: Primary | ICD-10-CM

## 2023-03-15 PROCEDURE — 97530 THERAPEUTIC ACTIVITIES: CPT | Mod: PN

## 2023-03-15 PROCEDURE — 97110 THERAPEUTIC EXERCISES: CPT | Mod: PN,CQ

## 2023-03-15 NOTE — PROGRESS NOTES
[facility]   Physical Therapy Treatment Note     Name: Phong Fofana  [facility] Number: [id number]    Therapy Diagnosis:   Encounter Diagnoses   Name Primary?    Abnormality of gait due to impairment of balance Yes    S/P cervical spinal fusion      Physician: Tobi Levin DO    Visit Date: 3/15/2023    Physician Orders: PT Eval and Treat   Medical Diagnosis from Referral:   H/O cervical spine surgery  - Primary     Z98.890      Evaluation Date: 2/13/2023  Authorization Period Expiration: 2/9/2023 - 2/9/3034  Plan of Care Expiration: 4/10/2023  Visit # / Visits authorized: 8/20 (9)    PTA Visit #: 1/5     Time In: 0930  Time Out: 1010  Total Billable Time: 40 minutes   Precautions: Standard, Fall, and Post-op cervical fusion  Surgery date: 1/17/23 (Post-op week 7)   Per patient's dad, MD orders are as follows: wear brace to sleep and to eat, try to take brace off when he's sitting up, maybe off a little when he's walking.  Per [facility] Protocol weeks 0-8:   Prevent excessive initial mobility or stress on tissues  Limit overhead arm movements, bending and lifting.  Please follow physician recommendations regarding use of collars etc. (multilevel fusions hard collar for 6 wks; one-level fusions wear a collar as needed for a week or two)  No prone, no bridging, no lying with a pillow, no active neck movement for a few weeks.     SUBJECTIVE     Pt reports: Occupational Therapy stating bilateral taped hands for prolonged stretching. Patient indicating he is trying to relax arms during ambulation.   He was compliant with home exercise program  Response to previous treatment: no problems stated  Functional change: ongoing    Pain: 4/10  Location: neck      OBJECTIVE     Objective Measures updated at progress report unless specified.     Treatment     Garo received the treatments listed below:      neuromuscular re-education activities to improve: Balance, Coordination,   Proprioception, and Posture for 18 minutes. The following activities were included:     Parallel Bars:  Feet together balance on foam cushion 2 minutes.   Tandem weight shift anterior posterior 2 minutes each side.   Side stepping on/over BOSU ball 2 minutes.   Tandem static standing on BOSU pads 1 minute each side     Garo received therapeutic exercises to develop strength, endurance, range of motion, and flexibility for 22 minutes including:    SciFit recumbent stepper level 5 x 15 minutes (perceived exertion 8/20 upon completion)  6 minute walk test was Not Applicable due to malfunction of foot counter.      Patient Education and Home Exercises     Home Exercises Provided and Patient Education Provided     Education provided:   - Patient provided with verbal and demonstrative instruction for all activities performed in today's session.  -Patient was encouraged to pay attention to arm swing during gait with more relaxed shoulders.     Written Home Exercises Provided: Patient instructed to cont prior HEP.  See EMR under Patient Instructions for exercises provided during therapy sessions.    ASSESSMENT     Garo provided good participation and effort during today's session with treatment focused on balance, endurance, and coordination. Gaor was able to complete all exercises without rest breaks, completed 6 minute walk test walking through gym and on uneven surfaces outside, but were unable to record distance due to faulty foot counter. Completed parallel bar exercises without any upper extremity support.     Garo Is progressing towards his goals.   Pt prognosis is Excellent.     Pt will continue to benefit from skilled outpatient physical therapy to address the deficits listed in the problem list box on initial evaluation, provide pt/family education and to maximize pt's level of independence in the home and community environment.     Pt's spiritual, cultural and educational needs considered and pt agreeable to  plan of care and goals.     Anticipated barriers to physical therapy: None    Goals:   Short Term Goals: 4 weeks   Garo will report less difficulty with moderate activities like moving a table or pushing a vacuum on her FOTO to show improvements with his function. Ongoing.  Garo will perform 6 Minute walk test to assess ambulatory endurance and establish appropriate goals. Met  Garo will perform Functional Gait Assessment to assess dynamic balance and fall risk with walking in the home and community. Met  Garo will report being able to tolerate 15 minutes of exercise and cardiovascular activity without increases in his neck pain. Met     Long Term Goals: 8 weeks   Garo will improve is FOTO limitation score to 28% to show improvements in his overall functional mobility. Ongoing  Garo will report and demonstrate understanding of proper body mechanics with squatting and lifting activities. Ongoing  Garo will tolerate active cervical range of motion to 25-50% of total range of motion in all planes. Ongoing  Garo will be able to maintain a chin tuck for 10 seconds without increases in pain. Ongoing  Garo will report being able to tolerate 30 minutes of exercise and cardiovascular activity without increases in his neck pain. progressing  Garo will improve his distance on the 6MWT to 1616 to show improvements in his ambulatory endurance. (MDC for spinal cord injury = 45.8 m or ~150 feet). ongoing  Garo will improve his score on the FGA to 18/30 to show improvements in his dynamic balance with gait activities and his fall risk. ongoing    PLAN     Plan of care Certification: 2/13/2023 to 4/10/2023.     Outpatient Physical Therapy 2 times weekly for 8 weeks to include the following interventions: Cervical/Lumbar Traction, Electrical Stimulation, Gait Training, Manual Therapy, Moist Heat/ Ice, Neuromuscular Re-ed, Orthotic Management and Training, Patient Education, Therapeutic Activities, and Therapeutic Exercise.      I certify that I was present in the room directing QUINCY Taveras  in service delivery and guiding them using my skilled judgment. As the co-signing therapist I have reviewed the students documentation and am responsible for the treatment, assessment, and plan.      Dorothea Carver, Physical Therapist Assistant

## 2023-03-15 NOTE — PROGRESS NOTES
[facility]  Occupational Therapy Treatment Note    Date: 3/15/2023  Name: Phong Fofana  Clinic Number: [id number]    Therapy Diagnosis:   Encounter Diagnoses   Name Primary?    Decreased range of motion Yes    Decreased coordination     Decreased strength     Contracture of hand      Physician: Tobi Levin DO    Physician Orders: OT eval and treat   Medical Diagnosis:   Z98.890 (ICD-10-CM) - History of cervical spinal surgery  Evaluation Date: 2/28/2023  Progress Notes Due: 3/31/23, 4/28/23  Plan of Care Expiration Period: 5/26/2023   Insurance Authorization period Expiration: 12/31/2023  Date of Return to MD: 3/20/2023 / Dr. Levin neurosurgery   Visit # / Visits Authorized: 3/ 40 (+eval)  FOTO: to be assessed       Time In: 0850  Time Out: 0930  Total Billable (one on one) Time: 40 minutes     Precautions: Standard, Fall, Seizure, and Post-op cervical fusion  Post-op week 3; Per patient's dad, MD orders are as follows: wear brace to sleep and to eat, try to take brace off when he's sitting up, maybe off a little when he's walking.  Per [place] [facility] Protocol weeks 0-8:   1. Prevent excessive initial mobility or stress on tissues  2. Limit overhead arm movements, bending and lifting.  3. Please follow physician recommendations regarding use of collars etc. (multilevel fusions hard  collar for 6 wks; one-level fusions wear a collar as needed for a week or two)  No prone, no bridging, no lying with a pillow, no active neck movement for a few weeks.     SUBJECTIVE     Pt reports: Garo indicates that he still feels a stretch with the foam in-hand orthosis provided by OT.     He was compliant with home exercise program given last session .  Response to previous treatment: no concerns from eval  Functional change:   no significant change compared to previous session per pt report     Pain: 0/10 at rest; increased pain with stretching/ exercises, but not quantified  Location:   fingers     OBJECTIVE     Objective Measures updated at progress report unless specified.    Treatment     Garo received the treatments listed below:     Therapeutic activities to improve functional performance for 40 minutes, including:    Gentle mobilization of joints involving the right UE phalanges, metacarpals, carpals, radius and ulna to facilitate increases in passive movement. Gentle stretching of the soft tissues of the right wrist and hand to decrease edema and improve PROM, potentially allowing for increases in active movement. Extent of movement limited by pain, though not quantified.   AAROM thumb IP extension 2x10. Blocked 4th & 5th digits DIP flexion x10 each.   Used partial tape roll positioning in crook of thumb to provide low-load prolonged stretch (LLPS) to thumb IP extension, secured in place to ensure no migration & continued stretching. Was able to use oval 8 orthoses on the 4th digit, size 10 at the PIP & size 6 at the DIP to allow for low-load prolonged stretch (LLPS), decreasing boutonniere deformity. Applied Oval-8 orthosis, size 11 at the 5th digit PIP to decrease PIP flexion. Did not apply orthosis to DIP.    Gentle mobilization of joints involving the left UE phalanges, metacarpals, carpals, radius and ulna to facilitate increases in passive movement. Gentle stretching of the soft tissues of the left wrist and hand to decrease edema and improve PROM, potentially allowing for increases in active movement. Applied temporary orthosis of foam in palm to allow for low-load prolonged stretch (LLPS) to PIP flexors. Small cut out was made for 5th digit to ensure biomechanically correct positioning as patient tends towards boutonierre deformity in digits 2-5. Temporary orthosis was secured to encourage increased PIP extension, but also DIP flexion for functional positioning.    Patient's temporary orthoses were kept in place while he went to physical therapy, allowing for stretching of the  tendons & joints during challenges that might otherwise result in flexor tightness. OT removed temporary orthoses after his physical therapy session without incident. No concerns with skin or pressure.    Patient Education and Home Exercises      Education provided:   - orthotic wear/ care  - will need help from family for blocking exercises.   - Progress towards goals     Written Home Exercises Provided: Patient instructed to cont prior HEP.  Exercises were reviewed and Garo was able to demonstrate them prior to the end of the session. Garo demonstrated good  understanding of the HEP provided. See EMR under Patient Instructions for exercises provided during therapy sessions.       Assessment     Pt would continue to benefit from skilled OT. Garo appears to have increased PIP extension compared to previous visits as we were able to place a larger piece of foam in his left hand compared to what was given for home use last week. Will consider updating measurements at the next session. Will need to continue addressing blocking exercises and \low-load prolonged stretch (LLPS) for maximum potential functional return.     Garo is progressing well towards his goals and there are no updates to goals at this time. Pt prognosis is Good.     Pt will continue to benefit from skilled outpatient occupational therapy to address the deficits listed in the problem list on initial evaluation provide pt/family education and to maximize pt's level of independence in the home and community environment.     Pt's spiritual, cultural and educational needs considered and pt agreeable to plan of care and goals.    Anticipated barriers to occupational therapy:  communication; spinal precautions     Goals:  Short Term Goals: 6 weeks   1) Pt will be (I) with initial HEP (progressing 3/15/2023)   2) Pt will be (I) with manual stretching program to increase functional use of bilateral hands (progressing 3/15/2023)   3) Pt will gain 4 lbs of   strength bilaterally for increased participation during functional tasks (progressing 3/15/2023)   4) Pt will be able to open a medication bottle with Mod I 5/5 attempts (progressing 3/15/2023)   5) Cervical range of motion to be assessed once precautions lifted and goal(s) to be added as necessary (progressing 3/15/2023)   6) Pt will complete 10 minutes on the ergometer, standing, level 3.0 for increased standing/activity tolerance (once precautions are lifted)  (progressing 3/15/2023)   7). Pt will wash and dry 10 cups/dishes with mod I.  (progressing 3/15/2023)      Long Term Goals: 12 weeks   1) Mod I - cutting food (progressing 3/15/2023)   2) Mod I - buttons and fasteners (progressing 3/15/2023)   3) Pt will be able to tie his shoes, Mod I, in a timely manner. (progressing 3/15/2023)   4) Pt will increase fine motor coordination, as evidenced by a decrease of 12 seconds bilaterally during 9 Hole Peg test (progressing 3/15/2023)   5) Pt will increase gross motor coordination, as evidenced by an increase of 10 blocks bilaterally during Box and Block. (progressing 3/15/2023)   6) Pt will self report increased ability to turn steering wheel on riding  to successfully cut the grass in one location at his residence (front or back yard). (progressing 3/15/2023)      Additional functional goals to be added as pt progresses and per his priorities.     PLAN     Certification Period/Plan of care expiration: 2/28/2023 to 5/26/2023.     Outpatient Occupational Therapy 2 times weekly for 12 weeks to include the following interventions: Manual Therapy, Neuromuscular Re-ed, Orthotic Management and Training, Patient Education, Self Care, Therapeutic Activities, and Therapeutic Exercise.     Updates/Grading for next session: review blocking exercises; upgrade orthosis as appropriate.; ZAHRA Rucker OT

## 2023-03-16 ENCOUNTER — CLINICAL SUPPORT (OUTPATIENT)
Dept: REHABILITATION | Facility: HOSPITAL | Age: 33
End: 2023-03-16
Payer: MEDICAID

## 2023-03-16 ENCOUNTER — CLINICAL SUPPORT (OUTPATIENT)
Dept: REHABILITATION | Facility: HOSPITAL | Age: 33
End: 2023-03-16
Attending: NEUROLOGICAL SURGERY
Payer: MEDICAID

## 2023-03-16 DIAGNOSIS — R26.89 ABNORMALITY OF GAIT DUE TO IMPAIRMENT OF BALANCE: Primary | ICD-10-CM

## 2023-03-16 DIAGNOSIS — Z98.1 S/P CERVICAL SPINAL FUSION: ICD-10-CM

## 2023-03-16 PROCEDURE — 97140 MANUAL THERAPY 1/> REGIONS: CPT | Mod: PN

## 2023-03-16 PROCEDURE — 92522 EVALUATE SPEECH PRODUCTION: CPT | Mod: PN

## 2023-03-16 PROCEDURE — 97112 NEUROMUSCULAR REEDUCATION: CPT | Mod: PN

## 2023-03-16 PROCEDURE — 97110 THERAPEUTIC EXERCISES: CPT | Mod: PN

## 2023-03-16 NOTE — PROGRESS NOTES
.[facility]  Speech Therapy Evaluation -Neurological Rehabilitation    Date: 3/16/2023     Name: Phong Fofana   MRN: [id number]    Therapy Diagnosis:   Encounter Diagnosis   Name Primary?    S/P cervical spinal fusion     Physician: Tobi Levin DO  Physician Orders: Ambulatory Referral to Speech Therapy   Medical Diagnosis: S/P cervical spinal fusion [Z98.1]    Visit # / Visits Authorized:  1 / 1   Date of Evaluation:  3/16/2023   Insurance Authorization Period: 3/10/2023 to 12/31/2023  Plan of Care Certification:    3/16/2023 to 5/12/2023      Time In:1430   Time Out: 1510   Procedure Min.   Evaluation of Speech Sound Production  40     Precautions: Standard, Fall, Communication, and Status post cervical fusion   Subjective   Date of Onset: April 2022  History of Current Condition:  Phong Fofana is a 33 y.o. male  who presents to [facility] Outpatient Speech Therapy for evaluation secondary to s/p cervical fusion. Patient was referred to therapy by Tobi Levin DO , which is the patient's neurosurgeon. The patient in nonverbal since end of April 2022.He arrived to the evaluation alone. His father came the next morning to aid in supplementing the history which was gathered through the chart review:     Per Physical Therapy eval:  In April 2022 Garo's parent's got a phone call that he was in the hospital in [place]. He was found on the side of the road after having a seizure and possible stroke. He has a history of prior seizures that was not disclosed to his family. He moved back to [place] at the end of April and gradually developed weakness and tone in both arms and foot drop in his right foot.  He has been getting medical care and established a PCP. Family still hasn't figured out why he lost his voice however he can text and is cognitively intact. He got an MRI a few weeks ago and saw changes in his cervical spine. Ultimately, he underwent surgery to  remove compression on his spinal cord. He is able to walk with less gait deviations (no more foot drop). He used to be hypersensitive (pain 15/10 with light touch to different parts of his body) but this has improved and is about 2-3/10 mostly in his neck due to his surgery. His family wants to know what's going on with his voice and wants to improve his hand function. He still has light sensitivity and flourescent lights bother him more than regular lights. He reports weakness in his hands, knees and hips but foot drop has gotten better. No falls or near falls recently. He is just now starting to get more comfortable walking around and getting back into daily activities but he is still a little nervous to take brace off when walking because he doesn't want to mess up his surgery.     After speaking with father- patient's speech was dysarthric after the seizures, but was mostly intelligible; however, this slowly declines over 3 weeks and since he has been unable to speak in 11 months.     Past Medical History: Phong Fofana  has a past medical history of Abnormal gait, Anxious depression, Back pain, Bipolar disorder, unspecified, Contracture of hand, Depression, Migraines, Neck pain, Nonverbal, Photosensitivity, Schizophrenia, unspecified, and Seizures.  Phong Fofana  has a past surgical history that includes Tympanostomy tube placement; Wrist surgery (Left); Spine surgery; Fusion of cervical spine by posterior approach (N/A, 1/17/2023); and Cervical laminectomy with spinal fusion (N/A, 1/17/2023).  Medical Hx and Allergies: Phong has a current medication list which includes the following prescription(s): baclofen, cefadroxil, gabapentin, levetiracetam, endocet, oxycodone-acetaminophen, and quetiapine. Review of patient's allergies indicates:  No Known Allergies  Prior Therapy:  no prior Speech Therapy evaluation- Physical Therapy and Occupational Therapy outpatient at [facility]   Social History:   Patient lives with parents in [place], Patient is not currently driving  Prior Level of Function: independent this time last year   Current Level of Function: unable to verbally communicate   Pain: 0/10  Pain Location / Description: no pain indicated throughout session   Nutrition:  oral diet, IDDSI 0 (Thin), and IDDSI 7 (Regular), reports some dysphagia   Patient's Therapy Goals:  increase communication   Objective   Formal Assessment:  Cranial Nerve Examination  Cranial Nerve 5: Trigeminal Nerve  Motor Jaw Posture at rest: Closed  Mandible Elevation/Depression: reduced ROM- tightness  Mandible lateralization: unable to move left   Abnormal movement: present- reported deficits left side  Interpretation: unable to rule out cranial nerve involvement    Sensory Forehead: WFL  Cheek: WFL  Jaw: WFL  Facial Pain: None noted Interpretation: no deficits noted      Cranial Nerve 7: Facial Nerve  Motor Facial Symmetry: eye droop and mask-like expression right eye   Wrinkle Forehead: WFL  Close eyes tightly: WFL  Labial Protrusion: Decreased ROM - especially left   Labial Retraction: Deviation-left side- decreased range of motion   Buccal Strength with Labial Seal: Reduced  Abnormal movement: absent Interpretation: unable to rule out cranial nerve involvement    Sensory Formal testing not completed.  Park taste on back of tongue       Cranial Nerves IX and X: Glossopharyngeal and Vagus Nerves  Motor Palatal Symmetry (Rest):  lower right   Palatal Symmetry (Movement): No elevation right  Cough: Perceptually wheezy  Voice Prior to PO intake:  unable to assess   Resonance: Hypernasal  Abnormal movement: absent Interpretation: unable to rule out cranial nerve involvement      Cranial Nerve XII: Hypoglossal Nerve  Motor Tongue at rest: WNL  Lingual Protrusion: Unable to protrude past lips  Lingual Protrusion against Resistance: Weakness  Lingual Lateralization: Inability to move left  Abnormal movement: absent Interpretation:  unable to rule out cranial nerve involvement      Other information:  Volitional Swallow: Able to palpate laryngeal rise  Mucosal Quality: Xerostomia  Secretion Management: adequate   Dentition: Good condition for speech and mastication     Voicing attempts:  attempted yes, with 0% intelligibility; however, noted voicing completed but weak  Cough was weak and wheezy, but audible     Follow up with Otolaryngology (ENT) warranted to establish function of vocal folds      AAC options discussed with the patient:  Speech assistant- patient was able to generate messages and liked the affordability of this device which we will instal next session, this would supplement what he is currently doing with audibly reading his messages which are typed.   Snap core, dialogue AAC, GRID, and Touch chat- presented to the patient as an option, however, he does not have a tablet and a designated communication device would likely be required- reassess with AAC eval if indicated       Description:  Language Skills:   Auditory Comprehension: Informally judged to be WNL. Pt was able to hold a conversation with ease and follow multi-step commands.   Verbal Expression: limited by dysarthria   Reading Comprehension: informally assessed to be WFL   Written Expression: no spelling errors while typing messages to communicate with me, normal grammar, appropriate answers     Cognition: informally assessed to be WFL     Motor Speech Skills: Severe dysarthria with currently 0% speech intelligibility. Some vocalization noted, but severely decreased articulatory kinematics. Hypernasality with nasal emissions noted on attempts to vocalize    Treatment   Total Treatment Time Separate from Evaluation: n/a   no treatment performed secondary to time to complete evaluation.    Education: Plan of Care, role of SLP in care, course of medical disease affect on therapy diagnosis , scheduling/ cancellation policy, and communication software  were discussed with  patient. Patient and family members expressed understanding.     Home Program: not yet established   Assessment     Garo presents to [facility] status post medical diagnosis of s/p cervical fusion. He presents with severe (likely Flaccid, but possibly spastic) dysarthria characterized by 0% intelligibility and no useable speech approximations within the session. He has been nonverbal for 11 months without speech intervention. Speech Therapy Plan of Care will focus on establishing a functional form of communication  Demonstrates impairments including limitations as described in the problem list. Positive prognostic factors include family support and prior level of functioning. Negative prognostic factors include time since onset. No barriers to therapy identified. Patient will benefit from skilled, outpatient neurological rehabilitation speech therapy.    Rehab Potential: good  Pt's spiritual, cultural, and educational needs considered and patient agreeable to plan of care and goals.    Short Term Goals (4 weeks):   Patient will see Otolaryngology (ENT)   Patient and Speech Language Pathologist will establish a functional form of communication for the patient.  Patient will approximate basic answers verbally with 20% speech intelligibility   4. Patient will complete a Fiberoptic Endoscopic Evaluation of the Swallow (FEES) to assess swallow function.    Long Term Goals (8 weeks):   Patient will utilize a communication software to functionally communicate independently.    Plan     Plan of Care Certification Period: 3/16/2023 to 5/12/2023    Recommended Treatment Plan:  Patient will participate in the [facility] program for speech therapy 2 times per week for 8 weeks to address his Communication and Motor Speech deficits, to educate patient and their family, and to participate in a home exercise program.    Other Recommendations:   Referral to Otolaryngology (ENT)  Fiberoptic Endoscopic  Evaluation of the Swallow (FEES)    Therapist's Name:   Gina Garrido CCC-SLP   3/16/2023     I CERTIFY THE NEED FOR THESE SERVICES FURNISHED UNDER THIS PLAN OF TREATMENT AND WHILE UNDER MY CARE    Physician Name: _______________________________    Physician Signature: ____________________________

## 2023-03-16 NOTE — PROGRESS NOTES
"[facility]   Physical Therapy Treatment Note     Name: Phong Fofana  Clinic Number: [id number]    Therapy Diagnosis:   Encounter Diagnoses   Name Primary?    Abnormality of gait due to impairment of balance Yes    S/P cervical spinal fusion      Physician: Tobi Levin DO    Visit Date: 3/16/2023    Physician Orders: PT Eval and Treat   Medical Diagnosis from Referral:   H/O cervical spine surgery  - Primary     Z98.890      Evaluation Date: 2/13/2023  Authorization Period Expiration: 2/9/2023 - 2/9/3034  Plan of Care Expiration: 4/10/2023  Visit # / Visits authorized: 9/20 (10)    PTA Visit #: 0/5     Time In: 1518  Time Out: 1604  Total Billable Time: 46 minutes     Precautions: Standard, Fall, and Post-op cervical fusion  Surgery date: 1/17/23 (Post-op week 8)   Per patient's dad, MD orders are as follows: wear brace to sleep and to eat, try to take brace off when he's sitting up, maybe off a little when he's walking.  Per Comfort Spine Surgeon Protocol weeks 8-12:   Avoid cervical loading (overhead arm resisted movements)  Avoid passive stretching of cervical spine    SUBJECTIVE     Pt reports: Patient indicates that he's feeling "so-so" today with hand gestures. A little tired    He was compliant with home exercise program  Response to previous treatment: no problems stated  Functional change: ongoing    Pain: 3/10  Location: neck      OBJECTIVE     Objective Measures updated at progress report unless specified.     Treatment     Garo received the treatments listed below:      Manual Therapy to cervical spine to maintain mobility and range of motion for 8 minutes:    AAROM cervical flexion, extension, and sidebending in pain-free range of motion with no over pressure or stretching  Deep cervical neck flexor isometrics with tactile cue for muscle activation 10 x 5 second hold     neuromuscular re-education activities to improve: Balance, Coordination,  Proprioception, and " Posture for 18 minutes. The following activities were included:      Overground gait training while balancing tennis ball on small end of cone x490 feet to encourage motor control when walking  Single leg stance while making circles with small ball around a cone; 2 x5 circles on each leg  Tandem walking with high knees going forward, no high knees going backward x3 laps each direction  Standing on bosu, round side down, with weight shifting forward/backward and left/right 2 x10 each direction     Garo received therapeutic exercises to develop strength, endurance, range of motion, and flexibility for 20 minutes including:     SciFit recumbent stepper level 5 x 15 minutes (perceived exertion 9/20 upon completion)  Pelvic tilts 2 x10  Modified dead bugs with legs only 2 x10    Patient Education and Home Exercises     Home Exercises Provided and Patient Education Provided     Education provided:   - Patient provided with verbal and demonstrative instruction for all activities performed in today's session.  -Patient was encouraged to pay attention to arm swing during gait with more relaxed shoulders.     Written Home Exercises Provided: Patient instructed to cont prior HEP.  See EMR under Patient Instructions for exercises provided during therapy sessions.    ASSESSMENT     Grao provided good participation and effort during today's session with treatment focused on balance, endurance, and cervical mobility/stability. He was able to participate in the duration of the session without rest breaks with no reported increase in his pain. He showed good performance of all activities and expressed understanding of his new precautions when out of his brace at home.     Garo Is progressing towards his goals.   Pt prognosis is Excellent.     Pt will continue to benefit from skilled outpatient physical therapy to address the deficits listed in the problem list box on initial evaluation, provide pt/family education and to maximize  pt's level of independence in the home and community environment.     Pt's spiritual, cultural and educational needs considered and pt agreeable to plan of care and goals.     Anticipated barriers to physical therapy: None    Goals:   Short Term Goals: 4 weeks   Garo will report less difficulty with moderate activities like moving a table or pushing a vacuum on her FOTO to show improvements with his function. Ongoing.  Garo will perform 6 Minute walk test to assess ambulatory endurance and establish appropriate goals. Met  Garo will perform Functional Gait Assessment to assess dynamic balance and fall risk with walking in the home and community. Met  Garo will report being able to tolerate 15 minutes of exercise and cardiovascular activity without increases in his neck pain. Met     Long Term Goals: 8 weeks   Garo will improve is FOTO limitation score to 28% to show improvements in his overall functional mobility. Ongoing  Garo will report and demonstrate understanding of proper body mechanics with squatting and lifting activities. Ongoing  Garo will tolerate active cervical range of motion to 25-50% of total range of motion in all planes. Ongoing  Garo will be able to maintain a chin tuck for 10 seconds without increases in pain. Ongoing  Garo will report being able to tolerate 30 minutes of exercise and cardiovascular activity without increases in his neck pain. progressing  Garo will improve his distance on the 6MWT to 1616 to show improvements in his ambulatory endurance. (MDC for spinal cord injury = 45.8 m or ~150 feet). ongoing  Garo will improve his score on the FGA to 18/30 to show improvements in his dynamic balance with gait activities and his fall risk. ongoing    PLAN     Plan of care Certification: 2/13/2023 to 4/10/2023.     Outpatient Physical Therapy 2 times weekly for 8 weeks to include the following interventions: Cervical/Lumbar Traction, Electrical Stimulation, Gait Training, Manual  Therapy, Moist Heat/ Ice, Neuromuscular Re-ed, Orthotic Management and Training, Patient Education, Therapeutic Activities, and Therapeutic Exercise.     I certify that I was present in the room directing QUINCY Taveras  in service delivery and guiding them using my skilled judgment. As the co-signing therapist I have reviewed the students documentation and am responsible for the treatment, assessment, and plan.      Dorothea Carver, Physical Therapist Assistant

## 2023-03-17 ENCOUNTER — CLINICAL SUPPORT (OUTPATIENT)
Dept: REHABILITATION | Facility: HOSPITAL | Age: 33
End: 2023-03-17
Payer: MEDICAID

## 2023-03-17 DIAGNOSIS — M24.549 CONTRACTURE OF HAND: ICD-10-CM

## 2023-03-17 DIAGNOSIS — R27.8 DECREASED COORDINATION: ICD-10-CM

## 2023-03-17 DIAGNOSIS — M25.60 DECREASED RANGE OF MOTION: Primary | ICD-10-CM

## 2023-03-17 DIAGNOSIS — R53.1 DECREASED STRENGTH: ICD-10-CM

## 2023-03-17 PROCEDURE — 97530 THERAPEUTIC ACTIVITIES: CPT | Mod: PN

## 2023-03-17 NOTE — PLAN OF CARE
.[facility]  Speech Therapy Evaluation -Neurological Rehabilitation    Date: 3/16/2023     Name: Phong Fofana   MRN: [id number]    Therapy Diagnosis:   Encounter Diagnosis   Name Primary?    S/P cervical spinal fusion     Physician: Tobi Levin DO  Physician Orders: Ambulatory Referral to Speech Therapy   Medical Diagnosis: S/P cervical spinal fusion [Z98.1]    Visit # / Visits Authorized:  1 / 1   Date of Evaluation:  3/16/2023   Insurance Authorization Period: 3/10/2023 to 12/31/2023  Plan of Care Certification:    3/16/2023 to 5/12/2023      Time In:1430   Time Out: 1510   Procedure Min.   Evaluation of Speech Sound Production  40     Precautions: Standard, Fall, Communication, and Status post cervical fusion   Subjective   Date of Onset: April 2022  History of Current Condition:  Phong Fofana is a 33 y.o. male  who presents to [facility] Outpatient Speech Therapy for evaluation secondary to s/p cervical fusion. Patient was referred to therapy by Tobi Levin DO , which is the patient's neurosurgeon. The patient in nonverbal since end of April 2022.He arrived to the evaluation alone. His father came the next morning to aid in supplementing the history which was gathered through the chart review:     Per Physical Therapy eval:  In April 2022 Garo's parent's got a phone call that he was in the hospital in [place]. He was found on the side of the road after having a seizure and possible stroke. He has a history of prior seizures that was not disclosed to his family. He moved back to [place] at the end of April and gradually developed weakness and tone in both arms and foot drop in his right foot.  He has been getting medical care and established a PCP. Family still hasn't figured out why he lost his voice however he can text and is cognitively intact. He got an MRI a few weeks ago and saw changes in his cervical spine. Ultimately, he underwent surgery to  remove compression on his spinal cord. He is able to walk with less gait deviations (no more foot drop). He used to be hypersensitive (pain 15/10 with light touch to different parts of his body) but this has improved and is about 2-3/10 mostly in his neck due to his surgery. His family wants to know what's going on with his voice and wants to improve his hand function. He still has light sensitivity and flourescent lights bother him more than regular lights. He reports weakness in his hands, knees and hips but foot drop has gotten better. No falls or near falls recently. He is just now starting to get more comfortable walking around and getting back into daily activities but he is still a little nervous to take brace off when walking because he doesn't want to mess up his surgery.     After speaking with father- patient's speech was dysarthric after the seizures, but was mostly intelligible; however, this slowly declines over 3 weeks and since he has been unable to speak in 11 months.     Past Medical History: Phong Fofana  has a past medical history of Abnormal gait, Anxious depression, Back pain, Bipolar disorder, unspecified, Contracture of hand, Depression, Migraines, Neck pain, Nonverbal, Photosensitivity, Schizophrenia, unspecified, and Seizures.  Phong Fofana  has a past surgical history that includes Tympanostomy tube placement; Wrist surgery (Left); Spine surgery; Fusion of cervical spine by posterior approach (N/A, 1/17/2023); and Cervical laminectomy with spinal fusion (N/A, 1/17/2023).  Medical Hx and Allergies: Phong has a current medication list which includes the following prescription(s): baclofen, cefadroxil, gabapentin, levetiracetam, endocet, oxycodone-acetaminophen, and quetiapine. Review of patient's allergies indicates:  No Known Allergies  Prior Therapy:  no prior Speech Therapy evaluation- Physical Therapy and Occupational Therapy outpatient at [facility]   Social History:   Patient lives with parents in [place], Patient is not currently driving  Prior Level of Function: independent this time last year   Current Level of Function: unable to verbally communicate   Pain: 0/10  Pain Location / Description: no pain indicated throughout session   Nutrition:  oral diet, IDDSI 0 (Thin), and IDDSI 7 (Regular), reports some dysphagia   Patient's Therapy Goals:  increase communication   Objective   Formal Assessment:  Cranial Nerve Examination  Cranial Nerve 5: Trigeminal Nerve  Motor Jaw Posture at rest: Closed  Mandible Elevation/Depression: reduced ROM- tightness  Mandible lateralization: unable to move left   Abnormal movement: present- reported deficits left side  Interpretation: unable to rule out cranial nerve involvement    Sensory Forehead: WFL  Cheek: WFL  Jaw: WFL  Facial Pain: None noted Interpretation: no deficits noted      Cranial Nerve 7: Facial Nerve  Motor Facial Symmetry: eye droop and mask-like expression right eye   Wrinkle Forehead: WFL  Close eyes tightly: WFL  Labial Protrusion: Decreased ROM - especially left   Labial Retraction: Deviation-left side- decreased range of motion   Buccal Strength with Labial Seal: Reduced  Abnormal movement: absent Interpretation: unable to rule out cranial nerve involvement    Sensory Formal testing not completed. Hamblen taste on back of tongue       Cranial Nerves IX and X: Glossopharyngeal and Vagus Nerves  Motor Palatal Symmetry (Rest): lower right   Palatal Symmetry (Movement): No elevation right  Cough: Perceptually wheezy  Voice Prior to PO intake: unable to assess   Resonance: Hypernasal  Abnormal movement: absent Interpretation: unable to rule out cranial nerve involvement      Cranial Nerve XII: Hypoglossal Nerve  Motor Tongue at rest: WNL  Lingual Protrusion: Unable to protrude past lips  Lingual Protrusion against Resistance: Weakness  Lingual Lateralization: Inability to move left  Abnormal movement: absent Interpretation: unable  to rule out cranial nerve involvement      Other information:  Volitional Swallow: Able to palpate laryngeal rise  Mucosal Quality: Xerostomia  Secretion Management: adequate   Dentition: Good condition for speech and mastication     Voicing attempts:  attempted yes, with 0% intelligibility; however, noted voicing completed but weak  Cough was weak and wheezy, but audible     Follow up with Otolaryngology (ENT) warranted to establish function of vocal folds      AAC options discussed with the patient:  Speech assistant- patient was able to generate messages and liked the affordability of this device which we will instal next session, this would supplement what he is currently doing with audibly reading his messages which are typed.   Snap core, dialogue AAC, GRID, and Touch chat- presented to the patient as an option, however, he does not have a tablet and a designated communication device would likely be required- reassess with AAC eval if indicated       Description:  Language Skills:   Auditory Comprehension: Informally judged to be WNL. Pt was able to hold a conversation with ease and follow multi-step commands.   Verbal Expression: limited by dysarthria   Reading Comprehension: informally assessed to be WFL   Written Expression: no spelling errors while typing messages to communicate with me, normal grammar, appropriate answers     Cognition: informally assessed to be WFL     Motor Speech Skills: Severe dysarthria with currently 0% speech intelligibility. Some vocalization noted, but severely decreased articulatory kinematics. Hypernasality with nasal emissions noted on attempts to vocalize    Treatment   Total Treatment Time Separate from Evaluation: n/a   no treatment performed secondary to time to complete evaluation.    Education: Plan of Care, role of SLP in care, course of medical disease affect on therapy diagnosis , scheduling/ cancellation policy, and communication software were discussed with patient.  Patient and family members expressed understanding.     Home Program: not yet established   Assessment     Garo presents to [facility] status post medical diagnosis of s/p cervical fusion. He presents with severe (likely Flaccid, but possibly spastic) dysarthria characterized by 0% intelligibility and no useable speech approximations within the session. He has been nonverbal for 11 months without speech intervention. Speech Therapy Plan of Care will focus on establishing a functional form of communication  Demonstrates impairments including limitations as described in the problem list. Positive prognostic factors include family support and prior level of functioning. Negative prognostic factors include time since onset. No barriers to therapy identified. Patient will benefit from skilled, outpatient neurological rehabilitation speech therapy.    Rehab Potential: good  Pt's spiritual, cultural, and educational needs considered and patient agreeable to plan of care and goals.    Short Term Goals (4 weeks):   Patient will see Otolaryngology (ENT)   Patient and Speech Language Pathologist will establish a functional form of communication for the patient.  Patient will approximate basic answers verbally with 20% speech intelligibility   4. Patient will complete a Fiberoptic Endoscopic Evaluation of the Swallow (FEES) to assess swallow function.    Long Term Goals (8 weeks):   Patient will utilize a communication software to functionally communicate independently.    Plan     Plan of Care Certification Period: 3/16/2023 to 5/12/2023    Recommended Treatment Plan:  Patient will participate in the [facility] program for speech therapy 2 times per week for 8 weeks to address his Communication and Motor Speech deficits, to educate patient and their family, and to participate in a home exercise program.    Other Recommendations:   Referral to Otolaryngology (ENT)  Fiberoptic Endoscopic Evaluation  of the Swallow (FEES)    Therapist's Name:   Gina Garrido CCC-SLP   3/16/2023     I CERTIFY THE NEED FOR THESE SERVICES FURNISHED UNDER THIS PLAN OF TREATMENT AND WHILE UNDER MY CARE    Physician Name: _______________________________    Physician Signature: ____________________________

## 2023-03-17 NOTE — PROGRESS NOTES
[facility]  Occupational Therapy Treatment Note    Date: 3/17/2023  Name: Phong Fofana  Clinic Number: [id number]    Therapy Diagnosis:   Encounter Diagnoses   Name Primary?    Decreased range of motion Yes    Decreased coordination     Decreased strength     Contracture of hand      Physician: Tobi Levin DO    Physician Orders: OT eval and treat   Medical Diagnosis:   Z98.890 (ICD-10-CM) - History of cervical spinal surgery  Evaluation Date: 2/28/2023  Progress Notes Due: 3/31/23, 4/28/23  Plan of Care Expiration Period: 5/26/2023   Insurance Authorization period Expiration: 12/31/2023  Date of Return to MD: 3/20/2023 / Dr. Levin neurosurgery   Visit # / Visits Authorized: 4/ 40 (+eval)  FOTO: to be assessed         Time In: 0757  Time Out: 0845  Total Billable (one on one) Time: 45 minutes     Precautions: Standard, Fall, Seizure, and Post-op cervical fusion  Post-op week 3; Per patient's dad, MD orders are as follows: wear brace to sleep and to eat, try to take brace off when he's sitting up, maybe off a little when he's walking.  Per [facility] Surgeon Protocol weeks 0-8:   1. Prevent excessive initial mobility or stress on tissues  2. Limit overhead arm movements, bending and lifting.  3. Please follow physician recommendations regarding use of collars etc. (multilevel fusions hard  collar for 6 wks; one-level fusions wear a collar as needed for a week or two)  No prone, no bridging, no lying with a pillow, no active neck movement for a few weeks.     SUBJECTIVE     Pt reports: Garo indicates that he still feels a stretch with the foam in-hand orthosis provided by OT.     He was compliant with home exercise program given last session .  Response to previous treatment: no concerns from eval  Functional change:   no significant change compared to previous session per pt report     Pain: 0/10 at rest; increased pain with stretching/ exercises, but not  quantified  Location:  fingers     OBJECTIVE     Objective Measures updated at progress report unless specified.    Treatment     Garo received the treatments listed below:     Therapeutic activities to improve functional performance for 45 minutes, including:    Gentle mobilization of joints involving the right UE phalanges, metacarpals, carpals, radius and ulna to facilitate increases in passive movement. Gentle stretching of the soft tissues of the right wrist and hand to decrease edema and improve PROM, potentially allowing for increases in active movement. Extent of movement limited by pain, though not quantified.   AAROM thumb IP extension AROMx10. Blocked 4th & 5th digits DIP flexion AROM x10 each. Blocked 4th & 5th digit PIP extension AROM x10 each.     Right thumb IP extension  = -74a, -64p    Small custom orthosis for the right thumb to limit IP flexion at rest, positioning him at -70*. Fit appeared good with no complaints and no areas of concern. Also issued an oval-8 size 6 for the right ring finger DIP to encourage biomechanically correct postioning & prevent hyperextension.         OT fabricated tnemorary foam-based orthosis for the left upper extremity as the prior foam orthosis was loose in his hand. OT used a pool noodle (due to firm construction) and trimmed it as necessary for biomechanically correct positioning of the small finger. Added strap with d-ring for best positioning. Fit appeared good with no complaints and no areas of concern.     In orthosis:  Left thumb mcp extension=-38  Left thumb IP extension=-57    In orthosis, left PIP extension:  Index finger: -86  Long finger: -84  Ring finger: -85  Small finger: -87    Patient Education and Home Exercises      Education provided:   - ed patient & step-dad re: orthotic wear/ care. Dad verbalized understanding of donning orthoses.   - will need help from family for blocking exercises.   - Progress towards goals     Written Home Exercises  Provided: Patient instructed to cont prior HEP.  Exercises were reviewed and Garo was able to demonstrate them prior to the end of the session. Garo demonstrated good  understanding of the HEP provided. See EMR under Patient Instructions for exercises provided during therapy sessions.       Assessment     Pt would continue to benefit from skilled OT. Garo's right thumb demonstrates improvements in passive range towards extension. His left hand hand isn't progressing as quickly, but we will continue to address it.  Will need to continue addressing blocking exercises and \low-load prolonged stretch (LLPS) for maximum potential functional return.     Garo is progressing well towards his goals and there are no updates to goals at this time. Pt prognosis is Good.     Pt will continue to benefit from skilled outpatient occupational therapy to address the deficits listed in the problem list on initial evaluation provide pt/family education and to maximize pt's level of independence in the home and community environment.     Pt's spiritual, cultural and educational needs considered and pt agreeable to plan of care and goals.    Anticipated barriers to occupational therapy:  communication; spinal precautions     Goals:  Short Term Goals: 6 weeks   1) Pt will be (I) with initial HEP (progressing 3/17/2023)   2) Pt will be (I) with manual stretching program to increase functional use of bilateral hands (progressing 3/17/2023)   3) Pt will gain 4 lbs of  strength bilaterally for increased participation during functional tasks (progressing 3/17/2023)   4) Pt will be able to open a medication bottle with Mod I 5/5 attempts (progressing 3/17/2023)   5) Cervical range of motion to be assessed once precautions lifted and goal(s) to be added as necessary (progressing 3/17/2023)   6) Pt will complete 10 minutes on the ergometer, standing, level 3.0 for increased standing/activity tolerance (once precautions are lifted)   (progressing 3/17/2023)   7). Pt will wash and dry 10 cups/dishes with mod I.  (progressing 3/17/2023)      Long Term Goals: 12 weeks   1) Mod I - cutting food (progressing 3/17/2023)   2) Mod I - buttons and fasteners (progressing 3/17/2023)   3) Pt will be able to tie his shoes, Mod I, in a timely manner. (progressing 3/17/2023)   4) Pt will increase fine motor coordination, as evidenced by a decrease of 12 seconds bilaterally during 9 Hole Peg test (progressing 3/17/2023)   5) Pt will increase gross motor coordination, as evidenced by an increase of 10 blocks bilaterally during Box and Block. (progressing 3/17/2023)   6) Pt will self report increased ability to turn steering wheel on riding  to successfully cut the grass in one location at his residence (front or back yard). (progressing 3/17/2023)      Additional functional goals to be added as pt progresses and per his priorities.     PLAN     Certification Period/Plan of care expiration: 2/28/2023 to 5/26/2023.     Outpatient Occupational Therapy 2 times weekly for 12 weeks to include the following interventions: Manual Therapy, Neuromuscular Re-ed, Orthotic Management and Training, Patient Education, Self Care, Therapeutic Activities, and Therapeutic Exercise.     Updates/Grading for next session: review blocking exercises; upgrade orthosis as appropriate.    Shagufta Rucker, OT

## 2023-03-20 ENCOUNTER — CLINICAL SUPPORT (OUTPATIENT)
Dept: REHABILITATION | Facility: HOSPITAL | Age: 33
End: 2023-03-20
Payer: MEDICAID

## 2023-03-20 ENCOUNTER — OFFICE VISIT (OUTPATIENT)
Dept: NEUROSURGERY | Facility: CLINIC | Age: 33
End: 2023-03-20
Payer: MEDICAID

## 2023-03-20 ENCOUNTER — HOSPITAL ENCOUNTER (OUTPATIENT)
Dept: RADIOLOGY | Facility: HOSPITAL | Age: 33
Discharge: HOME | End: 2023-03-20
Attending: NEUROLOGICAL SURGERY
Payer: MEDICAID

## 2023-03-20 ENCOUNTER — TELEPHONE (OUTPATIENT)
Dept: ORTHOPEDICS | Facility: CLINIC | Age: 33
End: 2023-03-20
Payer: MEDICAID

## 2023-03-20 VITALS — SYSTOLIC BLOOD PRESSURE: 122 MMHG | DIASTOLIC BLOOD PRESSURE: 71 MMHG | HEART RATE: 64 BPM

## 2023-03-20 DIAGNOSIS — Z98.1 S/P CERVICAL SPINAL FUSION: Primary | ICD-10-CM

## 2023-03-20 DIAGNOSIS — M24.542 CONTRACTURE OF HAND JOINT, LEFT: ICD-10-CM

## 2023-03-20 DIAGNOSIS — R47.1 DYSARTHRIA: ICD-10-CM

## 2023-03-20 DIAGNOSIS — M54.2 NECK PAIN: ICD-10-CM

## 2023-03-20 PROCEDURE — 1159F PR MEDICATION LIST DOCUMENTED IN MEDICAL RECORD: ICD-10-PCS | Mod: CPTII,S$GLB,, | Performed by: NEUROLOGICAL SURGERY

## 2023-03-20 PROCEDURE — 3078F PR MOST RECENT DIASTOLIC BLOOD PRESSURE < 80 MM HG: ICD-10-PCS | Mod: CPTII,S$GLB,, | Performed by: NEUROLOGICAL SURGERY

## 2023-03-20 PROCEDURE — 99024 PR POST-OP FOLLOW-UP VISIT: ICD-10-PCS | Mod: S$GLB,,, | Performed by: NEUROLOGICAL SURGERY

## 2023-03-20 PROCEDURE — 92507 TX SP LANG VOICE COMM INDIV: CPT | Mod: PN

## 2023-03-20 PROCEDURE — 3074F SYST BP LT 130 MM HG: CPT | Mod: CPTII,S$GLB,, | Performed by: NEUROLOGICAL SURGERY

## 2023-03-20 PROCEDURE — 1159F MED LIST DOCD IN RCRD: CPT | Mod: CPTII,S$GLB,, | Performed by: NEUROLOGICAL SURGERY

## 2023-03-20 PROCEDURE — 72040 X-RAY EXAM NECK SPINE 2-3 VW: CPT | Mod: 26,,, | Performed by: RADIOLOGY

## 2023-03-20 PROCEDURE — 99024 POSTOP FOLLOW-UP VISIT: CPT | Mod: S$GLB,,, | Performed by: NEUROLOGICAL SURGERY

## 2023-03-20 PROCEDURE — 72040 XR CERVICAL SPINE AP LATERAL: ICD-10-PCS | Mod: 26,,, | Performed by: RADIOLOGY

## 2023-03-20 PROCEDURE — 3078F DIAST BP <80 MM HG: CPT | Mod: CPTII,S$GLB,, | Performed by: NEUROLOGICAL SURGERY

## 2023-03-20 PROCEDURE — 3074F PR MOST RECENT SYSTOLIC BLOOD PRESSURE < 130 MM HG: ICD-10-PCS | Mod: CPTII,S$GLB,, | Performed by: NEUROLOGICAL SURGERY

## 2023-03-20 PROCEDURE — 72040 X-RAY EXAM NECK SPINE 2-3 VW: CPT | Mod: TC,FY

## 2023-03-20 RX ORDER — HYDROCODONE BITARTRATE AND ACETAMINOPHEN 5; 325 MG/1; MG/1
1 TABLET ORAL EVERY 6 HOURS PRN
Qty: 20 TABLET | Refills: 0 | Status: SHIPPED | OUTPATIENT
Start: 2023-03-20

## 2023-03-20 NOTE — Clinical Note
Good morning,   This patient is complaint of dysphagia and I believe she would benefit from a Fiberoptic Endoscopic Evaluation of the Swallow (FEES). Please place the order if you agree.   Gina Garrido M.A. CCC-SLP, CBIS Speech Language Pathologist Certified Brain Injury Specialist 3/21/2023

## 2023-03-20 NOTE — PROGRESS NOTES
Mr. Fofana is status post uneventful C3-4 C4-5 laminectomy with instrumented fusion performed on 01/17/2023 for refractory left neck and left cervical radiculopathy.  He reports resolved neck and left arm pain.  He reports improved incision pain. He has ongoing expressive aphasia with photophobia and phonophobia of unclear etiology.  He reports no recent seizure activity.  He denies new or worsened symptoms.  He denies resuming smoking cigarettes.  He requests orthopedic hand surgery consultation regarding preoperative left hand contracture.    X-rays cervical spine obtained today were reviewed with the patient and his family.  Spinal implants stable.  No complications.  Alignment maintained.        Exam:  Pleasant  Awake, alert, oriented to person place and time.    Cranial nerves grossly intact.    Motor grossly 5/5 in all muscle groups.  Left hand contracted.  Sensation is grossly intact throughout.    Gait grossly normal   Posterior cervical incision well healed    Analysis:  Relative to the preoperative C3-4 and C4-5 pathology he has done well postoperatively. I recommend continued PT and OT until maximal medical benefit is achieved.  I referred him to Dr. Barrios relative to the left hand contracture; it is not clear to me if this is amenable to surgical intervention.  We will see him back in 3 months with repeat cervical x-rays for review.  I advised him that he may discontinue wearing the cervical orthosis and to continue with smoking cessation.    Phong was seen today for follow-up.    Diagnoses and all orders for this visit:    S/P cervical spinal fusion  -     Ambulatory referral/consult to Orthopedics; Future    Contracture of hand joint, left  -     Ambulatory referral/consult to Orthopedics; Future    Other orders  -     HYDROcodone-acetaminophen (NORCO) 5-325 mg per tablet; Take 1 tablet by mouth every 6 (six) hours as needed for Pain.

## 2023-03-21 ENCOUNTER — TELEPHONE (OUTPATIENT)
Dept: REHABILITATION | Facility: HOSPITAL | Age: 33
End: 2023-03-21

## 2023-03-21 ENCOUNTER — CLINICAL SUPPORT (OUTPATIENT)
Dept: REHABILITATION | Facility: HOSPITAL | Age: 33
End: 2023-03-21
Payer: MEDICAID

## 2023-03-21 DIAGNOSIS — R27.8 DECREASED COORDINATION: ICD-10-CM

## 2023-03-21 DIAGNOSIS — R53.1 DECREASED STRENGTH: ICD-10-CM

## 2023-03-21 DIAGNOSIS — M25.60 DECREASED RANGE OF MOTION: Primary | ICD-10-CM

## 2023-03-21 DIAGNOSIS — R26.89 ABNORMALITY OF GAIT DUE TO IMPAIRMENT OF BALANCE: Primary | ICD-10-CM

## 2023-03-21 DIAGNOSIS — Z98.1 S/P CERVICAL SPINAL FUSION: ICD-10-CM

## 2023-03-21 DIAGNOSIS — M24.549 CONTRACTURE OF HAND: ICD-10-CM

## 2023-03-21 PROBLEM — R47.1 DYSARTHRIA: Status: ACTIVE | Noted: 2023-03-21

## 2023-03-21 PROCEDURE — 97116 GAIT TRAINING THERAPY: CPT | Mod: PN

## 2023-03-21 PROCEDURE — 97110 THERAPEUTIC EXERCISES: CPT | Mod: PN

## 2023-03-21 PROCEDURE — 97530 THERAPEUTIC ACTIVITIES: CPT | Mod: PN

## 2023-03-21 NOTE — TELEPHONE ENCOUNTER
Speech Language Pathologist contact primary care provider office for the following:  Referral to Otolaryngology (ENT)  Scheduling appointment-  calling patient to schedule.     - Damari- to forward message to MD. Gina Garrido M.A. CCC-SLP, IS  Speech Language Pathologist  Certified Brain Injury Specialist  3/21/2023

## 2023-03-21 NOTE — PROGRESS NOTES
[facility]  Occupational Therapy Treatment Note    Date: 3/21/2023  Name: Phong Fofana  Clinic Number: [id number]    Therapy Diagnosis:   Encounter Diagnoses   Name Primary?    Decreased range of motion Yes    Decreased coordination     Decreased strength     Contracture of hand      Physician: Tobi Levin,     Physician Orders: OT eval and treat   Medical Diagnosis:   Z98.890 (ICD-10-CM) - History of cervical spinal surgery  Evaluation Date: 2/28/2023  Progress Notes Due: 3/31/23, 4/28/23  Plan of Care Expiration Period: 5/26/2023   Insurance Authorization period Expiration: 12/31/2023  Date of Return to MD: 3/20/2023 / Dr. Levin neurosurgery   Visit # / Visits Authorized: 5/ 40 (+eval)  FOTO:         Time In: 10:30 AM  Time Out: 11:15 AM   Total Billable (one on one) Time: 45 minutes     Precautions: Standard, Fall, Seizure, and Post-op cervical fusion  Post-op week 3; Per patient's dad, MD orders are as follows: wear brace to sleep and to eat, try to take brace off when he's sitting up, maybe off a little when he's walking.  Per [facility] Protocol weeks 0-8:   1. Prevent excessive initial mobility or stress on tissues  2. Limit overhead arm movements, bending and lifting.  3. Please follow physician recommendations regarding use of collars etc. (multilevel fusions hard  collar for 6 wks; one-level fusions wear a collar as needed for a week or two)  No prone, no bridging, no lying with a pillow, no active neck movement for a few weeks.     SUBJECTIVE     Pt reports: Garo (via text to talk) stated that he went back to the surgeon. He is able to go without the Power collar and is only to put it on if he feels like he needs it.      He was compliant with home exercise program given last session .  Response to previous treatment: no concerns from eval  Functional change:  no significant change compared to previous session per pt report     Pain: 0/10 at rest; 5/10 at  end of session after stretching and exercises   Location:  fingers     OBJECTIVE     Objective Measures updated at progress report unless specified.    Treatment     Garo received the treatments listed below:     Therapeutic activities to improve functional performance for 45 minutes, including:    Gentle mobilization of joints involving the right UE phalanges, metacarpals, carpals, radius and ulna to facilitate increases in passive movement.     Gentle stretching of the soft tissues of the right wrist and hand to decrease edema and improve PROM, potentially allowing for increases in active movement. Extent of movement limited by pain, though not quantified.     AAROM thumb IP extension AROMx10. Blocked 4th & 5th digits DIP flexion AROM x10 each. Blocked 4th & 5th digit PIP extension AROM x10 each.     Thumb abduction (thumbs up) 2x10, decreased range of motion of thumb (right)    Thumb opposition 2x10 with flexion in IP joint of thumb (right)    Claw and intrinsic plus 10x (right)     Custom orthoses and skin checked to ensure no areas of concern since bringing them home. Small orthosis was placed too tight. He was instructed to keep the strap snug, but not so tight that it continues to cause skin indentations. Foam based orthosis with good fit and no concerns.     Patient Education and Home Exercises      Education provided:   - ed patient & step-dad re: orthotic wear/ care. Dad verbalized understanding of donning orthoses.   - will need help from family for blocking exercises.   - Progress towards goals     Written Home Exercises Provided: Patient instructed to cont prior HEP.  Exercises were reviewed and Garo was able to demonstrate them prior to the end of the session. Garo demonstrated good  understanding of the HEP provided. See EMR under Patient Instructions for exercises provided during therapy sessions.       Assessment     Pt would continue to benefit from skilled OT. Garo has been wearing his  orthoses and has no concerns. Pain noted in both hands at the end of the session after all exercises and stretching.  Will need to continue addressing blocking exercises and \low-load prolonged stretch (LLPS) for maximum potential functional return.     Garo is progressing well towards his goals and there are no updates to goals at this time. Pt prognosis is Good.     Pt will continue to benefit from skilled outpatient occupational therapy to address the deficits listed in the problem list on initial evaluation provide pt/family education and to maximize pt's level of independence in the home and community environment.     Pt's spiritual, cultural and educational needs considered and pt agreeable to plan of care and goals.    Anticipated barriers to occupational therapy:  communication; spinal precautions     Goals:  Short Term Goals: 6 weeks   1) Pt will be (I) with initial HEP (progressing 3/21/2023)   2) Pt will be (I) with manual stretching program to increase functional use of bilateral hands (progressing 3/21/2023)   3) Pt will gain 4 lbs of  strength bilaterally for increased participation during functional tasks (progressing 3/21/2023)   4) Pt will be able to open a medication bottle with Mod I 5/5 attempts (progressing 3/21/2023)   5) Cervical range of motion to be assessed once precautions lifted and goal(s) to be added as necessary (progressing 3/21/2023)   6) Pt will complete 10 minutes on the ergometer, standing, level 3.0 for increased standing/activity tolerance (once precautions are lifted)  (progressing 3/21/2023)   7). Pt will wash and dry 10 cups/dishes with mod I.  (progressing 3/21/2023)      Long Term Goals: 12 weeks   1) Mod I - cutting food (progressing 3/21/2023)   2) Mod I - buttons and fasteners (progressing 3/21/2023)   3) Pt will be able to tie his shoes, Mod I, in a timely manner. (progressing 3/21/2023)   4) Pt will increase fine motor coordination, as evidenced by a decrease of  12 seconds bilaterally during 9 Hole Peg test (progressing 3/21/2023)   5) Pt will increase gross motor coordination, as evidenced by an increase of 10 blocks bilaterally during Box and Block. (progressing 3/21/2023)   6) Pt will self report increased ability to turn steering wheel on riding  to successfully cut the grass in one location at his residence (front or back yard). (progressing 3/21/2023)      Additional functional goals to be added as pt progresses and per his priorities.     PLAN     Certification Period/Plan of care expiration: 2/28/2023 to 5/26/2023.     Outpatient Occupational Therapy 2 times weekly for 12 weeks to include the following interventions: Manual Therapy, Neuromuscular Re-ed, Orthotic Management and Training, Patient Education, Self Care, Therapeutic Activities, and Therapeutic Exercise.     Updates/Grading for next session: review blocking exercises; upgrade orthosis as appropriate.    Fernanda Capellan OT

## 2023-03-21 NOTE — PROGRESS NOTES
[facility]  Speech Therapy Treatment Note- Neurological Rehabilitation  Date: 3/20/2023     Name: Phong Fofana   MRN: [id number]   Therapy Diagnosis:   Encounter Diagnosis   Name Primary?    Dysarthria    Physician: Tobi Levin DO  Physician Orders: Ambulatory Referral to Speech Therapy   Medical Diagnosis: s/p cervical fusion     Visit #/ Visits Authorized: 1/ 99  Date of Evaluation:  5/12/2023  Insurance Authorization Period: 3/16/2023 - 12/31/2023  Plan of Care Expiration Date:    5/12/2023  Extended Plan of Care:  not applicable    Progress Note: due 4/16/2023   Visits Cancelled: 0  Visits No Show: 0    Time In:  1300  Time Out:  1345  Total Billable Time: 45     Precautions: Standard, Fall, Communication- utilizes application to communicate, and Status post cervical fusion   Subjective:   Patient reports: he is excited to use the speech assistant application, but was concerned about the cost. When looking- there was a free version for [employer], that Speech Language Pathologist and patient opted to use   He was compliant to home exercise program.   Response to previous treatment: first treatment    Pain Scale:  0/10 on a Visual Analog Scale currently.   Pain Location: no pain indicated throughout session   Objective:      UNTIMED  Procedure   Speech- Language- Voice Therapy   Total Timed Units: 0  Total Untimed Units: 1  Charges Billed/Number of units: 1    Short Term Goals: (4 weeks) Current Progress:   Patient will see Otolaryngology (ENT)     Progressing/ Not Met 3/20/2023   Speech Language Pathologist contacting Dr. Suarez for Otolaryngology (ENT) referral    2. Patient and Speech Language Pathologist will establish a functional form of communication for the patient.     Progressing/ Not Met 3/20/2023   Speech Language Pathologist and patient downloaded Speech assistant onto hs phone and trained how to utilize the software. Patient was able to utilize independently by the end of the  "session     3. Patient will approximate basic answers verbally with 20% speech intelligibility      Progressing/ Not Met 3/20/2023   Increased noted verbalizations this session. Noted phonation for no "m-m"    4. Patient will complete a Fiberoptic Endoscopic Evaluation of the Swallow (FEES) to assess swallow function.     Progressing/ Not Met 3/20/2023   Speech Language Pathologist to request orders from Dr. Levin      Patient Education/Response:   Patient educated regarding the followin. Process of an AAC evaluation   MOM to schedule an appointment with Dr. Erick Ferrera to call medicaid to see which SGD are in network for the patient   2. Use of  AAC  3. Referrals and follow ups needed    Patient verbalized understanding to all above education provided.     Home program established: yes-use of AAC application  Exercises were reviewed and Garo was able to demonstrate them prior to the end of the session.  Garo demonstrated good  understanding of the education provided.     See Electronic Medical Record under Patient Instructions for exercises provided throughout therapy.  Assessment:   Garo is progressing well towards his goals. Patient was able to utilize communication software by the end of the session independently. It was relatively quick and efficient for increasing functional communication. Therapy planning completed with mom and patient regarding AAC evaluation, Otolaryngology (ENT) referrals, and Fiberoptic Endoscopic Evaluation of the Swallow (FEES) order request. Current goals remain appropriate. Goals to be updated as necessary.     Patient prognosis is Good. Patient will continue to benefit from skilled outpatient speech and language therapy to address the deficits listed in the problem list on initial evaluation, provide patient/family education and to maximize patient's level of independence in the home and community environment.   Medical necessity is demonstrated by the following " IMPAIRMENTS:  Decreased speech intelligibility results in decreased efficiency communicating medical and social wants and needs in the case of emergency.   Barriers to Therapy: none identified   Patient's spiritual, cultural and educational needs considered and patient agreeable to plan of care and goals.  Plan:   Continue Plan of Care with focus on establishing a functional and long term functional communication system.     Gina Garrido CCC-SLP   3/20/2023

## 2023-03-21 NOTE — PROGRESS NOTES
[facility]   Physical Therapy Treatment Note     Name: Phong Fofana  Clinic Number: [id number]    Therapy Diagnosis:   No diagnosis found.    Physician: Tobi Levin DO    Visit Date: 3/21/2023    Physician Orders: PT Eval and Treat   Medical Diagnosis from Referral:   H/O cervical spine surgery  - Primary     Z98.890      Evaluation Date: 2/13/2023  Authorization Period Expiration: 2/9/2023 - 2/9/3034  Plan of Care Expiration: 4/10/2023  Visit # / Visits authorized: 10/20 (11)    PTA Visit #: 0/5     Time In: 0930  Time Out: 1015  Total Billable Time: 45 minutes     Precautions: Standard, Fall, and Post-op cervical fusion  Surgery date: 1/17/23 (Post-op week 9)   Per patient's dad, MD orders are as follows: wear brace to sleep and to eat, try to take brace off when he's sitting up, maybe off a little when he's walking.  Per [facility] Protocol weeks 8-12:   Avoid cervical loading (overhead arm resisted movements)  Avoid passive stretching of cervical spine    SUBJECTIVE     Pt reports: Patient states that he is doing good today. Feels good to be out of his brace.     He was compliant with home exercise program  Response to previous treatment: no problems stated  Functional change: ongoing    Pain: 3/10  Location: neck      OBJECTIVE     Objective Measures updated at progress report unless specified.     Treatment     Garo received the treatments listed below:       Garo received therapeutic exercises to develop strength, endurance, range of motion, and flexibility for 18 minutes including:     NuStep recumbent stepper level 4 x 3 minutes   Deep neck flexor isometrics with blood pressure cuff for biofeedback 10 x 10 second holds  AROM cervical rotation, sidebending, and flexion 1 minute each direction  Sit to stands on airex pad 2 x10      Garo participated in gait training to improve functional mobility and safety for 27  minutes, including:    GaitAsifer      Duration Speed Distance Obstacles Navigation Level Handrails Score   Trial 1 13 1.9 736 yards 64% 3/4 1 none 1132      Left Right   Hurdles  7/9  4/6    Puddles 5/9 2/4   Total 66% 60%     -- RPE rated at 7/20 following trial    -- Skilled setup and breakdown required. Therapeutic rest break following LOB to ensure patient safety and readiness to continue activity    2. Backwards tandem walking in open floor x2 laps        Patient Education and Home Exercises     Home Exercises Provided and Patient Education Provided     Education provided:   - Patient provided with verbal and demonstrative instruction for all activities performed in today's session.  -Patient was encouraged to pay attention to arm swing during gait with more relaxed shoulders.     Written Home Exercises Provided: Patient instructed to cont prior HEP.  See EMR under Patient Instructions for exercises provided during therapy sessions.    ASSESSMENT     Garo provided good participation and effort during today's session with treatment focused on balance, endurance, and cervical mobility/stability. He performed well on the treadmill with using the GaitBetter system with reported RPE of 7/20 following walking bout; he would benefit from walking progression to open environments. He is progressing as expected with his cervical mobility and tolerating being out of his brace well. He continues to be appropriate for skilled therapy services to address his balance and cervical deficits.    Garo Is progressing towards his goals.   Pt prognosis is Excellent.     Pt will continue to benefit from skilled outpatient physical therapy to address the deficits listed in the problem list box on initial evaluation, provide pt/family education and to maximize pt's level of independence in the home and community environment.     Pt's spiritual, cultural and educational needs considered and pt agreeable to plan of care and goals.     Anticipated barriers to physical  therapy: None    Goals:   Short Term Goals: 4 weeks   Garo will report less difficulty with moderate activities like moving a table or pushing a vacuum on her FOTO to show improvements with his function. Ongoing.  Garo will perform 6 Minute walk test to assess ambulatory endurance and establish appropriate goals. Met  Garo will perform Functional Gait Assessment to assess dynamic balance and fall risk with walking in the home and community. Met  Garo will report being able to tolerate 15 minutes of exercise and cardiovascular activity without increases in his neck pain. Met     Long Term Goals: 8 weeks   Garo will improve is FOTO limitation score to 28% to show improvements in his overall functional mobility. Ongoing  Garo will report and demonstrate understanding of proper body mechanics with squatting and lifting activities. Ongoing  Garo will tolerate active cervical range of motion to 25-50% of total range of motion in all planes. MET  Garo will be able to maintain a chin tuck for 10 seconds without increases in pain. Ongoing  Garo will report being able to tolerate 30 minutes of exercise and cardiovascular activity without increases in his neck pain. progressing  Garo will improve his distance on the 6MWT to 1616 to show improvements in his ambulatory endurance. (MDC for spinal cord injury = 45.8 m or ~150 feet). ongoing  Garo will improve his score on the FGA to 18/30 to show improvements in his dynamic balance with gait activities and his fall risk. ongoing    PLAN     Plan of care Certification: 2/13/2023 to 4/10/2023.     Outpatient Physical Therapy 2 times weekly for 8 weeks to include the following interventions: Cervical/Lumbar Traction, Electrical Stimulation, Gait Training, Manual Therapy, Moist Heat/ Ice, Neuromuscular Re-ed, Orthotic Management and Training, Patient Education, Therapeutic Activities, and Therapeutic Exercise.     I certify that I was present in the room directing Krishna  QUINCY Infante  in service delivery and guiding them using my skilled judgment. As the co-signing therapist I have reviewed the students documentation and am responsible for the treatment, assessment, and plan.      Dorothea Carver, Physical Therapist Assistant

## 2023-03-22 ENCOUNTER — CLINICAL SUPPORT (OUTPATIENT)
Dept: REHABILITATION | Facility: HOSPITAL | Age: 33
End: 2023-03-22
Payer: MEDICAID

## 2023-03-22 DIAGNOSIS — R47.1 DYSARTHRIA: Primary | ICD-10-CM

## 2023-03-22 PROCEDURE — 92507 TX SP LANG VOICE COMM INDIV: CPT | Mod: PN

## 2023-03-22 NOTE — PROGRESS NOTES
[facility]  Speech Therapy Treatment Note- Neurological Rehabilitation  Date: 3/22/2023     Name: Phong Fofana   MRN: [id number]   Therapy Diagnosis:   Encounter Diagnosis   Name Primary?    Dysarthria Yes   Physician: Tobi Levin DO  Physician Orders: Ambulatory Referral to Speech Therapy   Medical Diagnosis: s/p cervical fusion     Visit #/ Visits Authorized: 2/ 99  Date of Evaluation:  5/12/2023  Insurance Authorization Period: 3/16/2023 - 12/31/2023  Plan of Care Expiration Date:    5/12/2023  Extended Plan of Care:  not applicable    Progress Note: due 4/16/2023   Visits Cancelled: 0  Visits No Show: 0    Time In:  2:30 PM  Time Out:  3:15 PM  Total Billable Time: 45 minutes      Precautions: Standard, Fall, Communication- utilizes application to communicate, and Status post cervical fusion   Subjective:   Patient reports: he likes the speech assistant application (free version) that is downloaded on his phone.      Father reports patient has an appointment with Dr. Suarez, ENT next week.      Speech assist  Edit: common phrases,   Patient independently edit demographics, hobbies/activities       Gesturing     He was compliant to home exercise program.   Response to previous treatment: first treatment    Pain Scale:  6/10 on a Visual Analog Scale currently.   Pain Location: neck   Objective:      UNTIMED  Procedure   Speech- Language- Voice Therapy   Total Timed Units: 0  Total Untimed Units: 1  Charges Billed/Number of units: 1    Short Term Goals: (4 weeks) Current Progress:   Patient will see Otolaryngology (ENT)     Progressing/ Not Met 3/22/2023   Patient to see Dr. Suarez for Otolaryngology (ENT) next week.    2. Patient and Speech Language Pathologist will establish a functional form of communication for the patient.     Progressing/ Not Met 3/22/2023   Speech Assistant tanya on his phone:  Patient independently typing sentences to express himself.  Creating buttons, editing  buttons  Patient with good ability to navigate application.    Programming:  Added demographic information, pain in his neck, and hobbies.    3. Patient will approximate basic answers verbally with 20% speech intelligibility      Progressing/ Not Met 3/22/2023   Very hypernasal.    Patient able to produce huh, uh, p, k when plugging nose and m (not plugging his nose) and given integral stimulation, articulatory placement cues, produced in unison.     Patient able to smack his lips given a model.    4. Patient will complete a Fiberoptic Endoscopic Evaluation of the Swallow (FEES) to assess swallow function.     Progressing/ Not Met 3/22/2023   Speech Language Pathologist to request orders from Dr. Levin        Patient Education/Response:   Patient educated regarding the followin. Process of an AAC evaluation   MOM to schedule an appointment with Dr. Suarez   [facility] [place] which [place] are in [place] for the patient   2. Use of  AAC  3. Referrals and follow ups needed  4. Programming Speech Assistant application.     Patient verbalized understanding to all above education provided.     Home program established: yes-Encouraged to use the speech assistant tanya to communicate.   Exercises were reviewed and Garo was able to demonstrate them prior to the end of the session.  Garo demonstrated good  understanding of the education provided.     See Electronic Medical Record under Patient Instructions for exercises provided throughout therapy.  Assessment:   Garo is progressing well towards his goals. Patient with excellent cooperation. Patient with fairly intact receptive skills. Patient exhibiting good ability to function and edit the Speech Assistant tanya for an AAC device. The free version of this tanya is limiting though and patient would benefit from receiving a personal speech generating device. Patient with good ability to formulate sentences via typing messages on a key board. Patient with hypernasality  and limited verbalizations. Therapy planning completed with mom and patient regarding AAC evaluation, Otolaryngology (ENT) referrals, and Fiberoptic Endoscopic Evaluation of the Swallow (FEES) order request. Current goals remain appropriate. Goals to be updated as necessary.     Patient prognosis is Good. Patient will continue to benefit from skilled outpatient speech and language therapy to address the deficits listed in the problem list on initial evaluation, provide patient/family education and to maximize patient's level of independence in the home and community environment.   Medical necessity is demonstrated by the following IMPAIRMENTS:  Decreased speech intelligibility results in decreased efficiency communicating medical and social wants and needs in the case of emergency.   Barriers to Therapy: none identified   Patient's spiritual, cultural and educational needs considered and patient agreeable to plan of care and goals.  Plan:   Continue Plan of Care with focus on establishing a functional and long term functional communication system.       Mallory Zuniga, IRMA-SLP   3/22/2023

## 2023-03-23 ENCOUNTER — CLINICAL SUPPORT (OUTPATIENT)
Dept: REHABILITATION | Facility: HOSPITAL | Age: 33
End: 2023-03-23
Payer: MEDICAID

## 2023-03-23 DIAGNOSIS — M24.549 CONTRACTURE OF HAND: ICD-10-CM

## 2023-03-23 DIAGNOSIS — R53.1 DECREASED STRENGTH: ICD-10-CM

## 2023-03-23 DIAGNOSIS — Z98.1 S/P CERVICAL SPINAL FUSION: ICD-10-CM

## 2023-03-23 DIAGNOSIS — M25.60 DECREASED RANGE OF MOTION: Primary | ICD-10-CM

## 2023-03-23 DIAGNOSIS — R26.89 ABNORMALITY OF GAIT DUE TO IMPAIRMENT OF BALANCE: Primary | ICD-10-CM

## 2023-03-23 DIAGNOSIS — R27.8 DECREASED COORDINATION: ICD-10-CM

## 2023-03-23 PROCEDURE — 97116 GAIT TRAINING THERAPY: CPT | Mod: PN

## 2023-03-23 PROCEDURE — 97110 THERAPEUTIC EXERCISES: CPT | Mod: PN

## 2023-03-23 PROCEDURE — 97530 THERAPEUTIC ACTIVITIES: CPT | Mod: PN

## 2023-03-23 NOTE — PROGRESS NOTES
[facility]   Physical Therapy Treatment Note     Name: Phong Fofana  [facility] Number: [id number]    Therapy Diagnosis:   Encounter Diagnoses   Name Primary?    Abnormality of gait due to impairment of balance Yes    S/P cervical spinal fusion        Physician: Tobi Levin DO    Visit Date: 3/23/2023    Physician Orders: PT Eval and Treat   Medical Diagnosis from Referral:   H/O cervical spine surgery  - Primary     Z98.890      Evaluation Date: 2/13/2023  Authorization Period Expiration: 2/9/2023 - 2/9/3034  Plan of Care Expiration: 4/10/2023  Visit # / Visits authorized: 10/20 (11)    PTA Visit #: 0/5     Time In: 0930  Time Out: 1015  Total Billable Time: 45 minutes     Precautions: Standard, Fall, and Post-op cervical fusion  Surgery date: 1/17/23 (Post-op week 9)   Per patient's dad, MD orders are as follows: wear brace to sleep and to eat, try to take brace off when he's sitting up, maybe off a little when he's walking.  Per [facility] Protocol weeks 8-12:   Avoid cervical loading (overhead arm resisted movements)  Avoid passive stretching of cervical spine    SUBJECTIVE     Pt reports: Patient states that he is doing good today. A little tired and states that he believes it is due to the change in the weather this morning    He was compliant with home exercise program  Response to previous treatment: no problems stated  Functional change: ongoing    Pain: 4/10  Location: neck      OBJECTIVE     Objective Measures updated at progress report unless specified.     Treatment     Garo received the treatments listed below:       Garo received therapeutic exercises to develop strength, endurance, range of motion, and flexibility for 30 minutes including:     Deep neck flexor isometrics with blood pressure cuff for biofeedback 10 x 10 second holds  AROM cervical rotation, sidebending, and flexion 1 minute each direction  Modified deadbugs with legs only, instructed to  lower one heel to mat at a time while maintaining back flat on mat and minimal chin tuck 3 x20-25 seconds  Modified deadbugs with green theraball; instructed to keep both hands on ball while alternating extending either leg; 2 x10  Shoulder rolls x10 forward and x10 backward  Seated scap retractions with no resistance with 3 second hold x20      Garo participated in gait training to improve functional mobility and safety for 15  minutes, including:    Gait training outside x10 mins with verbal cues to maintain fast pace and scan environment  Agility ladder with modified hop scotch (no jumping) placing 2 feet in each square then wide step outside of ladder; 1 lap going forward and 1 lap going backward    Patient Education and Home Exercises     Home Exercises Provided and Patient Education Provided     Education provided:   - Patient provided with verbal and demonstrative instruction for all activities performed in today's session.  -Patient was encouraged to pay attention to arm swing during gait with more relaxed shoulders.     Written Home Exercises Provided: Patient instructed to cont prior HEP.  See EMR under Patient Instructions for exercises provided during therapy sessions.    ASSESSMENT     Garo provided good participation and effort during today's session with treatment focused on cardiovascular endurance, core stabilization and cervical mobility/stability. He tolerated today's session well without reported increase in his cervical pain. He performed well with core and shoulder exercises on the mat; demonstrated difficulty with maintaining posterior pelvic tilt and cervical retraction but able to perform when cued. He performed well with gait training outside and expressed that he has been walking at home about 2 hours per day; discussed focusing on walking program as part of home exercise program and minimizing walking as part of therapy. He continues to be appropriate for skilled therapy services to  address his balance and cervical deficits.    Garo Is progressing towards his goals.   Pt prognosis is Excellent.     Pt will continue to benefit from skilled outpatient physical therapy to address the deficits listed in the problem list box on initial evaluation, provide pt/family education and to maximize pt's level of independence in the home and community environment.     Pt's spiritual, cultural and educational needs considered and pt agreeable to plan of care and goals.     Anticipated barriers to physical therapy: None    Goals:   Short Term Goals: 4 weeks   Garo will report less difficulty with moderate activities like moving a table or pushing a vacuum on her FOTO to show improvements with his function. Ongoing.  Garo will perform 6 Minute walk test to assess ambulatory endurance and establish appropriate goals. Met  Garo will perform Functional Gait Assessment to assess dynamic balance and fall risk with walking in the home and community. Met  Garo will report being able to tolerate 15 minutes of exercise and cardiovascular activity without increases in his neck pain. Met     Long Term Goals: 8 weeks   Garo will improve is FOTO limitation score to 28% to show improvements in his overall functional mobility. Ongoing  Garo will report and demonstrate understanding of proper body mechanics with squatting and lifting activities. progressing  Garo will tolerate active cervical range of motion to 25-50% of total range of motion in all planes. MET  Garo will be able to maintain a chin tuck for 10 seconds without increases in pain. progressing  Garo will report being able to tolerate 30 minutes of exercise and cardiovascular activity without increases in his neck pain. met  Garo will improve his distance on the 6MWT to 1616 to show improvements in his ambulatory endurance. (MDC for spinal cord injury = 45.8 m or ~150 feet). ongoing  Garo will improve his score on the FGA to 18/30 to show improvements  in his dynamic balance with gait activities and his fall risk. Ongoing  Garo will tolerate dynamic sitting and standing activities for 45-60 minutes without reported increase in pain. Established        PLAN     Plan of care Certification: 2/13/2023 to 4/10/2023.     Outpatient Physical Therapy 2 times weekly for 8 weeks to include the following interventions: Cervical/Lumbar Traction, Electrical Stimulation, Gait Training, Manual Therapy, Moist Heat/ Ice, Neuromuscular Re-ed, Orthotic Management and Training, Patient Education, Therapeutic Activities, and Therapeutic Exercise.     Yanna Wiggins, PT, DPT

## 2023-03-23 NOTE — PROGRESS NOTES
[facility]  Occupational Therapy Treatment Note    Date: 3/23/2023  Name: Phong Fofana  Clinic Number: [id number]    Therapy Diagnosis:   Encounter Diagnoses   Name Primary?    Decreased range of motion Yes    Decreased coordination     Decreased strength     Contracture of hand      Physician: Tobi Levin DO    Physician Orders: OT eval and treat   Medical Diagnosis:   Z98.890 (ICD-10-CM) - History of cervical spinal surgery  Evaluation Date: 2/28/2023  Progress Notes Due: 3/31/23, 4/28/23  Plan of Care Expiration Period: 5/26/2023   Insurance Authorization period Expiration: 12/31/2023  Date of Return to MD: 3/20/2023 / Dr. Levin neurosurgery   Visit # / Visits Authorized: 6 / 40 (+eval)  FOTO:         Time In: 10:34 AM  Time Out: 11:15 AM   Total Billable (one on one) Time: 41 minutes     Precautions: Standard, Fall, Seizure, and Post-op cervical fusion  Post-op week 3; Per patient's dad, MD orders are as follows: wear brace to sleep and to eat, try to take brace off when he's sitting up, maybe off a little when he's walking.  Per [facility] Protocol weeks 0-8:   1. Prevent excessive initial mobility or stress on tissues  2. Limit overhead arm movements, bending and lifting.  3. Please follow physician recommendations regarding use of collars etc. (multilevel fusions hard  collar for 6 wks; one-level fusions wear a collar as needed for a week or two)  No prone, no bridging, no lying with a pillow, no active neck movement for a few weeks.     SUBJECTIVE     Pt reports: Garo (via text to talk) stated that he is wearing his orthoses and he feels like they are helping.     He was compliant with home exercise program given last session .  Response to previous treatment: no concerns   Functional change:  no significant change compared to previous session per pt report     Pain: 0/10 at rest; 4/10 during stretching  Location:  fingers     OBJECTIVE     Objective Measures  updated at progress report unless specified.    Treatment     Garo received the treatments listed below:     Therapeutic activities to improve functional performance for 41 minutes, including:    Gentle mobilization of joints involving the left and right UE phalanges, metacarpals, carpals, radius and ulna to facilitate increases in passive movement.     Gentle stretching of the soft tissues of the;left and right  right wrist and hand to decrease edema and improve PROM, potentially allowing for increases in active movement. Extent of movement limited by pain, though not quantified.     AAROM thumb IP extension AROMx10. Blocked 4th & 5th digits DIP flexion AROM x10 each. Blocked 4th & 5th digit PIP extension AROM x10 each.     Thumb abduction (thumbs up) 2x10, decreased range of motion of thumb (right)    Thumb opposition 2x10 with flexion in IP joint of thumb (right)    Claw and intrinsic plus 2x10 (right)    -mod verbal cues for form     Opening and closing of variety of containers while using left hand to stabilize and right hand to manipulate the lids   -mod verbal cues to use digits to turn the lids instead of spin them off with palm     Patient Education and Home Exercises      Education provided:   - ed patient & step-dad re: orthotic wear/ care. Dad verbalized understanding of donning orthoses.   - will need help from family for blocking exercises.   - Progress towards goals     Written Home Exercises Provided: Patient instructed to cont prior HEP.  Exercises were reviewed and Garo was able to demonstrate them prior to the end of the session. Garo demonstrated good  understanding of the HEP provided. See EMR under Patient Instructions for exercises provided during therapy sessions.       Assessment     Pt would continue to benefit from skilled OT. Garo has been wearing his orthoses and has no concerns. Pain noted during stretches but none at the end of the session. Garo instructed to use left hand more,  at least as stabilizer or objects in palm. Will need to continue addressing blocking exercises and \low-load prolonged stretch (LLPS) for maximum potential functional return.     Garo is progressing well towards his goals and there are no updates to goals at this time. Pt prognosis is Good.     Pt will continue to benefit from skilled outpatient occupational therapy to address the deficits listed in the problem list on initial evaluation provide pt/family education and to maximize pt's level of independence in the home and community environment.     Pt's spiritual, cultural and educational needs considered and pt agreeable to plan of care and goals.    Anticipated barriers to occupational therapy:  communication; spinal precautions     Goals:  Short Term Goals: 6 weeks   1) Pt will be (I) with initial HEP (progressing 3/23/2023)   2) Pt will be (I) with manual stretching program to increase functional use of bilateral hands (progressing 3/23/2023)   3) Pt will gain 4 lbs of  strength bilaterally for increased participation during functional tasks (progressing 3/23/2023)   4) Pt will be able to open a medication bottle with Mod I 5/5 attempts (progressing 3/23/2023)   5) Cervical range of motion to be assessed once precautions lifted and goal(s) to be added as necessary (progressing 3/23/2023)   6) Pt will complete 10 minutes on the ergometer, standing, level 3.0 for increased standing/activity tolerance (once precautions are lifted)  (progressing 3/23/2023)   7). Pt will wash and dry 10 cups/dishes with mod I.  (progressing 3/23/2023)      Long Term Goals: 12 weeks   1) Mod I - cutting food (progressing 3/23/2023)   2) Mod I - buttons and fasteners (progressing 3/23/2023)   3) Pt will be able to tie his shoes, Mod I, in a timely manner. (progressing 3/23/2023)   4) Pt will increase fine motor coordination, as evidenced by a decrease of 12 seconds bilaterally during 9 Hole Peg test (progressing 3/23/2023)   5)  Pt will increase gross motor coordination, as evidenced by an increase of 10 blocks bilaterally during Box and Block. (progressing 3/23/2023)   6) Pt will self report increased ability to turn steering wheel on riding  to successfully cut the grass in one location at his residence (front or back yard). (progressing 3/23/2023)      Additional functional goals to be added as pt progresses and per his priorities.     PLAN     Certification Period/Plan of care expiration: 2/28/2023 to 5/26/2023.     Outpatient Occupational Therapy 2 times weekly for 12 weeks to include the following interventions: Manual Therapy, Neuromuscular Re-ed, Orthotic Management and Training, Patient Education, Self Care, Therapeutic Activities, and Therapeutic Exercise.     Updates/Grading for next session: review blocking exercises; upgrade orthosis as appropriate.    Fernanda Capellan OT

## 2023-03-27 ENCOUNTER — CLINICAL SUPPORT (OUTPATIENT)
Dept: REHABILITATION | Facility: HOSPITAL | Age: 33
End: 2023-03-27
Payer: MEDICAID

## 2023-03-27 DIAGNOSIS — R47.1 DYSARTHRIA: Primary | ICD-10-CM

## 2023-03-27 DIAGNOSIS — M24.549 CONTRACTURE OF HAND: ICD-10-CM

## 2023-03-27 DIAGNOSIS — R27.8 DECREASED COORDINATION: ICD-10-CM

## 2023-03-27 DIAGNOSIS — R53.1 DECREASED STRENGTH: ICD-10-CM

## 2023-03-27 DIAGNOSIS — Z98.1 S/P CERVICAL SPINAL FUSION: ICD-10-CM

## 2023-03-27 DIAGNOSIS — R26.89 ABNORMALITY OF GAIT DUE TO IMPAIRMENT OF BALANCE: Primary | ICD-10-CM

## 2023-03-27 DIAGNOSIS — M25.60 DECREASED RANGE OF MOTION: Primary | ICD-10-CM

## 2023-03-27 PROCEDURE — 97530 THERAPEUTIC ACTIVITIES: CPT | Mod: 59,PN

## 2023-03-27 PROCEDURE — 92507 TX SP LANG VOICE COMM INDIV: CPT | Mod: PN

## 2023-03-27 PROCEDURE — 97110 THERAPEUTIC EXERCISES: CPT | Mod: 59,PN

## 2023-03-27 PROCEDURE — 97112 NEUROMUSCULAR REEDUCATION: CPT | Mod: PN

## 2023-03-27 NOTE — PROGRESS NOTES
[facility]   Physical Therapy Treatment Note     Name: Phong Fofana  [facility] Number: [id number]    Therapy Diagnosis:   Encounter Diagnoses   Name Primary?    Abnormality of gait due to impairment of balance Yes    S/P cervical spinal fusion      Physician: Tobi Levin DO    Visit Date: 3/27/2023    Physician Orders: PT Eval and Treat   Medical Diagnosis from Referral:   H/O cervical spine surgery  - Primary     Z98.890      Evaluation Date: 2/13/2023  Authorization Period Expiration: 2/9/2023 - 2/9/3034  Plan of Care Expiration: 4/10/2023  Visit # / Visits authorized: 12/20 (13)    PTA Visit #: 0/5     Time In: 0930  Time Out: 1015  Total Billable Time: 45 minutes     Precautions: Standard, Fall, and Post-op cervical fusion  Surgery date: 1/17/23 (Post-op week 10)   Per patient's dad, MD orders are as follows: wear brace to sleep and to eat, try to take brace off when he's sitting up, maybe off a little when he's walking.  Per [facility] Protocol weeks 8-12:   Avoid cervical loading (overhead arm resisted movements)  Avoid passive stretching of cervical spine    SUBJECTIVE     Pt reports: Patient states that he is doing good today.     He was compliant with home exercise program  Response to previous treatment: no problems stated  Functional change: ongoing    Pain: 4/10  Location: neck      OBJECTIVE     Objective Measures updated at progress report unless specified.     Treatment     Garo received the treatments listed below:       Garo received therapeutic exercises to develop strength, endurance, range of motion, and flexibility for 38 minutes including:     SciFit Recumbent stepper level 4 x15 minutes for cardiovascular training and carryover of left hand opening from OT  Seated cervical AROM; flexion/extension, right/left rotation, and right/left side bending x1 minute each  Shoulder rolls forward and backward x20  Seated scap retraction with no resistance  x20 with 3 second holds  Deep neck flexor isometrics with blood pressure cuff for biofeedback 10 x10 second holds with VC to only increase by 10 mmHg  Modified deadbugs with green theraball; instructed to keep both hands on ball while alternating extending either leg; 2 x10  Sidelying open books within pain free ROM x10 to each side    Garo participated in neuromuscular re-education to improve functional mobility, dynamic balance, coordination and safety for 8 minutes, including:    Agility ladder with modified hop scotch (no jumping) placing 2 feet in each square then wide step outside of ladder; 1 lap going forward and 1 lap going backward x2 laps  Backwards diagonals stepping 2 feet in/out of each square and skipping every other square x 1 lap     Patient Education and Home Exercises     Home Exercises Provided and Patient Education Provided     Education provided:   - Patient provided with verbal and demonstrative instruction for all activities performed in today's session.  -Patient was encouraged to pay attention to arm swing during gait with more relaxed shoulders.     Written Home Exercises Provided: Patient instructed to cont prior HEP.  See EMR under Patient Instructions for exercises provided during therapy sessions.    ASSESSMENT     Garo provided good participation and effort during today's session with treatment focused on cardiovascular endurance, core stabilization and cervical mobility/stability. He tolerated the session well with reports of minimal increases in his pain from 4 at start of session to about 5-6 with activity. He performed well with core and cervical exercise from last session and showed fair tolerance to those added today; expressed feeling challenged by backwards ladder drills to challenge his balance and coordination. He would benefit from continued skilled therapy to address his cervical deficits, tolerance to exercise, and dynamic balance.     Garo Is progressing towards his  goals.   Pt prognosis is Excellent.     Pt will continue to benefit from skilled outpatient physical therapy to address the deficits listed in the problem list box on initial evaluation, provide pt/family education and to maximize pt's level of independence in the home and community environment.     Pt's spiritual, cultural and educational needs considered and pt agreeable to plan of care and goals.     Anticipated barriers to physical therapy: None    Goals:   Short Term Goals: 4 weeks   Garo will report less difficulty with moderate activities like moving a table or pushing a vacuum on her FOTO to show improvements with his function. Ongoing.  Garo will perform 6 Minute walk test to assess ambulatory endurance and establish appropriate goals. Met  Garo will perform Functional Gait Assessment to assess dynamic balance and fall risk with walking in the home and community. Met  Garo will report being able to tolerate 15 minutes of exercise and cardiovascular activity without increases in his neck pain. Met     Long Term Goals: 8 weeks   Garo will improve is FOTO limitation score to 28% to show improvements in his overall functional mobility. Ongoing  Garo will report and demonstrate understanding of proper body mechanics with squatting and lifting activities. progressing  Garo will tolerate active cervical range of motion to 25-50% of total range of motion in all planes. MET  Garo will be able to maintain a chin tuck for 10 seconds without increases in pain. progressing  Garo will report being able to tolerate 30 minutes of exercise and cardiovascular activity without increases in his neck pain. met  Garo will improve his distance on the 6MWT to 1616 to show improvements in his ambulatory endurance. (MDC for spinal cord injury = 45.8 m or ~150 feet). ongoing  Garo will improve his score on the FGA to 18/30 to show improvements in his dynamic balance with gait activities and his fall risk. Ongoing  Garo  will tolerate dynamic sitting and standing activities for 45-60 minutes without reported increase in pain. Established        PLAN     Plan of care Certification: 2/13/2023 to 4/10/2023.     Outpatient Physical Therapy 2 times weekly for 8 weeks to include the following interventions: Cervical/Lumbar Traction, Electrical Stimulation, Gait Training, Manual Therapy, Moist Heat/ Ice, Neuromuscular Re-ed, Orthotic Management and Training, Patient Education, Therapeutic Activities, and Therapeutic Exercise.     Yanna Wiggins, PT, DPT

## 2023-03-27 NOTE — PROGRESS NOTES
[facility]  Speech Therapy Treatment Note- Neurological Rehabilitation  Date: 3/27/2023     Name: Phong Fofana   MRN: [id number]   Therapy Diagnosis:   Encounter Diagnosis   Name Primary?    Dysarthria Yes   Physician: Tobi Levin DO  Physician Orders: Ambulatory Referral to Speech Therapy   Medical Diagnosis: s/p cervical fusion     Visit #/ Visits Authorized: 3/16  Date of Evaluation:  3/16/2023  Insurance Authorization Period: 5/20/2023  Plan of Care Expiration Date: 5/12/2023  Extended Plan of Care:  not applicable    Progress Note: due 4/16/2023   Visits Cancelled: 0  Visits No Show: 0    Time In:  8:45 AM  Time Out:  9:30 AM  Total Billable Time: 45 minutes      Precautions: Standard, Fall, Communication- utilizes application to communicate, and Status post cervical fusion   Subjective:   Patient reports: he likes the speech assistant application (free version) that is downloaded on his phone. Patient's stepdaughter also likes the tanya and has downloaded this tanya.       Patient has an appointment with Dr. Suarez this week.     Patient reports he likes to read everything: thrillers, romance, history, etc.     Patient states he walked 10 miles on Saturday.     He was compliant to home exercise program.   Response to previous treatment: first treatment    Pain Scale:  6/10 on a Visual Analog Scale currently.   Pain Location: neck   Objective:      UNTIMED  Procedure   Speech- Language- Voice Therapy   Total Timed Units: 0  Total Untimed Units: 1  Charges Billed/Number of units: 1    Short Term Goals: (4 weeks) Current Progress:   Patient will see Otolaryngology (ENT)     Progressing/ Not Met 3/27/2023   Patient scheduled to see Dr. Suarez, PCP for a doctor's note recommending AAC and make an ENT referral.    2. Patient and Speech Language Pathologist will establish a functional form of communication for the patient.     Progressing/ Not Met 3/27/2023   Speech Assistant tanya on his  "phone:  Patient independently typing sentences to express himself.  "I went to the beach." "Kind of relaxing, the one in [place] going to [place]." "Did Friday and got three catfish." "When I get red fish." "You have to be willing to try different ways" "I use honey, garlic, and lemon." "It slows my phone way down though." "My stepdaughter loves this tanya, she downloaded it too." "I have two stepdaughters, I have two sons." "I use this phone so much, soon going to need a new one."   Patient independent with creating and editing buttons.    Patient is also efficient at using text messaging on his phone to communicate.    3. Patient will approximate basic answers verbally with 20% speech intelligibility              Progressing/ Not Met 3/27/2023   Hypernasal.     Patient able to produce s, huh, uh, fff, yuh, p, k, ww given integral stimulation, articulatory placement cues, produced in unison.        4. Patient will complete a Fiberoptic Endoscopic Evaluation of the Swallow (FEES) to assess swallow function.     Progressing/ Not Met 3/27/2023   Speech Language Pathologist to request orders from Dr. Levin        Patient Education/Response:   Patient educated regarding the followin. Process of an AAC evaluation   Dr. Suarez appointment - PCP to write a recommendation for an AAC evaluation   Mom to call medicaid to see which SGD are in network for the patient   2. Use of  AAC  3. Referrals and follow ups needed  4. Programming Speech Assistant application.   5. Voiced and voiceless phonemes  6. Importance of using integral stimulation and producing in unison with another for increased success with imitating sounds verbally.   Patient verbalized understanding to all above education provided.     Home program established: yes-Encouraged to use the speech assistant tanya to communicate. Provided speech sound cue cards to target patient's ability to produce consonant and vowel phonemes. Encouraged to practice phonemes " with someone else providing a mode.   Exercises were reviewed and Garo was able to demonstrate them prior to the end of the session.  Garo demonstrated good  understanding of the education provided.     See Electronic Medical Record under Patient Instructions for exercises provided throughout therapy.  Assessment:   Garo is progressing well towards his goals. Patient with excellent cooperation. Patient with fairly intact receptive skills. Patient exhibiting good ability to edit and function Speech Assistant tanya for an AAC device. Patient independently participating in conversation appropriately and formulating sentences independently using the AAC tanya. Patient reports using the AAC tanya at home. The free version of this tanya is limiting though and patient would benefit from receiving a personal speech generating device. Patient with hypernasality and limited verbalizations. Patient exhibited greater ability to produce voiceless phonemes than voiced phonemes likely indicating impaired vocal folds. Therapy planning provided to patient regarding Otolaryngology (ENT) referrals, and Fiberoptic Endoscopic Evaluation of the Swallow (FEES) order request. Current goals remain appropriate. Goals to be updated as necessary.     Patient prognosis is Good. Patient will continue to benefit from skilled outpatient speech and language therapy to address the deficits listed in the problem list on initial evaluation, provide patient/family education and to maximize patient's level of independence in the home and community environment.   Medical necessity is demonstrated by the following IMPAIRMENTS:  Decreased speech intelligibility results in decreased efficiency communicating medical and social wants and needs in the case of emergency.   Barriers to Therapy: none identified   Patient's spiritual, cultural and educational needs considered and patient agreeable to plan of care and goals.  Plan:   Continue Plan of Care with focus on  establishing a functional and long term functional communication system.       Mallory Zuniga CCC-SLP   3/27/2023

## 2023-03-27 NOTE — PROGRESS NOTES
[facility]  Occupational Therapy Treatment Note    Date: 3/27/2023  Name: Phong Fofana  Clinic Number: [id number]    Therapy Diagnosis:   Encounter Diagnoses   Name Primary?    Decreased range of motion Yes    Decreased coordination     Decreased strength     Contracture of hand      Physician: Tobi Levin DO    Physician Orders: OT eval and treat   Medical Diagnosis:   Z98.890 (ICD-10-CM) - History of cervical spinal surgery  Evaluation Date: 2/28/2023  Progress Notes Due: 3/31/23, 4/28/23  Plan of Care Expiration Period: 5/26/2023   Insurance Authorization period Expiration: 12/31/2023  Date of Return to MD: 3/20/2023 / Dr. Levin neurosurgery   Visit # / Visits Authorized: 7 / 40 (+eval)  FOTO:         Time In: 8:14 AM  Time Out: 8:45 AM   Total Billable (one on one) Time:  31 minutes (pt arrived late)      Precautions: Standard, Fall, Seizure, and Post-op cervical fusion  Post-op week 3; Per patient's dad, MD orders are as follows: wear brace to sleep and to eat, try to take brace off when he's sitting up, maybe off a little when he's walking.  Per [facility] Protocol weeks 0-8:   1. Prevent excessive initial mobility or stress on tissues  2. Limit overhead arm movements, bending and lifting.  3. Please follow physician recommendations regarding use of collars etc. (multilevel fusions hard  collar for 6 wks; one-level fusions wear a collar as needed for a week or two)  No prone, no bridging, no lying with a pillow, no active neck movement for a few weeks.     SUBJECTIVE     Pt reports: Garo (via text to talk) stated that his kids went their grandparents house. He reported that he can feel the storm in his hands and his neck.     He was compliant with home exercise program given last session .  Response to previous treatment: no concerns   Functional change:  no significant change compared to previous session per pt report     Pain: 0/10 at rest; 4/10 during  stretching  Location:  fingers     OBJECTIVE     Objective Measures updated at progress report unless specified.    Treatment     Garo received the treatments listed below:     Therapeutic activities to improve functional performance for 31 minutes, including:    Gentle mobilization of joints involving the left and right UE phalanges, metacarpals, carpals, radius and ulna to facilitate increases in passive movement.     Gentle stretching of the soft tissues of the;left and right wrist and hand to decrease edema and improve PROM, potentially allowing for increases in active movement. Extent of movement limited by pain, though not quantified.     Thumb abduction (thumbs up) 2x10, decreased range of motion of thumb (right)    Thumb opposition 2x10 with flexion in IP joint of thumb (right)    Claw and intrinsic plus 2x10 (right)    -difficulty with DIP flexion     Coordination boards    -Large button x2 / used left hand for lateral pinch on material to stabilize while right hand buttoned   -Small button / 4 buttons out of 5 - difficulty with last button due to non stretch material     -Tie / mod difficulty manipulating the strings with left hand - able to tie, but with significantly increased time     Patient Education and Home Exercises      Education provided:   - ed patient & step-dad re: orthotic wear/ care. Dad verbalized understanding of donning orthoses.   - will need help from family for blocking exercises.   - Progress towards goals     Written Home Exercises Provided: Patient instructed to cont prior HEP.  Exercises were reviewed and Garo was able to demonstrate them prior to the end of the session. Garo demonstrated good  understanding of the HEP provided. See EMR under Patient Instructions for exercises provided during therapy sessions.       Assessment     Pt would continue to benefit from skilled OT. Garo has been wearing his orthoses and has no concerns. Pain noted during stretches but none at the end  of the session. Garo completed stretches and other range of motion activities during his session to attempt to decrease further contracture on bilateral hands. Coordination activities given today with mod difficulty using left hand as a stabilizer and in conjunction with right hand. Will need to continue addressing range of motion exercises and \low-load prolonged stretch (LLPS) for maximum potential functional return.     Garo is progressing well towards his goals and there are no updates to goals at this time. Pt prognosis is Good.     Pt will continue to benefit from skilled outpatient occupational therapy to address the deficits listed in the problem list on initial evaluation provide pt/family education and to maximize pt's level of independence in the home and community environment.     Pt's spiritual, cultural and educational needs considered and pt agreeable to plan of care and goals.    Anticipated barriers to occupational therapy:  communication; spinal precautions     Goals:  Short Term Goals: 6 weeks   1) Pt will be (I) with initial HEP (progressing 3/27/2023)   2) Pt will be (I) with manual stretching program to increase functional use of bilateral hands (progressing 3/27/2023)   3) Pt will gain 4 lbs of  strength bilaterally for increased participation during functional tasks (progressing 3/27/2023)   4) Pt will be able to open a medication bottle with Mod I 5/5 attempts (progressing 3/27/2023)   5) Cervical range of motion to be assessed once precautions lifted and goal(s) to be added as necessary (progressing 3/27/2023)   6) Pt will complete 10 minutes on the ergometer, standing, level 3.0 for increased standing/activity tolerance (once precautions are lifted)  (progressing 3/27/2023)   7). Pt will wash and dry 10 cups/dishes with mod I.  (progressing 3/27/2023)      Long Term Goals: 12 weeks   1) Mod I - cutting food (progressing 3/27/2023)   2) Mod I - buttons and fasteners (progressing  3/27/2023)   3) Pt will be able to tie his shoes, Mod I, in a timely manner. (progressing 3/27/2023)   4) Pt will increase fine motor coordination, as evidenced by a decrease of 12 seconds bilaterally during 9 Hole Peg test (progressing 3/27/2023)   5) Pt will increase gross motor coordination, as evidenced by an increase of 10 blocks bilaterally during Box and Block. (progressing 3/27/2023)   6) Pt will self report increased ability to turn steering wheel on riding  to successfully cut the grass in one location at his residence (front or back yard). (progressing 3/27/2023)      Additional functional goals to be added as pt progresses and per his priorities.     PLAN     Certification Period/Plan of care expiration: 2/28/2023 to 5/26/2023.     Outpatient Occupational Therapy 2 times weekly for 12 weeks to include the following interventions: Manual Therapy, Neuromuscular Re-ed, Orthotic Management and Training, Patient Education, Self Care, Therapeutic Activities, and Therapeutic Exercise.     Updates/Grading for next session: review blocking exercises; upgrade orthosis as appropriate.    Fernanda Capellan, OT

## 2023-03-29 ENCOUNTER — PATIENT MESSAGE (OUTPATIENT)
Dept: REHABILITATION | Facility: HOSPITAL | Age: 33
End: 2023-03-29

## 2023-03-29 ENCOUNTER — CLINICAL SUPPORT (OUTPATIENT)
Dept: REHABILITATION | Facility: HOSPITAL | Age: 33
End: 2023-03-29
Payer: MEDICAID

## 2023-03-29 DIAGNOSIS — Z98.1 S/P CERVICAL SPINAL FUSION: ICD-10-CM

## 2023-03-29 DIAGNOSIS — R53.1 DECREASED STRENGTH: ICD-10-CM

## 2023-03-29 DIAGNOSIS — M24.549 CONTRACTURE OF HAND: ICD-10-CM

## 2023-03-29 DIAGNOSIS — M25.60 DECREASED RANGE OF MOTION: Primary | ICD-10-CM

## 2023-03-29 DIAGNOSIS — R26.89 ABNORMALITY OF GAIT DUE TO IMPAIRMENT OF BALANCE: Primary | ICD-10-CM

## 2023-03-29 DIAGNOSIS — R47.1 DYSARTHRIA: Primary | ICD-10-CM

## 2023-03-29 DIAGNOSIS — R27.8 DECREASED COORDINATION: ICD-10-CM

## 2023-03-29 PROCEDURE — 92507 TX SP LANG VOICE COMM INDIV: CPT | Mod: PN

## 2023-03-29 PROCEDURE — 97530 THERAPEUTIC ACTIVITIES: CPT | Mod: PN

## 2023-03-29 PROCEDURE — 97110 THERAPEUTIC EXERCISES: CPT | Mod: 59,PN,CQ

## 2023-03-29 NOTE — PROGRESS NOTES
[facility]   Physical Therapy Treatment Note     Name: Phong Fofana  Clinic Number: [id number]    Therapy Diagnosis:   Encounter Diagnoses   Name Primary?    Abnormality of gait due to impairment of balance Yes    S/P cervical spinal fusion      Physician: Tobi Levin DO    Visit Date: 3/29/2023    Physician Orders: PT Eval and Treat   Medical Diagnosis from Referral:   H/O cervical spine surgery  - Primary     Z98.890      Evaluation Date: 2/13/2023  Authorization Period Expiration: 2/9/2023 - 2/9/3034  Plan of Care Expiration: 4/10/2023  Visit # / Visits authorized: 13/20 (14)    PTA Visit #: 1/5     Time In: 1615  Time Out: 1700  Total Billable Time: 45 minutes     Precautions: Standard, Fall, and Post-op cervical fusion  Surgery date: 1/17/23 (Post-op week 10)   Per patient's dad, MD orders are as follows: wear brace to sleep and to eat, try to take brace off when he's sitting up, maybe off a little when he's walking.  Per [facility] Protocol weeks 8-12:   Avoid cervical loading (overhead arm resisted movements)  Avoid passive stretching of cervical spine    SUBJECTIVE     Pt reports: Patient nodding head yes to questions.  He was compliant with home exercise program  Response to previous treatment: no problems stated  Functional change: ongoing    Pain: 6/10  Location: neck      OBJECTIVE     Objective Measures updated at progress report unless specified.     Treatment     Garo received the treatments listed below:       Garo received therapeutic exercises to develop strength, endurance, range of motion, and flexibility for 45 minutes including:     Recumbent bike Level 4.5 15 minutes    Sit:  Shoulder shrugs 20 times  Scapular retraction 20 times  Retro shoulder rolls 20 times  Horizontal abduction with orange Theraband 20 times    Seated Cervical range of motion 2 sets of 10: rotation, side bend, chin tuck    Shuttle:  56# Bilateral leg press 5 minutes  56#  Bilateral heel raises/heel dips 2 sets of 10   37# Unilateral leg press 1 minutes Right Left     Patient Education and Home Exercises     Home Exercises Provided and Patient Education Provided     Education provided:   - Patient provided with verbal and demonstrative instruction for all activities performed in today's session.   - gentle Cervical range of motion in pain free range    Written Home Exercises Provided: Patient instructed to cont prior HEP.  See EMR under Patient Instructions for exercises provided during therapy sessions.    ASSESSMENT     Garo provided good participation and effort during today's session with treatment focused on lower extremity strengthening/ endurance, upper extremity range of motion and cervical range of motion. Garo needing verbal cues to decrease range with noticeable facial grimacing during Cervical rotation and side bend.    Garo Is progressing towards his goals.   Pt prognosis is Excellent.     Pt will continue to benefit from skilled outpatient physical therapy to address the deficits listed in the problem list box on initial evaluation, provide pt/family education and to maximize pt's level of independence in the home and community environment.     Pt's spiritual, cultural and educational needs considered and pt agreeable to plan of care and goals.     Anticipated barriers to physical therapy: None    Goals:   Short Term Goals: 4 weeks   Garo will report less difficulty with moderate activities like moving a table or pushing a vacuum on her FOTO to show improvements with his function. Ongoing.  Garo will perform 6 Minute walk test to assess ambulatory endurance and establish appropriate goals. Met  Garo will perform Functional Gait Assessment to assess dynamic balance and fall risk with walking in the home and community. Met  Garo will report being able to tolerate 15 minutes of exercise and cardiovascular activity without increases in his neck pain. Met     Long Term  Goals: 8 weeks   Garo will improve is FOTO limitation score to 28% to show improvements in his overall functional mobility. Ongoing  Garo will report and demonstrate understanding of proper body mechanics with squatting and lifting activities. progressing  Garo will tolerate active cervical range of motion to 25-50% of total range of motion in all planes. MET  Garo will be able to maintain a chin tuck for 10 seconds without increases in pain. progressing  Garo will report being able to tolerate 30 minutes of exercise and cardiovascular activity without increases in his neck pain. met  Garo will improve his distance on the 6MWT to 1616 to show improvements in his ambulatory endurance. (MDC for spinal cord injury = 45.8 m or ~150 feet). ongoing  Garo will improve his score on the FGA to 18/30 to show improvements in his dynamic balance with gait activities and his fall risk. Ongoing  Garo will tolerate dynamic sitting and standing activities for 45-60 minutes without reported increase in pain. ongoing    PLAN     Plan of care Certification: 2/13/2023 to 4/10/2023.     Outpatient Physical Therapy 2 times weekly for 8 weeks to include the following interventions: Cervical/Lumbar Traction, Electrical Stimulation, Gait Training, Manual Therapy, Moist Heat/ Ice, Neuromuscular Re-ed, Orthotic Management and Training, Patient Education, Therapeutic Activities, and Therapeutic Exercise.     Dorothea Carver, PTA

## 2023-03-29 NOTE — PROGRESS NOTES
[facility]  Occupational Therapy Treatment Note    Date: 3/29/2023  Name: Phong Fofana  Clinic Number: [id number]    Therapy Diagnosis:   Encounter Diagnoses   Name Primary?    Decreased range of motion Yes    Decreased coordination     Decreased strength     Contracture of hand      Physician: Tobi Levin DO    Physician Orders: OT eval and treat   Medical Diagnosis:   Z98.890 (ICD-10-CM) - History of cervical spinal surgery  Evaluation Date: 2/28/2023  Progress Notes Due: 3/31/23, 4/28/23  Plan of Care Expiration Period: 5/26/2023   Insurance Authorization period Expiration: 12/31/2023  Date of Return to MD: 3/20/2023 / Dr. Levin neurosurgery   Visit # / Visits Authorized: 8 / 40 (+eval)  FOTO:         Time In: 5:03 PM  Time Out: 5:45 PM    Total Billable (one on one) Time: 42 minutes     Precautions: Standard, Fall, Seizure, and Post-op cervical fusion  Post-op week 3; Per patient's dad, MD orders are as follows: wear brace to sleep and to eat, try to take brace off when he's sitting up, maybe off a little when he's walking.  Per [facility] Protocol weeks 0-8:   1. Prevent excessive initial mobility or stress on tissues  2. Limit overhead arm movements, bending and lifting.  3. Please follow physician recommendations regarding use of collars etc. (multilevel fusions hard  collar for 6 wks; one-level fusions wear a collar as needed for a week or two)  No prone, no bridging, no lying with a pillow, no active neck movement for a few weeks.     SUBJECTIVE     Pt reports: Garo reported that he was able to get a really good night's sleep last night. He reported that his mom's dog toted off his right hand foam tubing orthosis.     He was compliant with home exercise program given last session .  Response to previous treatment: no concerns   Functional change:  no significant change compared to previous session per pt report     Pain: 0/10 at rest; 3/10 during  stretching  Location: digits on both hands     OBJECTIVE     Objective Measures updated at progress report unless specified.    Treatment     Garo received the treatments listed below:     Therapeutic activities to improve functional performance for 42 minutes, including:    Gentle mobilization of joints involving the left and right UE phalanges, metacarpals, carpals, radius and ulna to facilitate increases in passive movement.     Gentle stretching of the soft tissues of the;left and right wrist and hand to decrease edema and improve PROM, potentially allowing for increases in active movement. Extent of movement limited by pain, though not quantified.     Blocked active range of motion of DIP's and PIP's of right upper extremity 10x     Blocked active extension of IP thumb 2x10     Thumb abduction (thumbs up) 2x10, decreased range of motion of thumb (right)    Thumb opposition 2x10 with flexion in IP joint of thumb (right)    Claw and intrinsic plus 2x10 (right)    -difficulty with DIP flexion     While reaching overhead, Garo participated in holding container with left upper extremity (held against body) and using right upper extremity to place Squigz around the top of the mirror. He then was to wrap his digits around the Squigz and  pull them off. He was able to place the entire container on the mirror + take them off. He completed this activity 2x. He reported no pain with overhead reaching.     A piece of foam tubing was given to Garo to use in place of his lost orthosis until it is found. He states he might know where the dog took it. No straps were placed on the tubing this time. It was only shaved down to better fit his hand.     Patient Education and Home Exercises      Education provided:   - ed patient & step-dad re: orthotic wear/ care. Dad verbalized understanding of donning orthoses.   - will need help from family for blocking exercises.   - Progress towards goals     Written Home Exercises  Provided: Patient instructed to cont prior HEP.  Exercises were reviewed and Garo was able to demonstrate them prior to the end of the session. Garo demonstrated good  understanding of the HEP provided. See EMR under Patient Instructions for exercises provided during therapy sessions.       Assessment     Pt would continue to benefit from skilled OT.  Pain noted during stretches but none at the end of the session. Garo completed stretches and other range of motion tasks during his session to attempt to decrease further contracture on bilateral hands. He was given new foam tubing for nighttime use until he finds his made orthosis. He completed overhead reaching activity with no pain reported in neck.  He was able to use his right hand (during overhead activity) to take Squigz off of the mirror by wrapping his digits around them and using his flexed thumb to pinch. Will need to continue addressing range of motion exercises and low-load prolonged stretch (LLPS) for maximum potential functional return.     Garo is progressing well towards his goals and there are no updates to goals at this time. Pt prognosis is Good.     Pt will continue to benefit from skilled outpatient occupational therapy to address the deficits listed in the problem list on initial evaluation provide pt/family education and to maximize pt's level of independence in the home and community environment.     Pt's spiritual, cultural and educational needs considered and pt agreeable to plan of care and goals.    Anticipated barriers to occupational therapy:  communication; spinal precautions     Goals:  Short Term Goals: 6 weeks   1) Pt will be (I) with initial HEP (progressing 3/29/2023)   2) Pt will be (I) with manual stretching program to increase functional use of bilateral hands (progressing 3/29/2023)   3) Pt will gain 4 lbs of  strength bilaterally for increased participation during functional tasks (progressing 3/29/2023)   4) Pt will be  able to open a medication bottle with Mod I 5/5 attempts (progressing 3/29/2023)   5) Cervical range of motion to be assessed once precautions lifted and goal(s) to be added as necessary (progressing 3/29/2023)   6) Pt will complete 10 minutes on the ergometer, standing, level 3.0 for increased standing/activity tolerance (once precautions are lifted)  (progressing 3/29/2023)   7). Pt will wash and dry 10 cups/dishes with mod I.  (progressing 3/29/2023)      Long Term Goals: 12 weeks   1) Mod I - cutting food (progressing 3/29/2023)   2) Mod I - buttons and fasteners (progressing 3/29/2023)   3) Pt will be able to tie his shoes, Mod I, in a timely manner. (progressing 3/29/2023)   4) Pt will increase fine motor coordination, as evidenced by a decrease of 12 seconds bilaterally during 9 Hole Peg test (progressing 3/29/2023)   5) Pt will increase gross motor coordination, as evidenced by an increase of 10 blocks bilaterally during Box and Block. (progressing 3/29/2023)   6) Pt will self report increased ability to turn steering wheel on riding  to successfully cut the grass in one location at his residence (front or back yard). (progressing 3/29/2023)      Additional functional goals to be added as pt progresses and per his priorities.     PLAN     Certification Period/Plan of care expiration: 2/28/2023 to 5/26/2023.     Outpatient Occupational Therapy 2 times weekly for 12 weeks to include the following interventions: Manual Therapy, Neuromuscular Re-ed, Orthotic Management and Training, Patient Education, Self Care, Therapeutic Activities, and Therapeutic Exercise.     Updates/Grading for next session: upgrade orthosis as appropriate.    Fernanda Capellan, OT

## 2023-03-29 NOTE — PROGRESS NOTES
[facility]  Speech Therapy Treatment Note- Neurological Rehabilitation  Date: 3/29/2023     Name: Phong Fofana   MRN: [id number]   Therapy Diagnosis:   Encounter Diagnosis   Name Primary?    Dysarthria Yes   Physician: Tobi Levin DO  Physician Orders: Ambulatory Referral to Speech Therapy   Medical Diagnosis: s/p cervical fusion     Visit #/ Visits Authorized: 4/16  Date of Evaluation: 3/16/2023  Insurance Authorization Period: 5/20/2023  Plan of Care Expiration Date: 5/12/2023  Extended Plan of Care:  not applicable    Progress Note: due 4/16/2023   Visits Cancelled: 0  Visits No Show: 0    Time In:  3:30 PM  Time Out:  4:15 PM  Total Billable Time: 45 minutes      Precautions: Standard, Fall, Communication- utilizes application to communicate, and Status post cervical fusion   Subjective:   Patient reports: he likes the speech assistant application (free version) that is downloaded on his phone. Patient's stepdaughter also likes the tanya and has downloaded this tanya.       Via phone call to father: Father reports he had an appointment with Dr. Suarez. An ENT referral was placed for Garo and an ENT appointment was scheduled for May 9th.  Clinician informed father the patient needs a statement included in a face-to-face visit about a recommendation for AAC and an order for AAC evaluation. Clinician provided a letter to Dr. Suarez with the instructions.     He was compliant to home exercise program.   Response to previous treatment: tolerating well  Pain Scale:  6/10 on a Visual Analog Scale currently.   Pain Location: neck   Objective:      UNTIMED  Procedure   Speech- Language- Voice Therapy   Total Timed Units: 0  Total Untimed Units: 1  Charges Billed/Number of units: 1    Short Term Goals: (4 weeks) Current Progress:   Patient will see Otolaryngology (ENT)     Progressing/ Not Met 3/29/2023   Referral placed. Appointment on May 9th.   2. Patient and Speech Language Pathologist will  "establish a functional form of communication for the patient.     Progressing/ Not Met 3/29/2023   Speech Assistant tanya on his phone:  Patient independently typing sentences to express himself.  Patient independent with creating and editing buttons.  Example utterances: "Have hard time with breathing through my nose." "It's like it all has a mind of it's own."  Patient is also efficient at using text messaging on his phone to communicate.     Met 1/3    3. Patient will approximate basic answers verbally with 20% speech intelligibility            Progressing/ Not Met 3/29/2023   Hypernasal.     Patient able to produce the following phonemes given integral stimulation, articulatory placement cues, produced in unison with clinician:  Voiceless phonemes:  /p/ x3  /t/ x0  /k/ x0  /s/ x0  /f/ x1    /w/ x0  /m/ x3  /y/ partial   4. Patient will complete a Fiberoptic Endoscopic Evaluation of the Swallow (FEES) to assess swallow function.     Progressing/ Not Met 3/29/2023   Speech Language Pathologist working on requesting orders from Dr. Levin.     Patient Education/Response:   Patient educated regarding the followin. Process of an AAC evaluation   Dr. Suarez appointment - PCP to write a recommendation for an AAC evaluation   Mom to call medicaid to see which SGD are in network for the patient   2. Use of  AAC  3. Referrals and follow ups needed  4. Programming Speech Assistant application.   5. Voiced and voiceless phonemes  6. Importance of using integral stimulation and producing in unison with another for increased success with imitating sounds verbally.   Patient verbalized understanding to all above education provided.     Home program established: yes-Encouraged to use the speech assistant tanya to communicate. Provided speech sound cue cards to target patient's ability to produce consonant and vowel phonemes. Encouraged to practice phonemes with someone else providing a mode. Also provided a letter to Dr. Suarez with " "instructions for what we need to move forward with completing an AAC evaluation.   Exercises were reviewed and Garo was able to demonstrate them prior to the end of the session.  Garo demonstrated good  understanding of the education provided.     See Electronic Medical Record under Patient Instructions for exercises provided throughout therapy.  Assessment:   Garo is progressing well towards his goals. Patient with excellent cooperation. Patient with fairly intact receptive skills. Patient exhibiting good ability to edit and function Speech Assistant tanya for an AAC device. Patient independently participating in conversation appropriately and formulating sentences independently using the AAC tanya. Patient reports good carryover of using the AAC tanya at home. The free version of this tanya is limiting though and patient would benefit from receiving a personal speech generating device. Patient with limited breath support and coordination. Patient able to inhale and exhale via nasally. However deficits with exhaling orally. Patient was stimulable for exhaling via oral cavity when completing in unison with clinician and instructions to "blow out the candles." Very short burst of air. Patient with hypernasality and limited verbalizations. Patient exhibited greater ability to produce voiceless phonemes than voiced phonemes. Patient able to produce p, m, f in comparison to producing s, huh, uh, fff, yuh, p, k, ww in the previous session. Therapy planning provided to patient regarding Otolaryngology (ENT) referrals, and Fiberoptic Endoscopic Evaluation of the Swallow (FEES) order request. Current goals remain appropriate. Goals to be updated as necessary.     Patient prognosis is Good. Patient will continue to benefit from skilled outpatient speech and language therapy to address the deficits listed in the problem list on initial evaluation, provide patient/family education and to maximize patient's level of independence in " the home and community environment.   Medical necessity is demonstrated by the following IMPAIRMENTS:  Decreased speech intelligibility results in decreased efficiency communicating medical and social wants and needs in the case of emergency.   Barriers to Therapy: none identified   Patient's spiritual, cultural and educational needs considered and patient agreeable to plan of care and goals.  Plan:   Continue Plan of Care 1x/week with focus on establishing a functional and long term functional communication system.       Mallory Zuniga CCC-SLP   3/29/2023

## 2023-04-05 ENCOUNTER — CLINICAL SUPPORT (OUTPATIENT)
Dept: REHABILITATION | Facility: HOSPITAL | Age: 33
End: 2023-04-05
Payer: MEDICAID

## 2023-04-05 DIAGNOSIS — R53.1 DECREASED STRENGTH: ICD-10-CM

## 2023-04-05 DIAGNOSIS — R26.89 ABNORMALITY OF GAIT DUE TO IMPAIRMENT OF BALANCE: Primary | ICD-10-CM

## 2023-04-05 DIAGNOSIS — R27.8 DECREASED COORDINATION: ICD-10-CM

## 2023-04-05 DIAGNOSIS — Z98.1 S/P CERVICAL SPINAL FUSION: ICD-10-CM

## 2023-04-05 DIAGNOSIS — M25.60 DECREASED RANGE OF MOTION: Primary | ICD-10-CM

## 2023-04-05 DIAGNOSIS — M24.549 CONTRACTURE OF HAND: ICD-10-CM

## 2023-04-05 PROCEDURE — 97530 THERAPEUTIC ACTIVITIES: CPT | Mod: PN

## 2023-04-05 PROCEDURE — 97110 THERAPEUTIC EXERCISES: CPT | Mod: PN

## 2023-04-05 NOTE — PROGRESS NOTES
"[facility]   Physical Therapy Treatment Note     Name: Phong Fofana  Clinic Number: [id number]    Therapy Diagnosis:   Encounter Diagnoses   Name Primary?    Abnormality of gait due to impairment of balance Yes    S/P cervical spinal fusion      Physician: Tobi Levin DO    Visit Date: 4/5/2023    Physician Orders: PT Eval and Treat   Medical Diagnosis from Referral:   H/O cervical spine surgery  - Primary     Z98.890      Evaluation Date: 2/13/2023  Authorization Period Expiration: 2/9/2023 - 2/9/3034  Plan of Care Expiration: 4/10/2023  Visit # / Visits authorized: 14/20 (15)    PTA Visit #: 0/5     Time In: 1430  Time Out: 1515  Total Billable Time: 45 minutes     Precautions: Standard, Fall, and Post-op cervical fusion  Surgery date: 1/17/23 (Post-op week 11)   Per patient's dad, MD orders are as follows: wear brace to sleep and to eat, try to take brace off when he's sitting up, maybe off a little when he's walking.  Per [facility] Protocol weeks 8-12:   Avoid cervical loading (overhead arm resisted movements)  Avoid passive stretching of cervical spine    SUBJECTIVE     Pt reports: Indicates that he is doing okay today and that his neck is feeling "so-so"    He was compliant with home exercise program  Response to previous treatment: no problems stated  Functional change: ongoing    Pain: 4/10  Location: neck      OBJECTIVE     Objective Measures updated at progress report unless specified.     Treatment     Garo received the treatments listed below:       Garo received therapeutic exercises to develop strength, endurance, range of motion, core stability, and flexibility for 45 minutes including:     SciFit recumbent bike level 4.5 x15 minutes  Seated cervical range of motion x1 minute in each direction (flex/ext, left/right rotation, and sidebending)  Supine deep neck flexor isometrics with blood pressure cuff for biofeedback 10 x10 second holds with 10 mmHg " increases  Open books x15 each side  Cat/camels 2x10 with closed fists  Planks 3 x20 seconds  Single leg stance while reaching for blaze pods 2 x30 seconds on each leg  Standing in decline on foam wedge and stepping on blaze pods x30 seconds      Patient Education and Home Exercises     Home Exercises Provided and Patient Education Provided     Education provided:   - Patient provided with verbal and demonstrative instruction for all activities performed in today's session.   - gentle Cervical range of motion in pain free range    Written Home Exercises Provided: Patient instructed to cont prior HEP.  See EMR under Patient Instructions for exercises provided during therapy sessions.    ASSESSMENT     Garo provided good participation and effort during today's session with treatment focused on lower extremity strengthening/endurance, upper thoracic range of motion and cervical range of motion. He reported a minimal increase in the pain in his neck from 4 to 6/10 with cat/camels and planks. Despite this, he was still able to participate in the session well and showed good form with all activities. He continues to be appropriate for skilled physical therapy interventions to maximize his recovery s/p cervical fusion and improve his dynamic standing balance.    Garo Is progressing towards his goals.   Pt prognosis is Excellent.     Pt will continue to benefit from skilled outpatient physical therapy to address the deficits listed in the problem list box on initial evaluation, provide pt/family education and to maximize pt's level of independence in the home and community environment.     Pt's spiritual, cultural and educational needs considered and pt agreeable to plan of care and goals.     Anticipated barriers to physical therapy: None    Goals:   Short Term Goals: 4 weeks   Garo will report less difficulty with moderate activities like moving a table or pushing a vacuum on her FOTO to show improvements with his  function. Ongoing.  Garo will perform 6 Minute walk test to assess ambulatory endurance and establish appropriate goals. Met  Garo will perform Functional Gait Assessment to assess dynamic balance and fall risk with walking in the home and community. Met  Garo will report being able to tolerate 15 minutes of exercise and cardiovascular activity without increases in his neck pain. Met     Long Term Goals: 8 weeks   Garo will improve is FOTO limitation score to 28% to show improvements in his overall functional mobility. Ongoing  Garo will report and demonstrate understanding of proper body mechanics with squatting and lifting activities. progressing  Garo will tolerate active cervical range of motion to 25-50% of total range of motion in all planes. MET  Garo will be able to maintain a chin tuck for 10 seconds without increases in pain. progressing  Garo will report being able to tolerate 30 minutes of exercise and cardiovascular activity without increases in his neck pain. met  Garo will improve his distance on the 6MWT to 1616 to show improvements in his ambulatory endurance. (MDC for spinal cord injury = 45.8 m or ~150 feet). ongoing  Garo will improve his score on the FGA to 18/30 to show improvements in his dynamic balance with gait activities and his fall risk. Ongoing  Garo will tolerate dynamic sitting and standing activities for 45-60 minutes without reported increase in pain. ongoing    PLAN     Plan of care Certification: 2/13/2023 to 4/10/2023.     Outpatient Physical Therapy 2 times weekly for 8 weeks to include the following interventions: Cervical/Lumbar Traction, Electrical Stimulation, Gait Training, Manual Therapy, Moist Heat/ Ice, Neuromuscular Re-ed, Orthotic Management and Training, Patient Education, Therapeutic Activities, and Therapeutic Exercise.     Recommendations for next session: progress thoracic mobility, core stability, scapular mobility, and dynamic standing  balance    Yanna Wiggins, PT, DPT

## 2023-04-05 NOTE — PROGRESS NOTES
[facility]  Occupational Therapy Treatment Note and Progress note     Date: 4/5/2023  Name: Phong Fofana  Clinic Number: [id number]    Therapy Diagnosis:   Encounter Diagnoses   Name Primary?    Decreased range of motion Yes    Decreased coordination     Decreased strength     Contracture of hand      Physician: Tobi Levin DO    Physician Orders: OT eval and treat   Medical Diagnosis:   Z98.890 (ICD-10-CM) - History of cervical spinal surgery  Evaluation Date: 2/28/2023  Progress Notes Due: 3/31/23, 4/28/23  Plan of Care Expiration Period: 5/26/2023   Insurance Authorization period Expiration: 12/31/2023  Date of Return to MD: 3/20/2023 / Dr. Levin neurosurgery   Visit # / Visits Authorized: 10 / 40 (+eval)  FOTO: 50/100 on intake   56/100 on 4/5/2023    Time In: 1:50 PM  Time Out: 2:30 PM    Total Billable (one on one) Time: 40 minutes     Precautions: Standard, Fall, Seizure, and Post-op cervical fusion  Post-op week 3; Per patient's dad, MD orders are as follows: wear brace to sleep and to eat, try to take brace off when he's sitting up, maybe off a little when he's walking.  Per [place] [facility] Protocol weeks 0-8:   1. Prevent excessive initial mobility or stress on tissues  2. Limit overhead arm movements, bending and lifting.  3. Please follow physician recommendations regarding use of collars etc. (multilevel fusions hard  collar for 6 wks; one-level fusions wear a collar as needed for a week or two)  No prone, no bridging, no lying with a pillow, no active neck movement for a few weeks.     SUBJECTIVE     Pt reports: Garo reported he found the orthosis that his dog had taken. It's not damaged. He stated that he can only get an appointment to see the hand specialist in late May.     He was compliant with home exercise program given last session .  Response to previous treatment: no concerns   Functional change:  no significant change compared to previous session  per pt report     Pain: 6 /10   Location: digits on both hands     OBJECTIVE     Objective Measures updated at progress report unless specified.    Hand Strength (DREW Dynamometer, Setting II)   (lbs) Left   Right   1 13 45   2 13 45   3 10 42   Avg 4/5/2023 12 44   Avg 2/28/2023 13.3 53.6   [norms for men aged 30-34: R=121.8 +/-22.4; L=110.4 +/-21.7 (Mathozzywetz et al, 1985)]     Pinch Left   4/5/2023 Right   4/5/2023 Left  2/28/2023  Right  2/28/2023    Lateral 10 16 5  16   Tip (2 point) 7 13 4 13   3 jaw dennis 9 17 8 15   Fingers not positioned correctly due to thumb contracture      Gross motor coordination:      GISSELLE (Rapid Alternating Movements): intact  Finger to Nose (5 times): intact   Finger Flicks (coordination moving from digit flexion to digit extension): impaired      Box and Block Assessment Left Right   4/5/2023 23 53   2/28/2023 22 47   [norms for men aged 30-34: R=81.9 +/-9.0; L=81.3 +/-8.1 (Mathiowetz et al, 1985)]     Fine motor coordination:   -   Thumb to Finger Opposition: intact right hand, impaired left hand       9 Hole Peg Test (9HPT) Left Right   4/5/2023 48s 28s   2/28/2023 50 s  34s   [norms for men aged 31-35: R=17.54s +/-2.7s; L=18.47s +/-2.94s (Penny et al, 2003)]      Treatment     Garo received the treatments listed below:     Therapeutic activities to improve functional performance for 40 minutes, including:    FOTO completed    See measurements above     Gentle mobilization of joints involving the left and right UE phalanges, metacarpals, carpals, radius and ulna to facilitate increases in passive movement.     Gentle stretching of the soft tissues of the;left and right wrist and hand to decrease edema and improve PROM, potentially allowing for increases in active movement. Extent of movement limited by pain, though not quantified.     Patient Education and Home Exercises      Education provided:   - ed patient & step-dad re: orthotic wear/ care. Dad verbalized understanding  of donning orthoses.   - will need help from family for blocking exercises.   - Progress towards goals     Written Home Exercises Provided: Patient instructed to cont prior HEP.  Exercises were reviewed and Garo was able to demonstrate them prior to the end of the session. Garo demonstrated good  understanding of the HEP provided. See EMR under Patient Instructions for exercises provided during therapy sessions.       Assessment     Pt would continue to benefit from skilled OT.  According to measurements taken today, his strength is less than on evaluation. He stated that his hands hurt really bad today, which could be the cause to this. His coordination is relatively the same. Measurements of digits to be gotten next session. FOTO score increased which signifies increased function. Will need to continue addressing range of motion exercises and low-load prolonged stretch (LLPS) for maximum potential functional return.     Garo is progressing well towards his goals and there are no updates to goals at this time. Pt prognosis is Good.     Pt will continue to benefit from skilled outpatient occupational therapy to address the deficits listed in the problem list on initial evaluation provide pt/family education and to maximize pt's level of independence in the home and community environment.     Pt's spiritual, cultural and educational needs considered and pt agreeable to plan of care and goals.    Anticipated barriers to occupational therapy:  communication; spinal precautions     Goals:  Short Term Goals: 6 weeks   1) Pt will be (I) with initial HEP (progressing 4/5/2023)   2) Pt will be (I) with manual stretching program to increase functional use of bilateral hands (progressing 4/5/2023)   3) Pt will gain 4 lbs of  strength bilaterally for increased participation during functional tasks (progressing 4/5/2023)   4) Pt will be able to open a medication bottle with Mod I 5/5 attempts (progressing 4/5/2023)   5)  Cervical range of motion to be assessed once precautions lifted and goal(s) to be added as necessary (progressing 4/5/2023)   6) Pt will complete 10 minutes on the ergometer, standing, level 3.0 for increased standing/activity tolerance (once precautions are lifted)  (progressing 4/5/2023)   7). Pt will wash and dry 10 cups/dishes with mod I.  (progressing 4/5/2023)      Long Term Goals: 12 weeks   1) Mod I - cutting food (progressing 4/5/2023)   2) Mod I - buttons and fasteners (progressing 4/5/2023)   3) Pt will be able to tie his shoes, Mod I, in a timely manner. (progressing 4/5/2023)   4) Pt will increase fine motor coordination, as evidenced by a decrease of 12 seconds bilaterally during 9 Hole Peg test (progressing 4/5/2023)   5) Pt will increase gross motor coordination, as evidenced by an increase of 10 blocks bilaterally during Box and Block. (progressing 4/5/2023)   6) Pt will self report increased ability to turn steering wheel on riding  to successfully cut the grass in one location at his residence (front or back yard). (progressing 4/5/2023)      Additional functional goals to be added as pt progresses and per his priorities.     PLAN     Certification Period/Plan of care expiration: 2/28/2023 to 5/26/2023.     Outpatient Occupational Therapy 2 times weekly for 12 weeks to include the following interventions: Manual Therapy, Neuromuscular Re-ed, Orthotic Management and Training, Patient Education, Self Care, Therapeutic Activities, and Therapeutic Exercise.     Updates/Grading for next session: upgrade orthosis as appropriate.measure digits using goni     Fernanda Capellan, OT

## 2023-04-10 ENCOUNTER — DOCUMENTATION ONLY (OUTPATIENT)
Dept: REHABILITATION | Facility: HOSPITAL | Age: 33
End: 2023-04-10

## 2023-04-10 NOTE — PROGRESS NOTES
No Show Note/Documentation    Patient: Phong Fofana  Date of Session: 4/10/2023  Diagnosis: Dysarthia  MRN: [id number]    Phong Fofana did not attend his  scheduled therapy appointment today. He did not call to cancel nor reschedule. Patient canceled future appointments as the therapies were not all consecutive/back-to-back. Schedulers were planning to re-schedule patient on this date, but he no showed. Clinician left a voicemail for patient to call back to schedule appointments. Schedulers to continue calling patient to work on re-scheduling. No charges have been posted today.     Mallory Zuniga CCC-SLP   4/10/2023

## 2023-04-13 ENCOUNTER — CLINICAL SUPPORT (OUTPATIENT)
Dept: REHABILITATION | Facility: HOSPITAL | Age: 33
End: 2023-04-13
Payer: MEDICAID

## 2023-04-13 DIAGNOSIS — M24.549 CONTRACTURE OF HAND: ICD-10-CM

## 2023-04-13 DIAGNOSIS — M25.60 DECREASED RANGE OF MOTION: Primary | ICD-10-CM

## 2023-04-13 DIAGNOSIS — R53.1 DECREASED STRENGTH: ICD-10-CM

## 2023-04-13 DIAGNOSIS — R26.89 ABNORMALITY OF GAIT DUE TO IMPAIRMENT OF BALANCE: Primary | ICD-10-CM

## 2023-04-13 DIAGNOSIS — R27.8 DECREASED COORDINATION: ICD-10-CM

## 2023-04-13 DIAGNOSIS — Z98.1 S/P CERVICAL SPINAL FUSION: ICD-10-CM

## 2023-04-13 PROCEDURE — 97110 THERAPEUTIC EXERCISES: CPT | Mod: PN

## 2023-04-13 PROCEDURE — 97530 THERAPEUTIC ACTIVITIES: CPT | Mod: PN

## 2023-04-13 NOTE — PROGRESS NOTES
[facility]  Occupational Therapy Treatment Note     Date: 4/13/2023  Name: Phong Fofana  Clinic Number: [id number]    Therapy Diagnosis:   Encounter Diagnoses   Name Primary?    Decreased range of motion Yes    Decreased coordination     Decreased strength     Contracture of hand      Physician: Tobi Levin,     Physician Orders: OT eval and treat   Medical Diagnosis:   Z98.890 (ICD-10-CM) - History of cervical spinal surgery  Evaluation Date: 2/28/2023  Progress Notes Due: 3/31/23, 4/28/23  Plan of Care Expiration Period: 5/26/2023   Insurance Authorization period Expiration: 12/31/2023  Date of Return to MD: 3/20/2023 / Dr. Levin neurosurgery   Visit # / Visits Authorized: 11 / 40 (+eval)  FOTO: 50/100 on intake   56/100 on 4/5/2023    Time In: 1:50 PM  Time Out: 2:30 PM      Total Billable (one on one) Time: 40 minutes     Precautions: Standard, Fall, Seizure, and Post-op cervical fusion  Post-op week 3; Per patient's dad, MD orders are as follows: wear brace to sleep and to eat, try to take brace off when he's sitting up, maybe off a little when he's walking.  Per [place] [facility] Protocol weeks 0-8:   1. Prevent excessive initial mobility or stress on tissues  2. Limit overhead arm movements, bending and lifting.  3. Please follow physician recommendations regarding use of collars etc. (multilevel fusions hard  collar for 6 wks; one-level fusions wear a collar as needed for a week or two)  No prone, no bridging, no lying with a pillow, no active neck movement for a few weeks.     SUBJECTIVE     Pt reports: Garo has no medical update. He is still waiting for several doctor appointments. He indicated via text to talk tanya that he thinks his right hand is becoming stiff like the left hand.     He was compliant with home exercise program given last session .  Response to previous treatment: no concerns   Functional change:  no significant change compared to previous  session per pt report     Pain: 6 /10   Location: digits on both hands     OBJECTIVE     Objective Measures updated at progress report unless specified.    Left hand: pt reports that he is attempting to extend digits but they are not moving (so resting = extending as much as he can)     Thumb MCP: 119 extension   Thumb IP: 102 extension     2nd MCP :142 extension   2nd PIP: 90 degrees   2nd DIP:180 with hyperextension if moved     3rd MCP :116 extension   3rd PIP: 92 extension   3rd DIP: hyperextended     4th MCP :96 extension   4th PIP: 90 extension   4th DIP: hyper extended     5th MCP :107 extension   5th PIP: 95 extension   5th DIP: hyper extended     Right hand: extended digits     Thumb MCP: 121 extension   Thumb IP: 98 extension     2nd MCP :WNL extension    2nd PIP: WNL extension   2nd DIP:180 with hyperextension if moved     3rd MCP :WNL extension   3rd PIP: WNL extension   3rd DIP: hyperextended     4th MCP :WNL extension   4th PIP: 136 extension   4th DIP: hyper extended     5th MCP :WNL extension   5th PIP: 105 extension   5th DIP: hyper extended     Treatment     Garo received the treatments listed below:     Therapeutic activities to improve functional performance for 40 minutes, including:    See measurements above     Gentle mobilization of joints involving the left and right UE phalanges, metacarpals, carpals, radius and ulna to facilitate increases in passive movement.     Gentle stretching of the soft tissues of the;left and right wrist and hand to decrease edema and improve PROM, potentially allowing for increases in active movement. Extent of movement limited by pain, though not quantified.     Modified intrinsic plus + claw   -mod verbal cues to slow down and squeeze his digits    Coordination board (tying)   Able to tie the board (5 ties) 1x with mod verbal cues to incorporate left hand as much as he could. He frequently only uses it as absolutely necessary.     Patient Education and Home  Exercises      Education provided:   - ed patient & step-dad re: orthotic wear/ care. Dad verbalized understanding of donning orthoses.   - will need help from family for blocking exercises.   - Progress towards goals     Written Home Exercises Provided: Patient instructed to cont prior HEP.  Exercises were reviewed and Garo was able to demonstrate them prior to the end of the session. Garo demonstrated good  understanding of the HEP provided. See EMR under Patient Instructions for exercises provided during therapy sessions.       Assessment     Pt would continue to benefit from skilled OT. Bilateral hand pain present today. It seems to be steadily increasing per pt report. Pt does not think he could tolerate increasing diameter of orthosis for increased stretch at this time. Measurements taken today of bilateral digit extension with minimal change.  Will need to continue addressing range of motion exercises and low-load prolonged stretch (LLPS) for maximum potential functional return.     Garo is progressing well towards his goals and there are no updates to goals at this time. Pt prognosis is Good.     Pt will continue to benefit from skilled outpatient occupational therapy to address the deficits listed in the problem list on initial evaluation provide pt/family education and to maximize pt's level of independence in the home and community environment.     Pt's spiritual, cultural and educational needs considered and pt agreeable to plan of care and goals.    Anticipated barriers to occupational therapy:  communication; spinal precautions     Goals:  Short Term Goals: 6 weeks   1) Pt will be (I) with initial HEP (progressing 4/13/2023)   2) Pt will be (I) with manual stretching program to increase functional use of bilateral hands (progressing 4/13/2023)   3) Pt will gain 4 lbs of  strength bilaterally for increased participation during functional tasks (progressing 4/13/2023)   4) Pt will be able to open  a medication bottle with Mod I 5/5 attempts (progressing 4/13/2023)   5) Cervical range of motion to be assessed once precautions lifted and goal(s) to be added as necessary (progressing 4/13/2023)   6) Pt will complete 10 minutes on the ergometer, standing, level 3.0 for increased standing/activity tolerance (once precautions are lifted)  (progressing 4/13/2023)   7). Pt will wash and dry 10 cups/dishes with mod I.  (progressing 4/13/2023)      Long Term Goals: 12 weeks   1) Mod I - cutting food (progressing 4/13/2023)   2) Mod I - buttons and fasteners (progressing 4/13/2023)   3) Pt will be able to tie his shoes, Mod I, in a timely manner. (progressing 4/13/2023)   4) Pt will increase fine motor coordination, as evidenced by a decrease of 12 seconds bilaterally during 9 Hole Peg test (progressing 4/13/2023)   5) Pt will increase gross motor coordination, as evidenced by an increase of 10 blocks bilaterally during Box and Block. (progressing 4/13/2023)   6) Pt will self report increased ability to turn steering wheel on riding  to successfully cut the grass in one location at his residence (front or back yard). (progressing 4/13/2023)      Additional functional goals to be added as pt progresses and per his priorities.     PLAN     Certification Period/Plan of care expiration: 2/28/2023 to 5/26/2023.     Outpatient Occupational Therapy 2 times weekly for 12 weeks to include the following interventions: Manual Therapy, Neuromuscular Re-ed, Orthotic Management and Training, Patient Education, Self Care, Therapeutic Activities, and Therapeutic Exercise.     Updates/Grading for next session: upgrade orthosis as appropriate.    Fernanda Capellan, OT

## 2023-04-14 NOTE — PLAN OF CARE
[facility]  Physical Therapy Plan of Care Note    Name: Phong Fofana  Clinic Number: [id number]    Therapy Diagnosis:   Encounter Diagnoses   Name Primary?    Abnormality of gait due to impairment of balance Yes    S/P cervical spinal fusion      Physician: Tobi Levin DO    Visit Date: 4/13/2023    Physician Orders: PT Eval and Treat   Medical Diagnosis from Referral:   H/O cervical spine surgery  - Primary     Z98.890      Evaluation Date: 2/13/2023  Authorization Period Expiration: 2/9/2023 - 2/9/3034  Updated Plan of Care Expiration: 5/26/2023  Visit # / Visits authorized: 15/20 (16)     PTA Visit #: 0/5      Time In: 1430  Time Out: 1510  Total Billable Time: 40 minutes      Precautions: Standard, Fall, and Post-op cervical fusion  Surgery date: 1/17/23 (Post-op week 11)   Per patient's dad, MD orders are as follows: wear brace to sleep and to eat, try to take brace off when he's sitting up, maybe off a little when he's walking.  Per [facility] Protocol weeks 8-12:   Avoid cervical loading (overhead arm resisted movements)  Avoid passive stretching of cervical spine      SUBJECTIVE     Pt reports:Patient indicates that his neck is not feeling good today.      He was compliant with home exercise program  Response to previous treatment: no problems stated  Functional change: ongoing     Pain: 5/10  Location: neck      OBJECTIVE     Garo participated in therapeutic activities to assess/improve functional performance for 30 minutes, including:    Patient participated in reassessment of standardized outcome measures as follows:     Functional Gait Assessment:   1. Gait on level surface =  3  (3) Normal: less than 5.5 sec, no A.D., no imbalance, normal gait pattern, deviates< 6in  (2) Mild impairment: 7-5.6 sec, uses A.D., mild gait deviations, or deviates 6-10 in  (1) Moderate impairment: > 7 sec, slow speed, imbalance, deviates 10-15 in.  (0) Severe impairment:  needs assist, deviates >15 in, reach/touch wall  2. Change in Gait Speed = 3  (3) Normal: smooth change w/o loss of balance or gait deviation, deviates < 6 in, significant difference between speeds  (2) Mild impairment: changes speed, but demonstrates mild gait deviations, deviates 6-10 in, OR no deviations but unable to significantly speed, OR uses A.D.  (1) Moderate impairment: minor changes to speed, OR changes speed w/ significant deviations, deviates 10-15 in, OR  Changes speed , but loses balance & recovers  (0) Severe impairment: cannot change speed, deviates >15 in, or loses balance & needs assist  3. Gait with horizontal head turns  = 2  (3) Normal: no change in gait, deviates <6 in  (2) Mild impairment: slight change in speed, deviates 6-10 in, OR uses A.D.  (1) Moderate impairment: moderate change in speed, deviates 10-15 in  (0) Severe impairment: severe disruption of gait, deviates >15in  4. Gait with vertical head turns = 2  (3) Normal: no change in gait, deviates <6 in  (2) Mild impairment: slight change in speed, deviates 6-10 in OR uses A.D.  (1) Moderate impairment: moderate change in speed, deviates 10-15 in  (0) Severe impairment: severe disruption of gait, deviates >15 in  5. Gait with pivot turns = 3  (3) Normal: performs safely in 3 sec, no LOB  (2) Mild impairment: performs in >3 sec & no LOB, OR turns safely & requires several steps to regain LOB  (1) Moderate impairment: turns slow, OR requires several small steps for balance following turn & stop  (0) Severe impairment: cannot turn safely, needs assist  6. Step over obstacle = 1  (3) Normal: steps over 2 stacked boxes w/o change in speed or LOB  (2) Mild impairment: able to step over 1 box w/o change in speed or LOB  (1) Moderate impairment: steps over 1 box but must slow down, may require VC  (0) Severe impairment: cannot perform w/o assist  7. Gait with Narrow ARIAS = 1  (3) Normal: 10 steps no staggering  (2) Mild impairment: 7-9  "steps  (1) Moderate impairment: 4-7 steps  (0) Severe impairment: < 4 steps or cannot perform w/o assist  8. Gait with eyes closed = 1  (3) Normal: < 7 sec, no A.D., no LOB, normal gait pattern, deviates <6 in  (2) Mild impairment: 7.1-9 sec, mild gait deviations, deviates 6-10 in  (1) Moderate impairment: > 9 sec, abnormal pattern, LOB, deviates 10-15 in  (0) Severe impairment: cannot perform w/o assist, LOB, deviates >15in  9. Ambulating Backwards = 2  (3) Normal: no A.D., no LOB, normal gait pattern, deviates <6in  (2) Mild impairment: uses A.D., slower speed, mild gait deviations, deviates 6-10 in  (1) Moderate impairment: slow speed, abnormal gait pattern, LOB, deviates 10-15 in  (0) Severe impairment: severe gait deviations or LOB, deviates >15in  10. Steps = 1  (3) Normal: alternating feet, no rail  (2) Mild Impairment: alternating feet, uses rail  (1) Moderate impairment: step-to, uses rail  (0) Severe impairment: cannot perform safely    Score 20/30     Score:   <22/30 fall risk   <20/30 fall risk in older adults   <18/30 fall risk in Parkinsons         APTA Core Set Outcome Measures for Adults with Neurologic Conditions    2/16/23 4/13/2023   Reference values    Timed Up and Go Not tested      score >14 seconds indicates risk for falls in older stroke patients (Kyleigh et al, 2006)   10 Meter Walk Test  Not tested    0-0.4 m/s = household walker  0.4-0.8 m/s = limited community ambulator   0.8-1.4 m/s = community ambultor  >1.4 m/s = can cross street safely    Functional Gait Assessment  13/30 20/30 <22/30 = identifies fall risk in community dwelling older adults   (MCID = 4)   Melton Balance Test Not tested      Fall risk cut-off for stroke= 45/56   6 Minute Walk Test   1466'09" 1531'08" 6 Minute Walk Test Distances: Means by Age and Gender    Age Gender Mean   60-69 Female  Male 572 meters (1876 feet)  538 meters (1765 feet)   70-79 Female  Male 527 meters (1729 feet)  471 meters (1545 feet)   80-89 " Female  Male 417 meters (1368 feet)  392 meters ( 1286 feet)    ROBBIE Early (2000)      5 times sit-stand   Not tested      >12 sec= fall risk for general elderly  >10 sec= balance/vestibular dysfunction (<61 y/o)  >14.2 sec= balance/vestibular dysfunction (>61 y/o)  >12 sec= fall risk for stroke       FOTO Neuromuscular Condition Survey:       Therapist reviewed FOTO scores for Phong Fofana on 4/13/2023.   FOTO documents entered into [facility] - see Media section.    Functional Status Score: 56          Garo received therapeutic exercises to develop strength, endurance, and ROM for 10 minutes including:    SciFit recumbent bike level 5 x 10 minutes       ASSESSMENT    Garo provided good participation and effort during session today with focus on reassessment of gait endurance, dynamic gait and self-perceived functional abilities to evaluate progress towards goals established at start of therapy. Patient has met 3/4 short term goals and 3/8 long term goals established at initial evaluation. he is demonstrating good progress towards remaining short and long term goals. hedisplays improved dynamic gait and endurance as compared to initial evaluation. During session today, patient participated in standardized outcome measures including the Functional Gait Assessment and 6 Minute Walk Test today. While he has met his goal for improvement on the Functional Gait Assessment, his score still indicates and increased risk for falls with dynamic, community level mobility. He demonstrated specific challenge with stair negotiation and will benefit from further training to improve this aspect of community mobility in future sessions. Additionally, he continued to report neck pain. See goals section below for status of remaining goals     Physical therapist feels that patient is progressing towards long term goals established at start of care and has potential for continued improvement. he remains a good candidate for  outpatient physical therapy.     Patient was provided education regarding results of reassessment completed today and recommendation for continuation of physical therapy plan of care for  6 additional weeks.       Previous Short Term Goals Status:   3/4 met   New Short Term Goals Status:   3/4  Long Term Goal Status: continue per initial plan of care.  Reasons for Recertification of Therapy:   Mr. Fofana continues to present with decreased endurance, safety with dynamic gait and neck pain contributing to decreased safety and independence with community level functional mobility. he has demonstrated progress towards goals established at initial evaluation, and PT feels that he has potential for continued functional improvement with further therapy. he  will benefit from continued skilled PT intervention to address impairments and improve his/her overall safety and (I) with functional mobility.       GOALS    Goals:   Short Term Goals: 4 weeks   Garo will report less difficulty with moderate activities like moving a table or pushing a vacuum on her FOTO to show improvements with his function. Ongoing (4/13/2023)   Garo will perform 6 Minute walk test to assess ambulatory endurance and establish appropriate goals. Met  Garo will perform Functional Gait Assessment to assess dynamic balance and fall risk with walking in the home and community. Met  Garo will report being able to tolerate 15 minutes of exercise and cardiovascular activity without increases in his neck pain. Met     Long Term Goals: 8 weeks   Garo will improve is FOTO limitation score to 28% to show improvements in his overall functional mobility. Ongoing (4/13/2023)   Garo will report and demonstrate understanding of proper body mechanics with squatting and lifting activities. progressing  Garo will tolerate active cervical range of motion to 25-50% of total range of motion in all planes. MET  Garo will be able to maintain a chin tuck for 10 seconds  without increases in pain. progressing  Garo will report being able to tolerate 30 minutes of exercise and cardiovascular activity without increases in his neck pain. MET  Garo will improve his distance on the 6MWT to 1616 to show improvements in his ambulatory endurance. (MDC for spinal cord injury = 45.8 m or ~150 feet). Progressing (4/13/2023)  Garo will improve his score on the FGA to 18/30 to show improvements in his dynamic balance with gait activities and his fall risk MET (4/13/2023)   Garo will tolerate dynamic sitting and standing activities for 45-60 minutes without reported increase in pain. ongoing    PLAN     Updated Certification Period: 4/13/2023 to 5/26/2023   Recommended Treatment Plan: 2 times per week for 6 weeks:  Gait Training, Manual Therapy, Moist Heat/ Ice, Neuromuscular Re-ed, Orthotic Management and Training, Patient Education, Therapeutic Activities, and Therapeutic Exercise  Other Recommendations: none at this time     BARBI QUINTANA, PT

## 2023-04-17 ENCOUNTER — CLINICAL SUPPORT (OUTPATIENT)
Dept: REHABILITATION | Facility: HOSPITAL | Age: 33
End: 2023-04-17
Payer: MEDICAID

## 2023-04-17 DIAGNOSIS — Z98.1 S/P CERVICAL SPINAL FUSION: ICD-10-CM

## 2023-04-17 DIAGNOSIS — R26.89 ABNORMALITY OF GAIT DUE TO IMPAIRMENT OF BALANCE: Primary | ICD-10-CM

## 2023-04-17 PROCEDURE — 97112 NEUROMUSCULAR REEDUCATION: CPT | Mod: PN

## 2023-04-17 PROCEDURE — 97110 THERAPEUTIC EXERCISES: CPT | Mod: PN

## 2023-04-17 RX ORDER — GABAPENTIN 100 MG/1
100 CAPSULE ORAL 3 TIMES DAILY
Qty: 90 CAPSULE | Refills: 1 | Status: CANCELLED | OUTPATIENT
Start: 2023-04-17

## 2023-04-17 RX ORDER — GABAPENTIN 100 MG/1
100 CAPSULE ORAL 3 TIMES DAILY
Qty: 90 CAPSULE | Refills: 1 | Status: SHIPPED | OUTPATIENT
Start: 2023-04-17

## 2023-04-17 NOTE — PROGRESS NOTES
"[facility]   Physical Therapy Treatment Note     Name: Phong Fofana  Clinic Number: [id number]    Therapy Diagnosis:   Encounter Diagnoses   Name Primary?    Abnormality of gait due to impairment of balance Yes    S/P cervical spinal fusion      Physician: Tobi Levin DO    Visit Date: 4/17/2023    Physician Orders: PT Eval and Treat   Medical Diagnosis from Referral:   H/O cervical spine surgery  - Primary     Z98.890    Evaluation Date: 2/13/2023  Authorization Period Expiration: 2/9/2023 - 4/16/23  Updated Plan of Care Expiration: 5/26/2023  Visit # / Visits authorized: 16/20 (17)    PTA Visit #: 0/5     Time In: 1120  Time Out: 1158  Total Billable Time: 38 minutes     Precautions: Standard, Fall, and Post-op cervical fusion  Surgery date: 1/17/23 (Post-op week 12)     SUBJECTIVE     Pt reports: He is feeling okay today. Mild pain in his neck at the start of the session    He was compliant with home exercise program  Response to previous treatment: no problems stated  Functional change: ongoing    Pain: 4/10  Location: neck      OBJECTIVE     Objective Measures updated at progress report unless specified.     Treatment     Garo received the treatments listed below:       Garo received therapeutic exercises to develop strength, endurance, range of motion, core stability, and flexibility for 26 minutes including:     SciFit recumbent bike level 4 x15 minutes  Seated cervical range of motion x1 minute in each direction (flex/ext, left/right rotation, and sidebending)  Quadruped reach throughs with arm on bolster x10 each side    Garo participated in neuromuscular re-education activities to improve: Balance, Coordination, Sense, Proprioception, and Posture for 12 minutes. The following activities were included:    Seated cervical retractions 2 x10 x3" hold  Laser maze x30" up/down/left/right eyes open  Laser maze 2 x30" left/right eyes closed  Laser maze 2 x30" up/down eyes " "closed  Stair training on 4" steps with verbal cues to maintain upright posture x3 laps (demo'd step to pattern)      Patient Education and Home Exercises     Home Exercises Provided and Patient Education Provided     Education provided:   - Patient provided with verbal and demonstrative instruction for all activities performed in today's session.   - gentle Cervical range of motion in pain free range    Written Home Exercises Provided: Patient instructed to cont prior HEP.  See EMR under Patient Instructions for exercises provided during therapy sessions.    ASSESSMENT     Garo provided good participation and effort during today's session with treatment focused on lower extremity endurance, cervical/upper thoracic range of motion and and functional mobility. He tolerated the session well with minimal increase in his neck pain experienced with activity. He performed well with the laser maze activity aimed at improving the proprioceptive feedback in his neck although he had more difficulty maintaining the laser in the path with vertical head nods. He also continued to have difficulty with stair navigation today; able to ascend/descend 4" stairs with no hand support and minimal trunk flexion however only able to perform step to pattern. He continues to be appropriate for skilled physical therapy interventions to maximize his recovery s/p cervical fusion and improve his dynamic standing balance.    Garo Is progressing towards his goals.   Pt prognosis is Excellent.     Pt will continue to benefit from skilled outpatient physical therapy to address the deficits listed in the problem list box on initial evaluation, provide pt/family education and to maximize pt's level of independence in the home and community environment.     Pt's spiritual, cultural and educational needs considered and pt agreeable to plan of care and goals.     Anticipated barriers to physical therapy: None    Goals:   Short Term Goals: 4 weeks "   Garo will report less difficulty with moderate activities like moving a table or pushing a vacuum on her FOTO to show improvements with his function. Ongoing.  Garo will perform 6 Minute walk test to assess ambulatory endurance and establish appropriate goals. Met  Garo will perform Functional Gait Assessment to assess dynamic balance and fall risk with walking in the home and community. Met  Garo will report being able to tolerate 15 minutes of exercise and cardiovascular activity without increases in his neck pain. Met     Long Term Goals: 8 weeks   Garo will improve is FOTO limitation score to 28% to show improvements in his overall functional mobility. Ongoing  Garo will report and demonstrate understanding of proper body mechanics with squatting and lifting activities. progressing  Garo will tolerate active cervical range of motion to 25-50% of total range of motion in all planes. MET  Garo will be able to maintain a chin tuck for 10 seconds without increases in pain. progressing  Garo will report being able to tolerate 30 minutes of exercise and cardiovascular activity without increases in his neck pain. met  Garo will improve his distance on the 6MWT to 1616 to show improvements in his ambulatory endurance. (MDC for spinal cord injury = 45.8 m or ~150 feet). ongoing  Garo will improve his score on the FGA to 18/30 to show improvements in his dynamic balance with gait activities and his fall risk. Ongoing  Garo will tolerate dynamic sitting and standing activities for 45-60 minutes without reported increase in pain. ongoing    PLAN     Updated Certification Period: 4/13/2023 to 5/26/2023   Recommended Treatment Plan: 2 times per week for 6 weeks:  Gait Training, Manual Therapy, Moist Heat/ Ice, Neuromuscular Re-ed, Orthotic Management and Training, Patient Education, Therapeutic Activities, and Therapeutic Exercise  Other Recommendations: none at this time     Recommendations for next session:  progress thoracic mobility, core stability, scapular mobility, and dynamic standing balance    Yanna Wiggins, PT, DPT

## 2023-04-25 DIAGNOSIS — R47.1 DYSARTHRIA: Primary | ICD-10-CM

## 2023-04-27 ENCOUNTER — CLINICAL SUPPORT (OUTPATIENT)
Dept: REHABILITATION | Facility: HOSPITAL | Age: 33
End: 2023-04-27
Payer: MEDICAID

## 2023-04-27 DIAGNOSIS — M24.549 CONTRACTURE OF HAND: ICD-10-CM

## 2023-04-27 DIAGNOSIS — R26.89 ABNORMALITY OF GAIT DUE TO IMPAIRMENT OF BALANCE: Primary | ICD-10-CM

## 2023-04-27 DIAGNOSIS — M25.60 DECREASED RANGE OF MOTION: Primary | ICD-10-CM

## 2023-04-27 DIAGNOSIS — R27.8 DECREASED COORDINATION: ICD-10-CM

## 2023-04-27 DIAGNOSIS — Z98.1 S/P CERVICAL SPINAL FUSION: ICD-10-CM

## 2023-04-27 DIAGNOSIS — R53.1 DECREASED STRENGTH: ICD-10-CM

## 2023-04-27 PROCEDURE — 97530 THERAPEUTIC ACTIVITIES: CPT | Mod: PN

## 2023-04-27 PROCEDURE — 97110 THERAPEUTIC EXERCISES: CPT | Mod: PN,CQ

## 2023-04-27 NOTE — PROGRESS NOTES
[facility]  Occupational Therapy Treatment Note     Date: 4/27/2023  Name: Phong Fofana  Clinic Number: [id number]    Therapy Diagnosis:   Encounter Diagnoses   Name Primary?    Decreased range of motion Yes    Decreased coordination     Decreased strength     Contracture of hand      Physician: Tobi Levin,     Physician Orders: OT eval and treat   Medical Diagnosis:   Z98.890 (ICD-10-CM) - History of cervical spinal surgery  Evaluation Date: 2/28/2023  Progress Notes Due: 3/31/23, 4/28/23  Plan of Care Expiration Period: 5/26/2023   Insurance Authorization period Expiration: 12/31/2023  Date of Return to MD: 3/20/2023 / Dr. Levin neurosurgery   Visit # / Visits Authorized: 12 / 40 (+eval)  FOTO: 50/100 on intake   56/100 on 4/5/2023    Time In: 1:50 PM  Time Out: 2:30 PM      Total Billable (one on one) Time: 40 minutes     Precautions: Standard, Fall, Seizure, and Post-op cervical fusion  Post-op week 3; Per patient's dad, MD orders are as follows: wear brace to sleep and to eat, try to take brace off when he's sitting up, maybe off a little when he's walking.  Per [facility] Protocol weeks 0-8:   1. Prevent excessive initial mobility or stress on tissues  2. Limit overhead arm movements, bending and lifting.  3. Please follow physician recommendations regarding use of collars etc. (multilevel fusions hard  collar for 6 wks; one-level fusions wear a collar as needed for a week or two)  No prone, no bridging, no lying with a pillow, no active neck movement for a few weeks.     SUBJECTIVE     Pt reports: Garo reports that his hands are bothering him. He states that one of his families vehicles has stopped working, leaving them with one vehicle.     He was compliant with home exercise program given last session .  Response to previous treatment: no concerns   Functional change:  no significant change compared to previous session per pt report     Pain: 4 /10  "  Location: digits on both hands     OBJECTIVE     Objective Measures updated at progress report unless specified.    Treatment     Garo received the treatments listed below:     Therapeutic activities to improve functional performance for 40 minutes, including:    -UBE x5 min forward/ 5 min back w/ rosi UE, level 1.0 seated with a towel behind his back for posture, to increase rosi UE act christ and UB strength. He was encouraged to keep rate of perceived exertion (RPE) at "easy to moderate" (3-4/10). Stearns avg=7, peak stearns=11 Pt's reported RPE= 5/10.     Gentle mobilization of joints involving the left and right UE phalanges, metacarpals, carpals, radius and ulna to facilitate increases in passive movement.     Gentle stretching of the soft tissues of the;left and right wrist and hand to decrease edema and improve PROM, potentially allowing for increases in active movement. Extent of movement limited by pain, though not quantified.    Digit extension + relax 2x15 each hand    "Thumbs up" 2x15 each hand      Modified intrinsic plus + claw 2x15    -mod verbal cues to slow down and squeeze his digits    Wash cloth placed in left palm to keep dry for skin integrity (palm was wet with sweat)     Bilateral wrist circles 2x15     Mini sit to stands, putting weight though bilateral palms 1x10    -discomfort present     Patient Education and Home Exercises      Education provided:   - ed patient & step-dad re: orthotic wear/ care. Dad verbalized understanding of donning orthoses.   - will need help from family for blocking exercises.   - Progress towards goals     Written Home Exercises Provided: Patient instructed to cont prior HEP.  Exercises were reviewed and Garo was able to demonstrate them prior to the end of the session. Garo demonstrated good  understanding of the HEP provided. See EMR under Patient Instructions for exercises provided during therapy sessions.       Assessment     Pt would continue to benefit from " skilled OT. Bilateral hand pain present today. It seems to be a steady pain per pt report. Stretches and range of motion exercises completed today which where limited by pain and decreased movement. He continues to wear his orthoses with no issues reported. Will need to continue addressing range of motion exercises and low-load prolonged stretch (LLPS) for maximum potential functional return.     Garo is progressing well towards his goals and there are no updates to goals at this time. Pt prognosis is Good.     Pt will continue to benefit from skilled outpatient occupational therapy to address the deficits listed in the problem list on initial evaluation provide pt/family education and to maximize pt's level of independence in the home and community environment.     Pt's spiritual, cultural and educational needs considered and pt agreeable to plan of care and goals.    Anticipated barriers to occupational therapy:  communication; spinal precautions     Goals:  Short Term Goals: 6 weeks   1) Pt will be (I) with initial HEP (progressing 4/27/2023)   2) Pt will be (I) with manual stretching program to increase functional use of bilateral hands (progressing 4/27/2023)   3) Pt will gain 4 lbs of  strength bilaterally for increased participation during functional tasks (progressing 4/27/2023)   4) Pt will be able to open a medication bottle with Mod I 5/5 attempts (progressing 4/27/2023)   5) Cervical range of motion to be assessed once precautions lifted and goal(s) to be added as necessary (progressing 4/27/2023)   6) Pt will complete 10 minutes on the ergometer, standing, level 3.0 for increased standing/activity tolerance (once precautions are lifted)  (progressing 4/27/2023)   7). Pt will wash and dry 10 cups/dishes with mod I.  (progressing 4/27/2023)      Long Term Goals: 12 weeks   1) Mod I - cutting food (progressing 4/27/2023)   2) Mod I - buttons and fasteners (progressing 4/27/2023)   3) Pt will be able  to tie his shoes, Mod I, in a timely manner. (progressing 4/27/2023)   4) Pt will increase fine motor coordination, as evidenced by a decrease of 12 seconds bilaterally during 9 Hole Peg test (progressing 4/27/2023)   5) Pt will increase gross motor coordination, as evidenced by an increase of 10 blocks bilaterally during Box and Block. (progressing 4/27/2023)   6) Pt will self report increased ability to turn steering wheel on riding  to successfully cut the grass in one location at his residence (front or back yard). (progressing 4/27/2023)      Additional functional goals to be added as pt progresses and per his priorities.     PLAN     Certification Period/Plan of care expiration: 2/28/2023 to 5/26/2023.     Outpatient Occupational Therapy 2 times weekly for 12 weeks to include the following interventions: Manual Therapy, Neuromuscular Re-ed, Orthotic Management and Training, Patient Education, Self Care, Therapeutic Activities, and Therapeutic Exercise.     Updates/Grading for next session: upgrade orthosis as appropriate.    Fernanda Capellan OT

## 2023-04-27 NOTE — PROGRESS NOTES
[facility]   Physical Therapy Treatment Note     Name: Phong Fofana  [facility] Number: [id number]    Therapy Diagnosis:   Encounter Diagnoses   Name Primary?    Abnormality of gait due to impairment of balance Yes    S/P cervical spinal fusion      Physician: Tobi Levin DO    Visit Date: 4/27/2023    Physician Orders: PT Eval and Treat   Medical Diagnosis from Referral:   H/O cervical spine surgery  - Primary     Z98.890    Evaluation Date: 2/13/2023  Authorization Period Expiration: 2/9/2023 - 4/16/23  Updated Plan of Care Expiration: 5/26/2023  Visit # / Visits authorized: 17/20 (18)    PTA Visit #: 1/5     Time In: 1430  Time Out: 1510  Total Billable Time: 40 minutes     Precautions: Standard, Fall, and Post-op cervical fusion  Surgery date: 1/17/23 (Post-op week 12)     SUBJECTIVE     Pt reports: Indicating sore and stiff neck.  He was compliant with home exercise program  Response to previous treatment: no problems stated  Functional change: ongoing    Pain: 4/10  Location: neck      OBJECTIVE     Objective Measures updated at progress report unless specified.     Treatment     Garo received the treatments listed below:       Garo received therapeutic exercises to develop strength, endurance, range of motion,  and flexibility for 40 minutes including:     Recumbent bike Level 4 10 minutes    Seated:  Cervical range of motion:  20 times Right Left alternating: rotation and side bend  20 times  flexion/extension   Laser target maze x1 forward and backward  Laser Target Cross 10 times: up/down, Right /Left , 1/2 circles x2 high and low; full [place] clockwise/counterclockwise x3  Chin tucks 10 times, 10 second hold    Patient Education and Home Exercises     Home Exercises Provided and Patient Education Provided     Education provided:   - Patient provided with verbal and demonstrative instruction for all activities performed in today's session.   - gentle Cervical range of  motion in pain free range    Written Home Exercises Provided: Patient instructed to cont prior HEP.  See EMR under Patient Instructions for exercises provided during therapy sessions.    ASSESSMENT     Garo provided good participation and effort during today's session with treatment focused on lower extremity strengthening and flexibility and cervical range of motion. Garo tolerated activities with verbal cues for rest breaks.    Garo Is progressing towards his goals.   Pt prognosis is Good.     Pt will continue to benefit from skilled outpatient physical therapy to address the deficits listed in the problem list box on initial evaluation, provide pt/family education and to maximize pt's level of independence in the home and community environment.     Pt's spiritual, cultural and educational needs considered and pt agreeable to plan of care and goals.     Anticipated barriers to physical therapy: None    Goals:   Short Term Goals: 4 weeks   Garo will report less difficulty with moderate activities like moving a table or pushing a vacuum on her FOTO to show improvements with his function. Ongoing.  Garo will perform 6 Minute walk test to assess ambulatory endurance and establish appropriate goals. Met  Garo will perform Functional Gait Assessment to assess dynamic balance and fall risk with walking in the home and community. Met  Garo will report being able to tolerate 15 minutes of exercise and cardiovascular activity without increases in his neck pain. Met     Long Term Goals: 8 weeks   Garo will improve is FOTO limitation score to 28% to show improvements in his overall functional mobility. Ongoing  Garo will report and demonstrate understanding of proper body mechanics with squatting and lifting activities. progressing  Garo will tolerate active cervical range of motion to 25-50% of total range of motion in all planes. MET  Garo will be able to maintain a chin tuck for 10 seconds without increases in  pain. progressing  Garo will report being able to tolerate 30 minutes of exercise and cardiovascular activity without increases in his neck pain. met  Garo will improve his distance on the 6MWT to 1616 to show improvements in his ambulatory endurance. (MDC for spinal cord injury = 45.8 m or ~150 feet). Ongoing  Garo will improve his score on the FGA to 18/30 to show improvements in his dynamic balance with gait activities and his fall risk. Ongoing  Garo will tolerate dynamic sitting and standing activities for 45-60 minutes without reported increase in pain. ongoing    PLAN     Updated Certification Period: 4/13/2023 to 5/26/2023   Recommended Treatment Plan: 2 times per week for 6 weeks:  Gait Training, Manual Therapy, Moist Heat/ Ice, Neuromuscular Re-ed, Orthotic Management and Training, Patient Education, Therapeutic Activities, and Therapeutic Exercise  Other Recommendations: none at this time     Recommendations for next session: progress thoracic mobility, core stability, scapular mobility, and dynamic standing balance    Dorothea Carver, Physical Therapist Assistant

## 2023-05-01 ENCOUNTER — CLINICAL SUPPORT (OUTPATIENT)
Dept: REHABILITATION | Facility: HOSPITAL | Age: 33
End: 2023-05-01
Payer: MEDICAID

## 2023-05-01 DIAGNOSIS — M24.549 CONTRACTURE OF HAND: ICD-10-CM

## 2023-05-01 DIAGNOSIS — M25.60 DECREASED RANGE OF MOTION: Primary | ICD-10-CM

## 2023-05-01 DIAGNOSIS — R27.8 DECREASED COORDINATION: ICD-10-CM

## 2023-05-01 DIAGNOSIS — R53.1 DECREASED STRENGTH: ICD-10-CM

## 2023-05-01 PROCEDURE — 97530 THERAPEUTIC ACTIVITIES: CPT | Mod: PN

## 2023-05-01 NOTE — PROGRESS NOTES
[facility]  Occupational Therapy Treatment Note     Date: 5/1/2023  Name: Phong Fofana  Clinic Number: [id number]    Therapy Diagnosis:   Encounter Diagnoses   Name Primary?    Decreased range of motion Yes    Decreased coordination     Decreased strength     Contracture of hand      Physician: Tobi Levin,     Physician Orders: OT eval and treat   Medical Diagnosis:   Z98.890 (ICD-10-CM) - History of cervical spinal surgery  Evaluation Date: 2/28/2023  Progress Notes Due: 3/31/23, 4/28/23  Plan of Care Expiration Period: 5/26/2023   Insurance Authorization period Expiration: 12/31/2023  Date of Return to MD: 3/20/2023 / Dr. Levin neurosurgery   Visit # / Visits Authorized: 13 / 40 (+eval)  FOTO: 50/100 on intake   56/100 on 4/5/2023    Time In: 3:20 PM  Time Out: 4:00 PM      Total Billable (one on one) Time: 40 minutes     Precautions: Standard, Fall, Seizure, and Post-op cervical fusion  Post-op week 3; Per patient's dad, MD orders are as follows: wear brace to sleep and to eat, try to take brace off when he's sitting up, maybe off a little when he's walking.  Per [facility] Protocol weeks 0-8:   1. Prevent excessive initial mobility or stress on tissues  2. Limit overhead arm movements, bending and lifting.  3. Please follow physician recommendations regarding use of collars etc. (multilevel fusions hard  collar for 6 wks; one-level fusions wear a collar as needed for a week or two)  No prone, no bridging, no lying with a pillow, no active neck movement for a few weeks.     SUBJECTIVE     Pt reports: Garo reports that he has no medical updates and he is doing fine.     He was compliant with home exercise program given last session .  Response to previous treatment: no concerns   Functional change:  no significant change compared to previous session per pt report     Pain: 5 /10   Location: digits on both hands while stretching     OBJECTIVE     Objective  "Measures updated at progress report unless specified.    Treatment     Garo received the treatments listed below:     Therapeutic activities to improve functional performance for 40 minutes, including:    -UBE x5 min forward/ 5 min back w/ rosi UE, level 1.5 seated with a towel behind his back for posture, to increase rosi UE act christ and UB strength. He was encouraged to keep rate of perceived exertion (RPE) at "easy to moderate" (3-4/10). Stearns avg=8, peak stearns=10 Pt's reported RPE= 5/10.     Gentle mobilization of joints involving the left and right UE phalanges, metacarpals, carpals, radius and ulna to facilitate increases in passive movement.     Gentle stretching of the soft tissues of the;left and right wrist and hand to decrease edema and improve PROM, potentially allowing for increases in active movement. Extent of movement limited by pain, though not quantified.    Blocked active range of motion of each joint x5 bilateral hands     Coban placed on right little finger, ring finger, and thumb while in extension for prolonged stretch of digits     Pool noodle placed in palm of left hand with coban wrapped around hand (especially hitting the DIP's to reduce hyperextension of DIP's) for prolonged stretch of digits.     Cervical range of motion (gentle)    -Extension 10x   -Flexion 10x   -Rotation 10x to each side    -Lateral flexion 10x     Removed Coban / pool noodle from hands to leave session.     Patient Education and Home Exercises      Education provided:   - ed patient & step-dad re: orthotic wear/ care. Dad verbalized understanding of donning orthoses.   - will need help from family for blocking exercises.   - Progress towards goals     Written Home Exercises Provided: Patient instructed to cont prior HEP.  Exercises were reviewed and Garo was able to demonstrate them prior to the end of the session. Garo demonstrated good  understanding of the HEP provided. See EMR under Patient Instructions for " exercises provided during therapy sessions.       Assessment     Pt would continue to benefit from skilled OT. Stretches and range of motion exercises completed today which where limited by pain and decreased movement.  Coban and round objects placed on/in hands for prolonged stretch. Min fatigue reported on UBE today. Cervical range of motion completed in comfortable range. He continues to wear his orthoses with no issues reported. Will need to continue addressing range of motion exercises and low-load prolonged stretch (LLPS) for maximum potential functional return.     Garo is progressing well towards his goals and there are no updates to goals at this time. Pt prognosis is Good.     Pt will continue to benefit from skilled outpatient occupational therapy to address the deficits listed in the problem list on initial evaluation provide pt/family education and to maximize pt's level of independence in the home and community environment.     Pt's spiritual, cultural and educational needs considered and pt agreeable to plan of care and goals.    Anticipated barriers to occupational therapy:  communication; spinal precautions     Goals:  Short Term Goals: 6 weeks   1) Pt will be (I) with initial HEP (progressing 5/1/2023)   2) Pt will be (I) with manual stretching program to increase functional use of bilateral hands (progressing 5/1/2023)   3) Pt will gain 4 lbs of  strength bilaterally for increased participation during functional tasks (progressing 5/1/2023)   4) Pt will be able to open a medication bottle with Mod I 5/5 attempts (progressing 5/1/2023)   5) Cervical range of motion to be assessed once precautions lifted and goal(s) to be added as necessary (progressing 5/1/2023)   6) Pt will complete 10 minutes on the ergometer, standing, level 3.0 for increased standing/activity tolerance (once precautions are lifted)  (progressing 5/1/2023)   7). Pt will wash and dry 10 cups/dishes with mod I.  (progressing  5/1/2023)      Long Term Goals: 12 weeks   1) Mod I - cutting food (progressing 5/1/2023)   2) Mod I - buttons and fasteners (progressing 5/1/2023)   3) Pt will be able to tie his shoes, Mod I, in a timely manner. (progressing 5/1/2023)   4) Pt will increase fine motor coordination, as evidenced by a decrease of 12 seconds bilaterally during 9 Hole Peg test (progressing 5/1/2023)   5) Pt will increase gross motor coordination, as evidenced by an increase of 10 blocks bilaterally during Box and Block. (progressing 5/1/2023)   6) Pt will self report increased ability to turn steering wheel on riding  to successfully cut the grass in one location at his residence (front or back yard). (progressing 5/1/2023)      Additional functional goals to be added as pt progresses and per his priorities.     PLAN     Certification Period/Plan of care expiration: 2/28/2023 to 5/26/2023.     Outpatient Occupational Therapy 2 times weekly for 12 weeks to include the following interventions: Manual Therapy, Neuromuscular Re-ed, Orthotic Management and Training, Patient Education, Self Care, Therapeutic Activities, and Therapeutic Exercise.     Updates/Grading for next session: upgrade orthosis as appropriate.    Fernanda Capellan OT

## 2023-05-10 ENCOUNTER — CLINICAL SUPPORT (OUTPATIENT)
Dept: REHABILITATION | Facility: HOSPITAL | Age: 33
End: 2023-05-10
Payer: MEDICAID

## 2023-05-10 DIAGNOSIS — M25.60 DECREASED RANGE OF MOTION: Primary | ICD-10-CM

## 2023-05-10 DIAGNOSIS — R26.89 ABNORMALITY OF GAIT DUE TO IMPAIRMENT OF BALANCE: Primary | ICD-10-CM

## 2023-05-10 DIAGNOSIS — M24.549 CONTRACTURE OF HAND: ICD-10-CM

## 2023-05-10 DIAGNOSIS — Z98.1 S/P CERVICAL SPINAL FUSION: ICD-10-CM

## 2023-05-10 DIAGNOSIS — R27.8 DECREASED COORDINATION: ICD-10-CM

## 2023-05-10 DIAGNOSIS — R53.1 DECREASED STRENGTH: ICD-10-CM

## 2023-05-10 PROCEDURE — 97530 THERAPEUTIC ACTIVITIES: CPT | Mod: PN

## 2023-05-10 PROCEDURE — 97110 THERAPEUTIC EXERCISES: CPT | Mod: PN,CQ

## 2023-05-10 NOTE — PROGRESS NOTES
[facility]   Physical Therapy Treatment Note     Name: Phong Fofana  [facility] Number: [id number]    Therapy Diagnosis:   Encounter Diagnoses   Name Primary?    Abnormality of gait due to impairment of balance Yes    S/P cervical spinal fusion      Physician: Tobi Levin DO    Visit Date: 5/10/2023    Physician Orders: PT Eval and Treat   Medical Diagnosis from Referral:   H/O cervical spine surgery  - Primary     Z98.890    Evaluation Date: 2/13/2023  Authorization Period Expiration: 2/9/2023 - 4/16/23  Updated Plan of Care Expiration: 5/26/2023  Visit # / Visits authorized: 18/20 (19)    PTA Visit #: 2/5     Time In: 1615  Time Out: 1655  Total Billable Time: 40 minutes     Precautions: Standard, Fall, and Post-op cervical fusion  Surgery date: 1/17/23 (Post-op week 12)     SUBJECTIVE     Pt reports: Indicating neck pain  He indicated he was compliant with home exercise program  Response to previous treatment: no problems stated  Functional change: ongoing    Pain: 5/10  Location: neck      OBJECTIVE     Objective Measures updated at progress report unless specified.     Treatment     Garo received the treatments listed below:       Garo received therapeutic exercises to develop strength, endurance, range of motion,  and flexibility for 40 minutes including:     Recumbent bike Level 4 15 minutes    Rows with green Theraband 1 minute  High rows with green Theraband 1 minute    Standing:  Cervical range of motion:  Laser target maze x1 forward and backward  Laser Target Cross 10 times: up/down x2, Right /Left  x2, 1/2 circles x2 high and low; full [place] clockwise/counterclockwise x3  Chin tucks 10 times   Cervical rotation Right Left 10 times  Cervical side bend Right Left 10 times    Patient Education and Home Exercises     Home Exercises Provided and Patient Education Provided     Education provided:   - Patient provided with verbal and demonstrative instruction for all  activities performed in today's session.     Written Home Exercises Provided: Patient instructed to cont prior HEP.  See EMR under Patient Instructions for exercises provided during therapy sessions.    ASSESSMENT     Garo provided good participation and effort during today's session with treatment focused on lower extremity strengthening/endurance and cervical range of motion. Garo tolerated Cervical range of motion activities with sit rest between tasks. Subhash stated he liked standing better than sitting with Laser activities, reported pain increased to 7/10 after session.    Garo Is progressing towards his goals.   Pt prognosis is Good.     Pt will continue to benefit from skilled outpatient physical therapy to address the deficits listed in the problem list box on initial evaluation, provide pt/family education and to maximize pt's level of independence in the home and community environment.     Pt's spiritual, cultural and educational needs considered and pt agreeable to plan of care and goals.     Anticipated barriers to physical therapy: None    Goals:   Short Term Goals: 4 weeks   Garo will report less difficulty with moderate activities like moving a table or pushing a vacuum on her FOTO to show improvements with his function. Ongoing.  Garo will perform 6 Minute walk test to assess ambulatory endurance and establish appropriate goals. Met  Garo will perform Functional Gait Assessment to assess dynamic balance and fall risk with walking in the home and community. Met  Garo will report being able to tolerate 15 minutes of exercise and cardiovascular activity without increases in his neck pain. Met     Long Term Goals: 8 weeks   Garo will improve is FOTO limitation score to 28% to show improvements in his overall functional mobility. Ongoing  Garo will report and demonstrate understanding of proper body mechanics with squatting and lifting activities. progressing  Garo will tolerate active cervical  range of motion to 25-50% of total range of motion in all planes. MET  Garo will be able to maintain a chin tuck for 10 seconds without increases in pain. progressing  Garo will report being able to tolerate 30 minutes of exercise and cardiovascular activity without increases in his neck pain. met  Garo will improve his distance on the 6MWT to 1616 to show improvements in his ambulatory endurance. (MDC for spinal cord injury = 45.8 m or ~150 feet). Ongoing  Garo will improve his score on the FGA to 18/30 to show improvements in his dynamic balance with gait activities and his fall risk. Ongoing  Garo will tolerate dynamic sitting and standing activities for 45-60 minutes without reported increase in pain. ongoing    PLAN     Updated Certification Period: 4/13/2023 to 5/26/2023   Recommended Treatment Plan: 2 times per week for 6 weeks:  Gait Training, Manual Therapy, Moist Heat/ Ice, Neuromuscular Re-ed, Orthotic Management and Training, Patient Education, Therapeutic Activities, and Therapeutic Exercise  Other Recommendations: none at this time     Dorothea Carver, Physical Therapist Assistant

## 2023-05-10 NOTE — PROGRESS NOTES
[facility]  Occupational Therapy Treatment Note     Date: 5/10/2023  Name: Phong Fofana  Clinic Number: [id number]    Therapy Diagnosis:   Encounter Diagnoses   Name Primary?    Decreased range of motion Yes    Decreased coordination     Decreased strength     Contracture of hand      Physician: Tobi Levin,     Physician Orders: OT eval and treat   Medical Diagnosis:   Z98.890 (ICD-10-CM) - History of cervical spinal surgery  Evaluation Date: 2/28/2023  Progress Notes Due: 3/31/23, 4/28/23  Plan of Care Expiration Period: 5/26/2023   Insurance Authorization period Expiration: 12/31/2023  Date of Return to MD: 3/20/2023 / Dr. Levin neurosurgery   Visit # / Visits Authorized: 14 / 40 (+eval)  FOTO: 50/100 on intake   56/100 on 4/5/2023    Time In: 3:35 PM  Time Out: 4:15 PM      Total Billable (one on one) Time: 40 minutes     Precautions: Standard, Fall, Seizure, and Post-op cervical fusion  Post-op week 3; Per patient's dad, MD orders are as follows: wear brace to sleep and to eat, try to take brace off when he's sitting up, maybe off a little when he's walking.  Per [facility] Protocol weeks 0-8:   1. Prevent excessive initial mobility or stress on tissues  2. Limit overhead arm movements, bending and lifting.  3. Please follow physician recommendations regarding use of collars etc. (multilevel fusions hard  collar for 6 wks; one-level fusions wear a collar as needed for a week or two)  No prone, no bridging, no lying with a pillow, no active neck movement for a few weeks.     SUBJECTIVE     Pt reports: Garo reports he has 20 days until his ortho appointment for his hands. He is excited and ready to see what the doctor says.     He was compliant with home exercise program given last session .  Response to previous treatment: no concerns   Functional change:  no significant change compared to previous session per pt report     Pain: 4 /10   Location: digits on the  "left and right hands     OBJECTIVE     Objective Measures updated at progress report unless specified.    Treatment     Garo received the treatments listed below:     Therapeutic activities to improve functional performance for 40 minutes, including:    -UBE x5 min forward/ 5 min back w/ rosi UE, level 1.7, standing,  to increase rosi UE act christ and UB strength. He was encouraged to keep rate of perceived exertion (RPE) at "easy to moderate" (3-4/10). Stearns avg=15, peak stearns=20  Pt's reported RPE= 4/10.     Gentle mobilization of joints involving the left and right UE phalanges, metacarpals, carpals, radius and ulna to facilitate increases in passive movement.     Gentle stretching of the soft tissues of the;left and right wrist and hand to decrease edema and improve PROM, potentially allowing for increases in active movement.     Created new foam orthosis for left hand as his other has become too comfortable. OT used a pool noodle (due to firm construction) and trimmed it as necessary for correct positioning. Added strap for increased ability to stay in hand.    Patient Education and Home Exercises      Education provided:   - ed patient & step-dad re: orthotic wear/ care. Dad verbalized understanding of donning orthoses.   - will need help from family for blocking exercises.   - Progress towards goals     Written Home Exercises Provided: Patient instructed to cont prior HEP.  Exercises were reviewed and Garo was able to demonstrate them prior to the end of the session. Garo demonstrated good  understanding of the HEP provided. See EMR under Patient Instructions for exercises provided during therapy sessions.       Assessment     Pt would continue to benefit from skilled OT.. Min fatigue reported on UBE today. New foam orthosis created today for increased stretch ( old one was becoming too comfortable). He reported no issues thus far and will report any if they were to occur. He was given warning signs to look " for.  Will need to continue addressing range of motion exercises and low-load prolonged stretch (LLPS) for maximum potential functional return.     Garo is progressing well towards his goals and there are no updates to goals at this time. Pt prognosis is Good.     Pt will continue to benefit from skilled outpatient occupational therapy to address the deficits listed in the problem list on initial evaluation provide pt/family education and to maximize pt's level of independence in the home and community environment.     Pt's spiritual, cultural and educational needs considered and pt agreeable to plan of care and goals.    Anticipated barriers to occupational therapy:  communication; spinal precautions     Goals:  Short Term Goals: 6 weeks   1) Pt will be (I) with initial HEP (progressing 5/10/2023)   2) Pt will be (I) with manual stretching program to increase functional use of bilateral hands (progressing 5/10/2023)   3) Pt will gain 4 lbs of  strength bilaterally for increased participation during functional tasks (progressing 5/10/2023)   4) Pt will be able to open a medication bottle with Mod I 5/5 attempts (progressing 5/10/2023)   5) Cervical range of motion to be assessed once precautions lifted and goal(s) to be added as necessary (progressing 5/10/2023)   6) Pt will complete 10 minutes on the ergometer, standing, level 3.0 for increased standing/activity tolerance (once precautions are lifted)  (progressing 5/10/2023)   7). Pt will wash and dry 10 cups/dishes with mod I.  (progressing 5/10/2023)      Long Term Goals: 12 weeks   1) Mod I - cutting food (progressing 5/10/2023)   2) Mod I - buttons and fasteners (progressing 5/10/2023)   3) Pt will be able to tie his shoes, Mod I, in a timely manner. (progressing 5/10/2023)   4) Pt will increase fine motor coordination, as evidenced by a decrease of 12 seconds bilaterally during 9 Hole Peg test (progressing 5/10/2023)   5) Pt will increase gross motor  coordination, as evidenced by an increase of 10 blocks bilaterally during Box and Block. (progressing 5/10/2023)   6) Pt will self report increased ability to turn steering wheel on riding  to successfully cut the grass in one location at his residence (front or back yard). (progressing 5/10/2023)      Additional functional goals to be added as pt progresses and per his priorities.     PLAN     Certification Period/Plan of care expiration: 2/28/2023 to 5/26/2023.     Outpatient Occupational Therapy 2 times weekly for 12 weeks to include the following interventions: Manual Therapy, Neuromuscular Re-ed, Orthotic Management and Training, Patient Education, Self Care, Therapeutic Activities, and Therapeutic Exercise.     Updates/Grading for next session: orthosis fit check     Fernanda Capellan OT

## 2023-05-15 ENCOUNTER — CLINICAL SUPPORT (OUTPATIENT)
Dept: REHABILITATION | Facility: HOSPITAL | Age: 33
End: 2023-05-15
Payer: MEDICAID

## 2023-05-15 DIAGNOSIS — R27.8 DECREASED COORDINATION: ICD-10-CM

## 2023-05-15 DIAGNOSIS — M24.549 CONTRACTURE OF HAND: ICD-10-CM

## 2023-05-15 DIAGNOSIS — M25.60 DECREASED RANGE OF MOTION: Primary | ICD-10-CM

## 2023-05-15 DIAGNOSIS — R26.89 ABNORMALITY OF GAIT DUE TO IMPAIRMENT OF BALANCE: Primary | ICD-10-CM

## 2023-05-15 DIAGNOSIS — Z98.1 S/P CERVICAL SPINAL FUSION: ICD-10-CM

## 2023-05-15 DIAGNOSIS — R53.1 DECREASED STRENGTH: ICD-10-CM

## 2023-05-15 PROCEDURE — 97530 THERAPEUTIC ACTIVITIES: CPT | Mod: PN

## 2023-05-15 PROCEDURE — 97110 THERAPEUTIC EXERCISES: CPT | Mod: PN

## 2023-05-15 NOTE — PROGRESS NOTES
[facility]   Physical Therapy Treatment Note     Name: Phong Fofana  Clinic Number: [id number]    Therapy Diagnosis:   Encounter Diagnoses   Name Primary?    Abnormality of gait due to impairment of balance Yes    S/P cervical spinal fusion        Physician: Tobi Levin DO    Visit Date: 5/15/2023    Physician Orders: PT Eval and Treat   Medical Diagnosis from Referral:   H/O cervical spine surgery  - Primary     Z98.890    Evaluation Date: 2/13/2023  Authorization Period Expiration: 2/9/2023 - 4/16/23  Updated Plan of Care Expiration: 5/26/2023  Visit # / Visits authorized: 19/20 (20)    PTA Visit #: 0/5     Time In: 1030  Time Out: 1111  Total Billable Time: 41 minutes     Precautions: Standard, Fall, and Post-op cervical fusion  Surgery date: 1/17/23 (Post-op week 16)     SUBJECTIVE     Pt reports: Patient nodded when asked if he was having neck pain and indicated it was the same as in previous sessions.   He indicated he was compliant with home exercise program  Response to previous treatment: no problems stated  Functional change: ongoing    Pain: 5/10  Location: neck      OBJECTIVE     Objective Measures updated at progress report unless specified.     Seen today following occupational therapy treatment with splint donned to left hand and pool noodle (secured by coban) in left hand.     Treatment     Garo received the treatments listed below:       Garo received therapeutic exercises to develop strength, endurance, range of motion,  and flexibility for 26 minutes including:     Recumbent bike Level 4 x 10  minutes   Supine cervical nods x 10 (slow) repetitions   Supine alternating shoulder flexion with maintained cervical nod x 10 repetitions  Supine cervical rotation active range of motion with instructions to perform in pain free range x 10 repetitions   Side stepping (with mini squat) with green theraband around ankles, 3 x approximately 8 feet each direction  (left/right)   Hip extension with resistance from green theraband and light to no upper extremity support, x 10 repetitions each lower extremity       Garo participated in neuromuscular re-education activities to improve: Balance, Coordination, and Proprioception for 15 minutes. The following activities were included:    Lazer target maze focusing on cervical range of motion and proprioception  2. Patient performed taps to Blaze Pods (right foot to red light, left foot to blue light) with focus on dynamic balance and coordination. 4 x 30 seconds with no upper extremity support         Patient Education and Home Exercises     Home Exercises Provided and Patient Education Provided     Education provided:   - Patient provided with verbal and demonstrative instruction for all activities performed in today's session.     Written Home Exercises Provided: Patient instructed to cont prior HEP.  See EMR under Patient Instructions for exercises provided during therapy sessions.    ASSESSMENT     Garo provided good participation and effort during today's session with treatment focused on lower extremity strengthening/endurance, cervical strengthening/range of motion /proprioception and dynamic standing balance. Patient indicated some discomfort with all cervical exercises today, but encouraged to limit range of motion to a pain free range. He did well with dynamic balance to Blaze Pods with no losses of balance noted. He reported 1 point increase in pain level in neck at end of session. Will benefit from continued skilled physical therapy intervention to address goals below and assist in return to PLOF.         Garo Is progressing towards his goals.   Pt prognosis is Good.     Pt will continue to benefit from skilled outpatient physical therapy to address the deficits listed in the problem list box on initial evaluation, provide pt/family education and to maximize pt's level of independence in the home and community  environment.     Pt's spiritual, cultural and educational needs considered and pt agreeable to plan of care and goals.     Anticipated barriers to physical therapy: None    Goals:   Short Term Goals: 4 weeks   Garo will report less difficulty with moderate activities like moving a table or pushing a vacuum on her FOTO to show improvements with his function. Ongoing.  Garo will perform 6 Minute walk test to assess ambulatory endurance and establish appropriate goals. Met  Garo will perform Functional Gait Assessment to assess dynamic balance and fall risk with walking in the home and community. Met  Garo will report being able to tolerate 15 minutes of exercise and cardiovascular activity without increases in his neck pain. Met     Long Term Goals: 8 weeks   Garo will improve is FOTO limitation score to 28% to show improvements in his overall functional mobility. Ongoing  Garo will report and demonstrate understanding of proper body mechanics with squatting and lifting activities. progressing  Garo will tolerate active cervical range of motion to 25-50% of total range of motion in all planes. MET  Garo will be able to maintain a chin tuck for 10 seconds without increases in pain. progressing  Garo will report being able to tolerate 30 minutes of exercise and cardiovascular activity without increases in his neck pain. met  Garo will improve his distance on the 6MWT to 1616 to show improvements in his ambulatory endurance. (MDC for spinal cord injury = 45.8 m or ~150 feet). Ongoing  Garo will improve his score on the FGA to 18/30 to show improvements in his dynamic balance with gait activities and his fall risk. Ongoing  Garo will tolerate dynamic sitting and standing activities for 45-60 minutes without reported increase in pain. ongoing    PLAN     Updated Certification Period: 4/13/2023 to 5/26/2023   Recommended Treatment Plan: 2 times per week for 6 weeks:  Gait Training, Manual Therapy, Moist Heat/  Ice, Neuromuscular Re-ed, Orthotic Management and Training, Patient Education, Therapeutic Activities, and Therapeutic Exercise  Other Recommendations: continue to progress cervical exercises, dynamic gait/balance training and lower extremity strengthening     Mallory Cutler, PT, DPT  Board-certified Clinical Specialist in Neurologic Physical Therapy

## 2023-05-15 NOTE — PATIENT INSTRUCTIONS
How to Wear and Care for Your Orthosis (Splint)  This splint was custom made for YOU.    When to wear your splint:  Start by wearing your splint for an hour. Then take it off for an hour. Next, try and wear it for 2 hours and then take it off for an hour. This will help you get used to it.   Wear the splint especially at night and during rest periods.   You can wear it more often if you like, as long as it doesn't interfere with function.       How to clean your splint:  Clean the splint with soap and lukewarm water. You can use a small brush to scrub it.  Hand wash the Velcro straps and stockinette with lukewarm soapy water, rinse well & then air dry.      Precautions:  Keep your splint away from open flames and other sources of heat. Do NOT leave it in the window sill or in the car. The heat will cause the splint to change shape and then it wont fit.  Please check periodically to make sure the splint is fitting OK. If you notice any of the following problems, please remove the splint (unless otherwise instructed) and call the therapist right away.   Areas of pressure, sores, blisters, or red marks that do not go away within 20 minutes of removing the splint.  Swelling  Excessive stiffness, pain or numbness    If you have any questions/ concerns, please call your therapist at [phone].

## 2023-05-16 ENCOUNTER — CLINICAL SUPPORT (OUTPATIENT)
Dept: REHABILITATION | Facility: HOSPITAL | Age: 33
End: 2023-05-16
Payer: MEDICAID

## 2023-05-16 DIAGNOSIS — R26.89 ABNORMALITY OF GAIT DUE TO IMPAIRMENT OF BALANCE: Primary | ICD-10-CM

## 2023-05-16 DIAGNOSIS — M24.549 CONTRACTURE OF HAND: ICD-10-CM

## 2023-05-16 DIAGNOSIS — M25.60 DECREASED RANGE OF MOTION: Primary | ICD-10-CM

## 2023-05-16 DIAGNOSIS — R53.1 DECREASED STRENGTH: ICD-10-CM

## 2023-05-16 DIAGNOSIS — Z98.1 S/P CERVICAL SPINAL FUSION: ICD-10-CM

## 2023-05-16 DIAGNOSIS — R27.8 DECREASED COORDINATION: ICD-10-CM

## 2023-05-16 PROCEDURE — 97110 THERAPEUTIC EXERCISES: CPT | Mod: PN

## 2023-05-16 PROCEDURE — 97530 THERAPEUTIC ACTIVITIES: CPT | Mod: PN

## 2023-05-16 NOTE — PROGRESS NOTES
[facility]  Occupational Therapy Treatment Note     Date: 5/16/2023  Name: Phong Fofana  Clinic Number: [id number]    Therapy Diagnosis:   Encounter Diagnoses   Name Primary?    Decreased range of motion Yes    Decreased coordination     Decreased strength     Contracture of hand      Physician: Tobi Levin,     Physician Orders: OT eval and treat   Medical Diagnosis:   Z98.890 (ICD-10-CM) - History of cervical spinal surgery  Evaluation Date: 2/28/2023  Progress Notes Due: 3/31/23, 4/28/23  Plan of Care Expiration Period: 5/26/2023   Insurance Authorization period Expiration: 12/31/2023  Date of Return to MD: 3/20/2023 / Dr. Levin neurosurgery   Visit # / Visits Authorized: 15 / 40 (+eval)  FOTO: 50/100 on intake   56/100 on 4/5/2023    Time In: 11:15 AM   Time Out: 12:00 PM     Total Billable (one on one) Time: 45 minutes     Precautions: Standard, Fall, Seizure, and Post-op cervical fusion  Post-op week 3; Per patient's dad, MD orders are as follows: wear brace to sleep and to eat, try to take brace off when he's sitting up, maybe off a little when he's walking.  Per [facility] Protocol weeks 0-8:   1. Prevent excessive initial mobility or stress on tissues  2. Limit overhead arm movements, bending and lifting.  3. Please follow physician recommendations regarding use of collars etc. (multilevel fusions hard  collar for 6 wks; one-level fusions wear a collar as needed for a week or two)  No prone, no bridging, no lying with a pillow, no active neck movement for a few weeks.     SUBJECTIVE     Pt reports: He is wearing his orthosis to therapy today. He put it on about an hour before he got to therapy.     He was compliant with home exercise program given last session .  Response to previous treatment: no concerns   Functional change:  no significant change compared to previous session per pt report     Pain & Location: 6/10 neck; 4/10 digits on the left and right  "hands   OBJECTIVE     Objective Measures updated at progress report unless specified.    Thumb IP in orthosis = 84 degrees  Thumb MCP in orthosis =52 degrees     Treatment     Garo received the treatments listed below:     Therapeutic activities to improve functional performance for 45 minutes, including:    -UBE x5 min forward/ 5 min back w/ rosi UE, level 2.0, standing, to increase rosi UE act christ and UB strength. He was encouraged to keep rate of perceived exertion (RPE) at "easy to moderate" (3-4/10). Stearns avg=13, peak stearns=18  Pt's reported RPE= 4/10.     Gentle mobilization of joints involving bilateral UE phalanges, metacarpals, carpals, radius and ulna to facilitate increases in passive movement. Gentle stretching of the soft tissues of bilateral wrist and hand to decrease edema and improve PROM, potentially allowing for increases in active movement.      Checked orthosis fit with no concerns.     Left + Right supination and pronation with 2.5 lb weight / 3x10 each exercise, each hand    -difficulty controlling supination with left hand     Left + Right radial deviation with 2.5 lb weight / 3x10 each exercise, each hand.     Left wrist extension AROM 3x10, with 5 lb wrist weight wrapped around hand.    Right wrist extension 3x10 with 5 lb wrist weight wrapped around hand.     Alternate shoulder press to cross body punches 5x10 with 5 lb bilateral weights on hands.     Shoulder abduction 2x10 with 5lb wrist weights wrapped around hands     Doffed wrist weights.     Donned thumb orthosis     Patient Education and Home Exercises      Education provided:   - ed patient & step-dad re: orthotic wear/ care. Dad verbalized understanding of donning orthoses.   - will need help from family for blocking exercises.   - Progress towards goals     Written Home Exercises Provided: yes. Written ed re: orthosis  Exercises were reviewed and Garo was able to demonstrate them prior to the end of the session. Garo " demonstrated good  understanding of the HEP provided. See EMR under Patient Instructions for exercises provided during therapy sessions.       Assessment     Pt would continue to benefit from skilled OT. No concerns with orthosis fit.  Range of motion completed on both hands. Participated in strengthening of upper extremities today with great effort. Min verbal cues for form during exercises. Will need to continue addressing range of motion exercises and low-load prolonged stretch (LLPS) for maximum potential functional return. OT will also continue addressing functional participation with modifications as necessary given limitations in hand movements.      Garo is progressing well towards his goals and there are no updates to goals at this time. Pt prognosis is Good.     Pt will continue to benefit from skilled outpatient occupational therapy to address the deficits listed in the problem list on initial evaluation provide pt/family education and to maximize pt's level of independence in the home and community environment.     Pt's spiritual, cultural and educational needs considered and pt agreeable to plan of care and goals.    Anticipated barriers to occupational therapy:  communication; spinal precautions     Goals:  Short Term Goals: 6 weeks   1) Pt will be (I) with initial HEP (progressing 5/16/2023)   2) Pt will be (I) with manual stretching program to increase functional use of bilateral hands (progressing 5/16/2023)   3) Pt will gain 4 lbs of  strength bilaterally for increased participation during functional tasks (progressing 5/16/2023)   4) Pt will be able to open a medication bottle with Mod I 5/5 attempts (progressing 5/16/2023)   5) Cervical range of motion to be assessed once precautions lifted and goal(s) to be added as necessary (progressing 5/16/2023)   6) Pt will complete 10 minutes on the ergometer, standing, level 3.0 for increased standing/activity tolerance (once precautions are lifted)   (progressing 5/16/2023)   7). Pt will wash and dry 10 cups/dishes with mod I.  (progressing 5/16/2023)      Long Term Goals: 12 weeks   1) Mod I - cutting food (progressing 5/16/2023)   2) Mod I - buttons and fasteners (progressing 5/16/2023)   3) Pt will be able to tie his shoes, Mod I, in a timely manner. (progressing 5/16/2023)   4) Pt will increase fine motor coordination, as evidenced by a decrease of 12 seconds bilaterally during 9 Hole Peg test (progressing 5/16/2023)   5) Pt will increase gross motor coordination, as evidenced by an increase of 10 blocks bilaterally during Box and Block. (progressing 5/16/2023)   6) Pt will self report increased ability to turn steering wheel on riding  to successfully cut the grass in one location at his residence (front or back yard). (progressing 5/16/2023)      Additional functional goals to be added as pt progresses and per his priorities.     PLAN     Certification Period/Plan of care expiration: 2/28/2023 to 5/26/2023.     Outpatient Occupational Therapy 2 times weekly for 12 weeks to include the following interventions: Manual Therapy, Neuromuscular Re-ed, Orthotic Management and Training, Patient Education, Self Care, Therapeutic Activities, and Therapeutic Exercise.     Updates/Grading for next session: continue strengthening Vane Capellan OT

## 2023-05-16 NOTE — PROGRESS NOTES
[facility]   Physical Therapy Treatment Note     Name: Phong Fofana  Clinic Number: [id number]    Therapy Diagnosis:   Encounter Diagnoses   Name Primary?    Abnormality of gait due to impairment of balance Yes    S/P cervical spinal fusion          Physician: Tobi Levin DO    Visit Date: 5/16/2023    Physician Orders: PT Eval and Treat   Medical Diagnosis from Referral:   H/O cervical spine surgery  - Primary     Z98.890    Evaluation Date: 2/13/2023  Authorization Period Expiration: 2/9/2023 - 4/16/23  Updated Plan of Care Expiration: 5/26/2023  Visit # / Visits authorized: 19/20 (20)    PTA Visit #: 0/5     Time In: 1030  Time Out: 1112  Total Billable Time: 42 minutes     Precautions: Standard, Fall, and Post-op cervical fusion  Surgery date: 1/17/23 (Post-op week 16)     SUBJECTIVE     Pt reports: Patient gestured that his neck pain was 5/10 today.       He indicated he was compliant with home exercise program  Response to previous treatment: no problems stated  Functional change: ongoing    Pain: 5/10  Location: neck      OBJECTIVE     Objective Measures updated at progress report unless specified.       Treatment     Garo received the treatments listed below:       Garo received therapeutic exercises to develop strength, endurance, range of motion, and flexibility for 32 minutes including:     Recumbent bike Level 4 x 10  minutes   Supine cervical nods 2 x 10 (slow) repetitions   Supine cervical rotation active range of motion with instructions to perform in pain free range x 10 repetitions   Open book stretching x 5 repetitions each lower extremity    Side-lying hip abduction 2 x 10 repetitions each lower extremity    Seated scapular squeezes, 2 x 10 repetitions   Physical therapist provided gentle manual stretching to bilateral lats and posterior should capsule with stabilization of scapula, 2-3 x 20 seconds each     Garo participated in gait training to improve  functional mobility and safety for 10  minutes, including:    Virtual-reality treadmill training with the following set-up and specifications:  Physical therapist performed skilled set-up for use of virtual reality treadmill including: donning of harness with patient in standing, securing all straps for optimal fit of harness.  Results:    Date Trial Duration Speed Distance Obstacles Navigation Level Handrails Score   5/16/2023  1 5 min 1.5 mph 215 yards 88% Auto  park 1   Variable  259            Patient Education and Home Exercises     Home Exercises Provided and Patient Education Provided     Education provided:   - Patient provided with verbal and demonstrative instruction for all activities performed in today's session.     Written Home Exercises Provided: Patient instructed to cont prior HEP.  See EMR under Patient Instructions for exercises provided during therapy sessions.    ASSESSMENT     Garo provided good participation and effort during today's session. Patient was agreeable to trial virtual reality treadmill for dynamic gait training today. Patient did well with training with no reports of discomfort. He negotiated obstacles (hurdles, puddles) with 88% accuracy. Reports increase in pain with performance of open book stretching, but indicated decrease in pain with slight cervical rotation. He reports left upper extremity numbness with left posterior capsule and lat stretching. Will continue to monitor response to exercises in session and modify as needed. He will benefit from continued skilled physical therapy intervention to address goals below and assist in return to PLOF.         Garo Is progressing towards his goals.   Pt prognosis is Good.     Pt will continue to benefit from skilled outpatient physical therapy to address the deficits listed in the problem list box on initial evaluation, provide pt/family education and to maximize pt's level of independence in the home and community environment.      Pt's spiritual, cultural and educational needs considered and pt agreeable to plan of care and goals.     Anticipated barriers to physical therapy: None    Goals:   Short Term Goals: 4 weeks   Garo will report less difficulty with moderate activities like moving a table or pushing a vacuum on her FOTO to show improvements with his function. Ongoing.  Garo will perform 6 Minute walk test to assess ambulatory endurance and establish appropriate goals. Met  Garo will perform Functional Gait Assessment to assess dynamic balance and fall risk with walking in the home and community. Met  Garo will report being able to tolerate 15 minutes of exercise and cardiovascular activity without increases in his neck pain. Met     Long Term Goals: 8 weeks   Garo will improve is FOTO limitation score to 28% to show improvements in his overall functional mobility. Ongoing  Garo will report and demonstrate understanding of proper body mechanics with squatting and lifting activities. progressing  Garo will tolerate active cervical range of motion to 25-50% of total range of motion in all planes. MET  Garo will be able to maintain a chin tuck for 10 seconds without increases in pain. progressing  Garo will report being able to tolerate 30 minutes of exercise and cardiovascular activity without increases in his neck pain. met  Garo will improve his distance on the 6MWT to 1616 to show improvements in his ambulatory endurance. (MDC for spinal cord injury = 45.8 m or ~150 feet). Ongoing  Garo will improve his score on the FGA to 18/30 to show improvements in his dynamic balance with gait activities and his fall risk. Ongoing  Garo will tolerate dynamic sitting and standing activities for 45-60 minutes without reported increase in pain. ongoing    PLAN     Updated Certification Period: 4/13/2023 to 5/26/2023   Recommended Treatment Plan: 2 times per week for 6 weeks:  Gait Training, Manual Therapy, Moist Heat/ Ice,  Neuromuscular Re-ed, Orthotic Management and Training, Patient Education, Therapeutic Activities, and Therapeutic Exercise  Other Recommendations: continue to progress cervical exercises, dynamic gait/balance training and lower extremity strengthening     Mallory Cutler, PT, DPT  Board-certified Clinical Specialist in Neurologic Physical Therapy

## 2023-05-19 ENCOUNTER — CLINICAL SUPPORT (OUTPATIENT)
Dept: REHABILITATION | Facility: HOSPITAL | Age: 33
End: 2023-05-19
Payer: MEDICAID

## 2023-05-19 DIAGNOSIS — R47.1 DYSARTHRIA: Primary | ICD-10-CM

## 2023-05-19 PROCEDURE — 92608 EX FOR SPEECH DEVICE RX ADDL: CPT | Mod: PN

## 2023-05-19 PROCEDURE — 92607 EX FOR SPEECH DEVICE RX 1HR: CPT | Mod: PN

## 2023-05-24 ENCOUNTER — CLINICAL SUPPORT (OUTPATIENT)
Dept: REHABILITATION | Facility: HOSPITAL | Age: 33
End: 2023-05-24
Payer: MEDICAID

## 2023-05-24 DIAGNOSIS — Z98.1 S/P CERVICAL SPINAL FUSION: ICD-10-CM

## 2023-05-24 DIAGNOSIS — R27.8 DECREASED COORDINATION: ICD-10-CM

## 2023-05-24 DIAGNOSIS — R53.1 DECREASED STRENGTH: ICD-10-CM

## 2023-05-24 DIAGNOSIS — M25.60 DECREASED RANGE OF MOTION: Primary | ICD-10-CM

## 2023-05-24 DIAGNOSIS — M24.549 CONTRACTURE OF HAND: ICD-10-CM

## 2023-05-24 DIAGNOSIS — R26.89 ABNORMALITY OF GAIT DUE TO IMPAIRMENT OF BALANCE: Primary | ICD-10-CM

## 2023-05-24 PROCEDURE — 97530 THERAPEUTIC ACTIVITIES: CPT | Mod: PN

## 2023-05-24 PROCEDURE — 97110 THERAPEUTIC EXERCISES: CPT | Mod: PN,CQ

## 2023-05-24 NOTE — PROGRESS NOTES
[facility]  Occupational Therapy Treatment Note     Date: 5/24/2023  Name: Phong Fofana  Clinic Number: [id number]    Therapy Diagnosis:   Encounter Diagnoses   Name Primary?    Decreased range of motion Yes    Decreased coordination     Decreased strength     Contracture of hand      Physician: Tobi Levin DO    Physician Orders: OT eval and treat   Medical Diagnosis:   Z98.890 (ICD-10-CM) - History of cervical spinal surgery  Evaluation Date: 2/28/2023  Progress Notes Due: 3/31/23, 4/28/23  Plan of Care Expiration Period: 5/26/2023   Insurance Authorization period Expiration: 12/31/2023  Date of Return to MD: 3/20/2023 / Dr. Levin neurosurgery   Visit # / Visits Authorized: 16 / 40 (+eval)  FOTO: 50/100 on intake   56/100 on 4/5/2023    Time In: 11:15 AM   Time Out: 12:00 PM     Total Billable (one on one) Time: 45 minutes     Precautions: Standard, Fall, Seizure, and Post-op cervical fusion  Post-op week 3; Per patient's dad, MD orders are as follows: wear brace to sleep and to eat, try to take brace off when he's sitting up, maybe off a little when he's walking.  Per [place] [facility] Protocol weeks 0-8:   1. Prevent excessive initial mobility or stress on tissues  2. Limit overhead arm movements, bending and lifting.  3. Please follow physician recommendations regarding use of collars etc. (multilevel fusions hard  collar for 6 wks; one-level fusions wear a collar as needed for a week or two)  No prone, no bridging, no lying with a pillow, no active neck movement for a few weeks.     SUBJECTIVE     Pt reports: No complaints today. His ortho appointment is in 6 days.     He was compliant with home exercise program given last session .  Response to previous treatment: no concerns   Functional change:  no significant change compared to previous session per pt report     Pain & Location: 2/10 neck; 5/10 digits on the left and right hands   OBJECTIVE     Objective Measures  "updated at progress report unless specified.    Treatment     aGro received the treatments listed below:     Therapeutic activities to improve functional performance for 45 minutes, including:    -UBE x5 min forward/ 5 min back w/ rosi UE, level 2.2, standing, to increase rosi UE act christ and UB strength. He was encouraged to keep rate of perceived exertion (RPE) at "easy to moderate" (3-4/10). Stearns avg=14, peak stearns=20  Pt's reported RPE= 4/10.     Gentle mobilization of joints involving bilateral UE phalanges, metacarpals, carpals, radius and ulna to facilitate increases in passive movement. Gentle stretching of the soft tissues of bilateral wrist and hand to decrease edema and improve PROM, potentially allowing for increases in active movement.      Left + Right supination and pronation with 5 lb weight / 3x15 each exercise, each hand     Left wrist extension AROM 3x15, with 5 lb wrist weight wrapped around hand.    Right wrist extension 3x15 with 5 lb wrist weight wrapped around hand.     Alternate shoulder press to cross body punches with 5 lb bilateral weights on hands while ambulating around clinic.     Shoulder abduction 3x10 with 5lb wrist weights wrapped around hands     Doffed wrist weights.     Donned thumb orthosis     Patient Education and Home Exercises      Education provided:   - ed patient & step-dad re: orthotic wear/ care. Dad verbalized understanding of donning orthoses.   - will need help from family for blocking exercises.   - Progress towards goals     Written Home Exercises Provided: yes. Written ed re: orthosis  Exercises were reviewed and Garo was able to demonstrate them prior to the end of the session. Garo demonstrated good  understanding of the HEP provided. See EMR under Patient Instructions for exercises provided during therapy sessions.       Assessment     Pt would continue to benefit from skilled OT.  Range of motion completed on both hands. Participated in strengthening of " upper extremities today with great effort. Min verbal cues for form and to slow and control during exercises. Upgraded all exercises to 5 lb weights. Will need to continue addressing range of motion exercises and low-load prolonged stretch (LLPS) for maximum potential functional return. OT will also continue addressing functional participation with modifications as necessary given limitations in hand movements.      Garo is progressing well towards his goals and there are no updates to goals at this time. Pt prognosis is Good.     Pt will continue to benefit from skilled outpatient occupational therapy to address the deficits listed in the problem list on initial evaluation provide pt/family education and to maximize pt's level of independence in the home and community environment.     Pt's spiritual, cultural and educational needs considered and pt agreeable to plan of care and goals.    Anticipated barriers to occupational therapy:  communication; spinal precautions     Goals:  Short Term Goals: 6 weeks   1) Pt will be (I) with initial HEP (progressing 5/24/2023)   2) Pt will be (I) with manual stretching program to increase functional use of bilateral hands (progressing 5/24/2023)   3) Pt will gain 4 lbs of  strength bilaterally for increased participation during functional tasks (progressing 5/24/2023)   4) Pt will be able to open a medication bottle with Mod I 5/5 attempts (progressing 5/24/2023)   5) Cervical range of motion to be assessed once precautions lifted and goal(s) to be added as necessary (progressing 5/24/2023)   6) Pt will complete 10 minutes on the ergometer, standing, level 3.0 for increased standing/activity tolerance (once precautions are lifted)  (progressing 5/24/2023)   7). Pt will wash and dry 10 cups/dishes with mod I.  (progressing 5/24/2023)      Long Term Goals: 12 weeks   1) Mod I - cutting food (progressing 5/24/2023)   2) Mod I - buttons and fasteners (progressing 5/24/2023)    3) Pt will be able to tie his shoes, Mod I, in a timely manner. (progressing 5/24/2023)   4) Pt will increase fine motor coordination, as evidenced by a decrease of 12 seconds bilaterally during 9 Hole Peg test (progressing 5/24/2023)   5) Pt will increase gross motor coordination, as evidenced by an increase of 10 blocks bilaterally during Box and Block. (progressing 5/24/2023)   6) Pt will self report increased ability to turn steering wheel on riding  to successfully cut the grass in one location at his residence (front or back yard). (progressing 5/24/2023)      Additional functional goals to be added as pt progresses and per his priorities.     PLAN     Certification Period/Plan of care expiration: 2/28/2023 to 5/26/2023.     Outpatient Occupational Therapy 2 times weekly for 12 weeks to include the following interventions: Manual Therapy, Neuromuscular Re-ed, Orthotic Management and Training, Patient Education, Self Care, Therapeutic Activities, and Therapeutic Exercise.     Updates/Grading for next session: continue strengthening Vane Capellan OT

## 2023-05-24 NOTE — PROGRESS NOTES
[facility]   Physical Therapy Treatment Note     Name: Phong Fofana  [facility] Number: [id number]    Therapy Diagnosis:   Encounter Diagnoses   Name Primary?    Abnormality of gait due to impairment of balance Yes    S/P cervical spinal fusion          Physician: Tobi Levin DO    Visit Date: 5/24/2023    Physician Orders: PT Eval and Treat   Medical Diagnosis from Referral:   H/O cervical spine surgery  - Primary     Z98.890    Evaluation Date: 2/13/2023  Authorization Period Expiration: 2/9/2023 - 4/16/23  Updated Plan of Care Expiration: 5/26/2023  Visit # / Visits authorized: 19/20 (20)    PTA Visit #: 1/5     Time In: 1025  Time Out: 1110  Total Billable Time: 45 minutes     Precautions: Standard, Fall, and Post-op cervical fusion  Surgery date: 1/17/23 (Post-op week 16)     SUBJECTIVE     Pt reports: Reports continued neck soreness.  He indicated he was compliant with home exercise program  Response to previous treatment: no problems stated  Functional change: ongoing    Pain: 4/10  Location: neck      OBJECTIVE     Objective Measures updated at progress report unless specified.     Treatment     Garo received the treatments listed below:       Garo received therapeutic exercises to develop strength, endurance, range of motion, and flexibility for 15 minutes including:     Recumbent bike Level 4.5 (self selected) 10 minutes    Seated:  Cervical range of motion 10 times: rotation Right Left, side bend Right Left     Supine cervical rotation active range of motion with instructions to perform in pain free range x 10 repetitions   Open book stretching x 5 repetitions each lower extremity       neuromuscular re-education activities to improve: Balance, Coordination, Proprioception and posture for 20 minutes. The following activities were included:    Standing Pulleys:  Bilateral shoulder rows 35# 20 times  Bilateral shoulder retro rolls 20 times    Parallel bars:  Forward  lunge onto BOSU round 2 minutes Right Left alternating  Side step up/over BOSU round 2 minutes Right Left   Step up/over 5 inch box 20 times Right Left     Garo participated in gait training to improve functional mobility and safety for 10  minutes, includin minute walk test 1563 feet    (Activity time includes skilled set-up and positioning as well as therapeutic rest)    Patient Education and Home Exercises     Home Exercises Provided and Patient Education Provided     Education provided:   - Patient provided with verbal and demonstrative instruction for all activities performed in today's session.     Written Home Exercises Provided: Patient instructed to cont prior HEP.  See EMR under Patient Instructions for exercises provided during therapy sessions.    ASSESSMENT     Garo provided good participation and effort during today's session with treatment focused on lower extremity strengthening, gait endurance/speed and neuromuscular re-education. Garo tolerated activities with rest breaks, shortened step length with ambulation with decreased Bilateral toe push off.    Garo Is progressing towards his goals.   Pt prognosis is Good.     Pt will continue to benefit from skilled outpatient physical therapy to address the deficits listed in the problem list box on initial evaluation, provide pt/family education and to maximize pt's level of independence in the home and community environment.     Pt's spiritual, cultural and educational needs considered and pt agreeable to plan of care and goals.     Anticipated barriers to physical therapy: None    Goals:   Short Term Goals: 4 weeks   Garo will report less difficulty with moderate activities like moving a table or pushing a vacuum on her FOTO to show improvements with his function. Ongoing.  Garo will perform 6 Minute walk test to assess ambulatory endurance and establish appropriate goals. Met  Garo will perform Functional Gait Assessment to assess dynamic  balance and fall risk with walking in the home and community. Met  Garo will report being able to tolerate 15 minutes of exercise and cardiovascular activity without increases in his neck pain. Met     Long Term Goals: 8 weeks   Garo will improve is FOTO limitation score to 28% to show improvements in his overall functional mobility. Ongoing  Garo will report and demonstrate understanding of proper body mechanics with squatting and lifting activities. progressing  Garo will tolerate active cervical range of motion to 25-50% of total range of motion in all planes. MET  Garo will be able to maintain a chin tuck for 10 seconds without increases in pain. progressing  Garo will report being able to tolerate 30 minutes of exercise and cardiovascular activity without increases in his neck pain. met  Garo will improve his distance on the 6MWT to 1616 to show improvements in his ambulatory endurance. (MDC for spinal cord injury = 45.8 m or ~150 feet). Progressing (1563 feet 5/24/23)  Garo will improve his score on the FGA to 18/30 to show improvements in his dynamic balance with gait activities and his fall risk. Ongoing  Garo will tolerate dynamic sitting and standing activities for 45-60 minutes without reported increase in pain. ongoing    PLAN     Updated Certification Period: 4/13/2023 to 5/26/2023     Recommended Treatment Plan: 2 times per week for 6 weeks:  Gait Training, Manual Therapy, Moist Heat/ Ice, Neuromuscular Re-ed, Orthotic Management and Training, Patient Education, Therapeutic Activities, and Therapeutic Exercise    Dorothea Carver, Physical Therapist Assistant

## 2023-05-29 NOTE — PLAN OF CARE
Outpatient Neurological Rehabilitation  Augmentative and Alternative Communication Evaluation     Date:  5/19/2023   Start Time: 0800  Stop Time:  0917        Name: Phong Fofana   MRN: [id number]    Therapy Diagnosis:   Encounter Diagnosis   Name Primary?    Dysarthria Yes    Physician: Erick Walsh MD  Physician Orders: Ambulatory Referral to Speech Therapy   Medical Diagnosis: dysarthria    Visit #:  1  Visits Authorized: 1  Date of Evaluation:  5/19/2023   Insurance Authorization Period: 4/25/2023 to 4/26/2024  Plan of Care Certification:    5/19/2023 to 8/11/2023     Procedure Min.   SP EVAL ALT COM DEV 1HR [43516 (CPT®)]  60   SP EVAL A/C DEV ADD 30M [49919 (CPT®)]  18     Total Minutes: 0800  Total Timed Units: 0917  Charges Billed/# of units: 2  Precautions:Standard     Subjective   Date of Onset: April 2022  History of Current Condition:  Phong Fofana is a 33 y.o. male  who presents to [facility] for evaluation secondary to s/p cervical fusion. Patient was referred to therapy by Tobi Levin DO , which is the patient's neurosurgeon. The patient in nonverbal since end of April 2022.He arrived to the evaluation alone. His father came the next morning to aid in supplementing the history which was gathered through the chart review:      Per Physical Therapy eval:  In April 2022 Garo's parent's got a phone call that he was in the hospital in [place]. He was found on the side of the road after having a seizure and possible stroke. He has a history of prior seizures that was not disclosed to his family. He moved back to [place] at the end of April and gradually developed weakness and tone in both arms and foot drop in his right foot.  He has been getting medical care and established a PCP. Family still hasn't figured out why he lost his voice however he can text and is cognitively intact. He got an MRI a few weeks ago and saw changes in his cervical  spine. Ultimately, he underwent surgery to remove compression on his spinal cord. He is able to walk with less gait deviations (no more foot drop). He used to be hypersensitive (pain 15/10 with light touch to different parts of his body) but this has improved and is about 2-3/10 mostly in his neck due to his surgery. His family wants to know what's going on with his voice and wants to improve his hand function. He still has light sensitivity and flourescent lights bother him more than regular lights. He reports weakness in his hands, knees and hips but foot drop has gotten better. No falls or near falls recently. He is just now starting to get more comfortable walking around and getting back into daily activities but he is still a little nervous to take brace off when walking because he doesn't want to mess up his surgery.      After speaking with father- patient's speech was dysarthric after the seizures, but was mostly intelligible; however, this slowly declines over 3 weeks and since he has been unable to speak in 11 months.   Past Medical History: Phong Fofana  has a past medical history of Abnormal gait, Anxious depression, Back pain, Bipolar disorder, unspecified, Contracture of hand, Depression, Migraines, Neck pain, Nonverbal, Photosensitivity, Schizophrenia, unspecified, and Seizures.  Phong Fofana  has a past surgical history that includes Tympanostomy tube placement; Wrist surgery (Left); Spine surgery; Fusion of cervical spine by posterior approach (N/A, 1/17/2023); and Cervical laminectomy with spinal fusion (N/A, 1/17/2023).  Medical Hx and Allergies: Phong has a current medication list which includes the following prescription(s): baclofen, cefadroxil, gabapentin, hydrocodone-acetaminophen, levetiracetam, endocet, oxycodone-acetaminophen, and quetiapine. Review of patient's allergies indicates:  No Known Allergies  Prior Therapy:  no prior Speech Therapy evaluation- Physical Therapy  and Occupational Therapy outpatient at [facility]   Social History:  Patient lives with parents in [place], Patient is not currently driving  Prior Level of Function: independent this time last year   Current Level of Function: unable to verbally communicate   Pain: 0/10  Pain Location / Description: no pain indicated throughout session   Patient Therapy Goals:  functional communication   Objective   Augmentative/Alternative Communication (AAC) Evaluation:  Language Skills:   Auditory Comprehension: Informally judged to be WNL. Pt was able to hold a conversation with ease and follow multi-step commands.   Verbal Expression: Unable to assess as patient presents with such severe dysarthria, no usable verbal expression noted   Reading Comprehension: informally assessed to be WFL   Written Expression: spelling is 100% accurate with use of spelling prediction when needed    Cognition: informally assessed to be within functional limits. Patient uses text to remind family and therapists of needed deadlines, he is able to attend to device independently. No concerns regarding cognitive functioning     Motor Speech Skills: He presents with severe (likely Flaccid, but possibly spastic) dysarthria characterized by 0% intelligibility and no useable speech approximations within the session. He has been nonverbal for 11 months without speech intervention.     Hearing / Vision: Wears glassess. Informally assessed to be WFL for hearing      Physical Status: independent for ambulation. Despite minimal flexion contracture, he is able to point and isolate a single digit.  Patient possesses the physical abilities to effectively use a SGD and required accessories to communicate.     Specific Daily - Functional Communication Needs:  Phong Fofana's ability to communicate was limited due the severity of his dysarthria and his inability to be understood by unfamiliar listeners.     Garo  must communicate regularly with family, therapists,  nurses, and/or caregivers regarding daily activities, personal needs, medical needs, and social interactions in all environments such as home, doctor visits, and in the community. He needs to communicate messages to convey basic needs to various caregivers in meeting these needs.Garo must communicate with his family to discuss medical decisions and also needs to report medical conditions to medical and nursing personnel. Garo must communicate messages to express his needs, make requests, ask questions, offer information, express opinions/feelings and provide information to medical personnel.     Ability to Meet Communication Needs without a Speech Generating Device (SGD)     Phong Fofana 's speech does not allow him to meet his daily functional communication needs. Low-tech solutions such as communication boards or books (with pictures or words) do not allow him to meet daily communication needs because he would be unable to independently communicate with people across the room, summon people from another room, or communicate over the phone which is required for living independently (coordinating transportation, scheduling appointments, and refilling medications).   A low tech board does not allow  Garo  to summon help from someone in another room, or be heard over the phone.     Message Characteristics/Features  Garo  demonstrates the need for phrase based messages, text based messages, and access to a keyboard to generate novel messages.  Letters and single words allow him to communicate regarding specific and novel topics.  Phrases and sentences allow him to communicate more routine utterances more efficiently.      The SGD must have the ability to store a large number of pre-programmed messages regarding a variety of topics.  Rate enhancement techniques such as word prediction are required to allow Garo to communicate quickly and easily without having to spell everything out. All of these features  "allow a communication device to be customized for each individual.  This is important to allow Garo  to communicate his needs without assistance.     Input Features  Given the current status of physical skills, He  requires an SGD that will allow for the use of Touch.  He needs a device that will allow him  to program it using touch access with ease of programing.     Output Features  Garo's can to spell and formulate sentences, he  requires a device with synthesized speech to allow him to be the most natural communicator possible.     Other Features  - The SGD must be able to be easily understood over the phone.  - The SGD must have an adequate battery life to allow for use throughout the day with minimal or not recharging (except at night).     Recommended Medicare Device Category   The features identified above as being required for Garo  to be a functional communicator describe the SGD type in the 2510 device category (i.e. synthesized speech, message formulation via spelling and other methods, multiple access methods.)     Detailed Description of Trials with Various SGDs  Garo participated in a 77 minute evaluation to formally assess the use of a communication device. He  was introduced to three SGDs which offer word/picture displays and voice output.    The [employer] TalkPad (Talk Pad 10) was trialed.  It has 10" (diagonal) display and weighs 2.47 pounds. It is a communication device by [employer] built on the LinPrim platform. The device comes with Grid 3 and Alpha Core Grid software. The TalkPad includes a sturdy handle and stand, lightweight and portable design, antiglare display, and an eternal bluetooth speaker to aid in extended the volume of the device.  It has a 10 hours battery life. It features synthesized speech. The device was accessed via Touch  access.  He  was able to locate pre-stored sentences as well as spell on the keyboard of this device. However, the patient was noticeably slower on this " "software, he did not like it as much as the Snap core, and it was on an IOS system, which he is not as familiar with. He also did not like that there was an extra step involved in accessing stored phrases, which gualberto contributed to slowing him down.    LTAC, located within [facility] Accent 1000 with Ruff[employer] family of language systems Essence and Word Power language software was trialed. The the [facility] Accent 1000 comes with a 10" touch screen display and is 3.0 pounds. It has a 10 - 12 hour battery life. The Accent (all models) features a variety of voices for digitized and synthesized speech generation. It uses a combination of core and fringe vocabulary featuring  text based  and phrase based  language system. The device was accessed via Touch access method .  He   was able to locate pre-stored sentences as well as spell on the keyboard of this device. He  also was able to effectively use its extensive pre-programmed vocabulary and use of rate-enhancements such as word prediction. On use of this device, it appeared less intuitive to the patient. He rated the software a 5/10.  Examples of communicative messages used were: "this voice would drive anyone crazy".    [employer] TD  with TD Snap (no eye gaze utilized as it was not needed). TD  is a medically certified device for Apples iPadOS accessibility features, enabling tasks such as tapping or scrolling using your eyes, fingers or a switch. The device also offers a rear-facing Partner Window for more natural face-to-face conversations.The device is portable and has a 10 hour battery life. It has a 12 inch screen size and weighs about 4 pounds. It has an extensive pre-programmed vocabulary and use of rate-enhancements such as word prediction. It also offers word and phrase sets and allows the user to navigate through pages. The device was accessed via Touch. This was deemed not the appropriate device for Garo as he prefers android or windows as compared to IOS. He is not " "as familiar with IOS and it would not be as comparable with his other devices limiting his ability to integrate with his phone. His lack of familiarity with IOS based systems means he would also have a harder time with programing and settings.     The Tobii Dynavox I-110  was trialed with TD Snap It comes with a 10" screen. This robust device is built for hard knocks, featuring an integrated crash case and a Gorilla Glass 10" touch screen. It's ready for rain showers and spills with a water-resistant design. The I-110 device  is portable and has a 10 hour battery life. It has an extensive pre-programmed vocabulary and use of rate-enhancements such as word prediction. It also offers word and phrase sets and allows the user to navigate through pages.  The device was accessed via Touch access method most effectively. The text page setwas deemed most appropriate. The aphasia page set was also trialed; however, this would likely limit the patient in his communication more than support it given the speed and accuracy of his typing.  Phong Fofana  most effectively used the The Tobii Dynavox I-110 due to its portability with TD Snap language software.  He   was able to locate pre-stored sentences as well as spell on the keyboard of this device. He  also was able to effectively use its extensive pre-programmed vocabulary and use of rate-enhancements such as word prediction. He was able to edit with minimal cues. He would also benefit from the gorilla glass as also stated this (through use of the device) to me. Examples of communicative messages used were: "They changed my medicines." "Rate an 8/10" I like this one the most" "I think I am fastest on this one" .    Recommendation of Tobii Dynovox I-110 with Snapcore is not a matter of convenience, but, rather a matter of medical necessity as the other devices trialed did not allow client communicative success.  Without the receipt of the Tobii Dynovox I-110  SGD, it is " highly likely that the client would abandon any other speech generating device, as it proves not useful for him  to communicate critical information for his health and well-being. When client was utilizing/trialing other devices, client was not as efficient in communicating his medical needs.    Patient/Family support of SGD  Garo demonstrated both the skill and the motivation to use the SGD type.  Garo will be responsible for the care, programming and troubleshooting of his SGD. Garo has been informed about free training sessions and assistance provided by SGD local representatives, phone-based technical support, and on-line technical support.     Education: Plan of Care, role of SLP in care, and scheduling/ cancellation policy was discussed with the patient. Patient and family members expressed understanding.     Assessment   Impairment Type and Severity:  Phong Fofana  is 33 y.o.  with a medical diagnosis of dysarthria . He  lives with his parents who serve as his  primary caregiver. Phong Fofana presents with diagnosis characterized by severe (likely Flaccid, but possibly spastic) dysarthria characterized by 0% intelligibility and no useable speech approximations within the session. Some vocalization noted, but severely decreased articulatory kinematics. Hypernasality with nasal emissions noted on attempts to vocalize. Garo 's speech does not allow him to meet the daily functional communication needs due to the severity of his dysarthria. His speech is 100% unintelligible. Low tech solutions such as communication boards (with pictures or words) do not allow Garo to meet daily communication needs because they are limiting in opportunities to communicate various wants, needs, and opinions in a dynamic way.  Garo can write; however, written expression would not allow Garo  to summon help from another room or be understood over the phone. Garo trialed various SGD's and decided that the [employer]  Dynovox I-110 with accessories (strap and carrying case) via Touch access method would be the most effective device given a feature match for easy access and portability. The Tobii Dynovox I-110  SGD will improve his ability to functionally communicate his medical wants and needs and participate in his functional living environment. He would benefit from a SGD to decreased frustration associated with communication, increased lexical variety, increase independence with ADL's and increase ability to communicate in the case of an emergency. Positive prognostic factors include family support and prior level of functioning. He is also tech marley and took to the communication devices nearly independently. Negative prognostic factors include time since onset (on return of speech), no negative prognostic factors for use of AAC. Barriers to progress include none.     Anticipated Course of Impairment    At this point, there has been no improvement in his speech and I do not anticipate speech to return to usable intelligibility.    Rehab Potential: excellent with use of speech generating device.   Pt's spiritual, cultural and educational needs considered and pt agreeable to plan of care and goals.    Short Term Goals (8 weeks):   1. Given a specific message to find, patient will independently navigate to the correct page on 8/10 attempts.   2. Patient will demonstrate comprehension of basic maintenance and operations (on-off, adjusting volume) of the SGD on 5/5 attempts.   3. Patient will communicate personal and medical needs, feelings, and opinions and make requests to familiar and unfamiliar communication partners independently with on 9/10 attempts.     Long Term Goals (12 weeks):   1. Christopher L Fofana will use the SGD to efficiently interact with familiar and unfamiliar communication partners to improve functional communication.   2. Christopher L Fofana  will use the SGD to express medical emergency situations or health  related information independently to communication partners to improve functional communication.    Plan       Recommended Treatment Plan:  2 times per week to address AAC device training and language deficits. Additionally, it is recommended that Garo's  family and /or caregivers receive approximately  2 hours of training regarding the care, use, and programming of the device (within 4 weeks ).     SGD and Accessories Recommended     The following is recommended for Phong Fofana  :  Tobii Dynovox I-110 with SnapCore ()  Touch access ()  Carrying case and strap ()       Physician Involvement Statement     A copy of this report, including treatment plan and goals, has been forwarded to Phong Fofana's  treating physician prior to ordering the Tobii Dynovox I-110  and components.                                      The undersigned Speech/Language Pathologist has no financial relationship with nor will receive any financial gain from the supplier of this device.     Gina Garrido CCC-SLP   Speech Language Pathologist  5/19/2023      Therapist's Name:   _________________________________________  Date: 5/19/2023      I certify the need for these services furnished under this plan of treatment and while under my care.  ____________________________________ Physician/Referring Practitioner   Date of Signature

## 2023-06-02 ENCOUNTER — DOCUMENTATION ONLY (OUTPATIENT)
Dept: REHABILITATION | Facility: HOSPITAL | Age: 33
End: 2023-06-02
Payer: MEDICAID

## 2023-07-06 ENCOUNTER — CLINICAL SUPPORT (OUTPATIENT)
Dept: REHABILITATION | Facility: HOSPITAL | Age: 33
End: 2023-07-06
Payer: MEDICAID

## 2023-07-06 DIAGNOSIS — M25.60 DECREASED RANGE OF MOTION: Primary | ICD-10-CM

## 2023-07-06 DIAGNOSIS — R26.89 ABNORMALITY OF GAIT DUE TO IMPAIRMENT OF BALANCE: Primary | ICD-10-CM

## 2023-07-06 DIAGNOSIS — M24.549 CONTRACTURE OF HAND: ICD-10-CM

## 2023-07-06 DIAGNOSIS — Z98.1 S/P CERVICAL SPINAL FUSION: ICD-10-CM

## 2023-07-06 DIAGNOSIS — R27.8 DECREASED COORDINATION: ICD-10-CM

## 2023-07-06 DIAGNOSIS — R53.1 DECREASED STRENGTH: ICD-10-CM

## 2023-07-06 PROCEDURE — 97530 THERAPEUTIC ACTIVITIES: CPT | Mod: PN

## 2023-07-06 PROCEDURE — 97110 THERAPEUTIC EXERCISES: CPT | Mod: PN

## 2023-07-06 NOTE — PROGRESS NOTES
Occupational Therapy  Updated Plan of Care    Phong Fofana  MRN: [id number]    Plan of care update completed. Please see attached plan of care for details. Thank you for consult!    Fernanda Caplelan MS, OTR/L   7/6/2023

## 2023-07-06 NOTE — PLAN OF CARE
[facility]  Occupational Therapy Treatment Note and Plan of Care Update     Date: 7/6/2023  Name: Phong Fofana  Clinic Number: [id number]    Therapy Diagnosis:   Encounter Diagnoses   Name Primary?    Decreased range of motion Yes    Decreased coordination     Decreased strength     Contracture of hand      Physician: Tobi Levin DO    Physician Orders: OT eval and treat   Medical Diagnosis:   Z98.890 (ICD-10-CM) - History of cervical spinal surgery  Evaluation Date: 2/28/2023  Progress Notes Due: 3/31/23, 4/28/23  Plan of Care Expiration Period: 5/26/2023; 8/31/2023  Insurance Authorization period Expiration: 12/31/2023  Date of Return to MD: 3/20/2023 / Dr. Levin neurosurgery   Visit # / Visits Authorized: 17 / 40 (+eval)  FOTO: 50/100 on intake   56/100 on 4/5/2023    Time In: 11:20 AM   Time Out: 12:00 PM     Total Billable (one on one) Time: 40  minutes     Precautions: Standard, Fall, Seizure    SUBJECTIVE     Pt reports: Complain of neck stiffness and pain. His ortho appointment was cancelled by the doctor and was rescheduled to 7/11/2023. He reports that cooking, cleaning, tying his shoes, buttoning his pants, and squeezing bottles in the shower is hard for him to do because of his hands.     He was compliant with home exercise program given last session .  Response to previous treatment: no concerns   Functional change:  no significant change compared to previous session per pt report     Pain & Location: 6/10 neck; 5/10 digits on the left and right hands   OBJECTIVE     Objective Measures updated at progress report unless specified.    Cervical range of motion  Left rotation: 26 degrees   Right rotation: 24 degrees   Extension: 18 degrees   Flexion: 10 degrees   Left lateral flexion: 16 degrees  Right lateral flexion: 20 degrees     Bilateral hand range of motion    Left hand: extended digits as much as he can (still all in flexion)     Thumb MCP: 55 degrees  Thumb IP:  94 degrees     2nd MCP :59 degrees  2nd PIP: 90 degrees   2nd DIP:180 degrees      3rd MCP :65 degrees   3rd PIP: 90 degrees  3rd DIP: 26 degrees hyperextension      4th MCP :78 degrees   4th PIP: 86 degrees    4th DIP: 22 degrees hyperextension      5th MCP :65 degrees   5th PIP: 88 degrees  5th DIP: 30 degrees hyperextension      Right hand: extended digits (as much as he can)      Thumb MCP:  56 degrees   Thumb IP: 91 degrees      2nd MCP :WNL   2nd PIP: WNL   2nd DIP: WNL     3rd MCP :WNL   3rd PIP: WNL   3rd DIP: 15 degrees hyperextension      4th MCP :19 degrees hyperextension   4th PIP: 44 degrees  4th DIP: 19 degrees hyperextension      5th MCP :20 degrees hyper extension   5th PIP: 73 degrees   5th DIP: 22 degrees hyperextension     Treatment     Garo received the treatments listed below:     Therapeutic activities to improve functional performance for 40 minutes, including:    See measurements above     Patient Education and Home Exercises      Education provided:   - ed patient & step-dad re: orthotic wear/ care. Dad verbalized understanding of donning orthoses.   - will need help from family for blocking exercises.   - Progress towards goals     Written Home Exercises Provided: yes. Written ed re: orthosis  Exercises were reviewed and Garo was able to demonstrate them prior to the end of the session. Garo demonstrated good  understanding of the HEP provided. See EMR under Patient Instructions for exercises provided during therapy sessions.       Assessment     Pt would continue to benefit from skilled OT. According to measurements taken today. Subhash presents with severely decreased range of motion in his cervical spine and bilateral digits. He is also experiencing pain in both that frequently increases above 5/10. Due to the contracture in his hands, he is unable to tie his shoes, button his pants, cook, clean, and squeeze bottles in the shower. He is set to see ortho on 7/11/2023 after two facility  cancelled appointments. Will extend plan of care for 8 additional weeks to address range of motion and pain.     Garo is progressing well towards his goals and there are no updates to goals at this time. Pt prognosis is Good.     Pt will continue to benefit from skilled outpatient occupational therapy to address the deficits listed in the problem list on initial evaluation provide pt/family education and to maximize pt's level of independence in the home and community environment.     Pt's spiritual, cultural and educational needs considered and pt agreeable to plan of care and goals.    Anticipated barriers to occupational therapy:  communication; spinal precautions     Goals:  Short Term Goals: 6 weeks   1) Pt will be (I) with initial HEP (progressing 7/6/2023)   2) Pt will be (I) with manual stretching program to increase functional use of bilateral hands (progressing 7/6/2023)   3) Pt will gain 4 lbs of  strength bilaterally for increased participation during functional tasks (progressing 7/6/2023)   4) Pt will be able to open a medication bottle with Mod I 5/5 attempts (progressing 7/6/2023)   5) Cervical range of motion to be assessed once precautions lifted and goal(s) to be added as necessary (progressing 7/6/2023)   6) Pt will complete 10 minutes on the ergometer, standing, level 3.0 for increased standing/activity tolerance (once precautions are lifted)  (progressing 7/6/2023)   7). Pt will wash and dry 10 cups/dishes with mod I.  (progressing 7/6/2023)      Long Term Goals: 12 weeks   1) Mod I - cutting food (progressing 7/6/2023)   2) Mod I - buttons and fasteners (progressing 7/6/2023)   3) Pt will be able to tie his shoes, Mod I, in a timely manner. (progressing 7/6/2023)   4) Pt will increase fine motor coordination, as evidenced by a decrease of 12 seconds bilaterally during 9 Hole Peg test (progressing 7/6/2023)   5) Pt will increase gross motor coordination, as evidenced by an increase of 10  blocks bilaterally during Box and Block. (progressing 7/6/2023)   6) Pt will self report increased ability to turn steering wheel on riding  to successfully cut the grass in one location at his residence (front or back yard). (progressing 7/6/2023)     NEW GOALS:  Will self report a decrease in cervical spine pain to 3/10.   Will increase cervical range of motion by 10 degrees (rotation, flexion, extension, lateral flexion)      Additional functional goals to be added as pt progresses and per his priorities.     PLAN     Certification Period/Plan of care expiration: 2/28/2023 to 5/26/2023. Extend to 8/31/2023     Outpatient Occupational Therapy 2 times weekly for 8 weeks to include the following interventions: Manual Therapy, Neuromuscular Re-ed, Orthotic Management and Training, Patient Education, Self Care, Therapeutic Activities, and Therapeutic Exercise.     Updates/Grading for next session: continue strengthening UE's; cervical range of motion     Fernanda Capellan OT

## 2023-07-06 NOTE — PROGRESS NOTES
[facility]  Physical Therapy Plan of Care Note     Name: Phong Fofana  Clinic Number: [id number]    Therapy Diagnosis:   Encounter Diagnoses   Name Primary?    Abnormality of gait due to impairment of balance Yes    S/P cervical spinal fusion      Physician: Tobi Levin DO    Visit Date: 7/6/2023    Physician Orders: PT Eval and Treat   Medical Diagnosis from Referral:   H/O cervical spine surgery  - Primary     Z98.890    Evaluation Date: 2/13/2023  Authorization Period Expiration: 2/9/2023 - 4/16/23  Updated Plan of Care Expiration: 5/26/2023  Visit # / Visits authorized: 22/28      PTA Visit #: 0/5      Time In: 1046 (late arrival)   Time Out: 1115  Total Billable Time: 29 minutes      Precautions: Standard, Fall, and Post-op cervical fusion  Surgery date: 1/17/23 (Post-op week 16)         Subjective     Pt reports: No complaints at start of session expect 4/10 neck pain. No verbalizations during session, uses gestures for expressive communication.     He indicated he was compliant with home exercise program  Response to previous treatment: no problems stated  Functional change: ongoing     Pain: 4/10  Location: neck      Objective      Garo participated in therapeutic activities to assess/improve functional performance for 29 minutes, including:     Patient participated in reassessment of standardized outcome measures as follows:     APTA Core Set Outcome Measures for Adults with Neurologic Conditions     2/16/23 4/13/2023 7/6/2023   Reference values    Timed Up and Go Not tested         score >14 seconds indicates risk for falls in older stroke patients (Kyleigh et al, 2006)   10 Meter Walk Test  Not tested      0-0.4 m/s = household walker  0.4-0.8 m/s = limited community ambulator   0.8-1.4 m/s = community ambultor  >1.4 m/s = can cross street safely    Functional Gait Assessment  13/30 20/30 24/30 <22/30 = identifies fall risk in community dwelling older adults  "  (MCID = 4)   Melton Balance Test Not tested         Fall risk cut-off for stroke= 45/56   6 Minute Walk Test   1466'09" 1531'08" 1458 feet 6 Minute Walk Test Distances: Means by Age and Gender     Age Gender Mean   60-69 Female  Male 572 meters (1876 feet)  538 meters (1765 feet)   70-79 Female  Male 527 meters (1729 feet)  471 meters (1545 feet)   80-89 Female  Male 417 meters (1368 feet)  392 meters ( 1286 feet)    ROBBIE Early (2000)       5 times sit-stand    Not tested         >12 sec= fall risk for general elderly  >10 sec= balance/vestibular dysfunction (<61 y/o)  >14.2 sec= balance/vestibular dysfunction (>61 y/o)  >12 sec= fall risk for stroke      Functional Gait Assessment:   1. Gait on level surface =  3  (3) Normal: less than 5.5 sec, no A.D., no imbalance, normal gait pattern, deviates< 6in  (2) Mild impairment: 7-5.6 sec, uses A.D., mild gait deviations, or deviates 6-10 in  (1) Moderate impairment: > 7 sec, slow speed, imbalance, deviates 10-15 in.  (0) Severe impairment: needs assist, deviates >15 in, reach/touch wall  2. Change in Gait Speed = 3  (3) Normal: smooth change w/o loss of balance or gait deviation, deviates < 6 in, significant difference between speeds  (2) Mild impairment: changes speed, but demonstrates mild gait deviations, deviates 6-10 in, OR no deviations but unable to significantly speed, OR uses A.D.  (1) Moderate impairment: minor changes to speed, OR changes speed w/ significant deviations, deviates 10-15 in, OR  Changes speed , but loses balance & recovers  (0) Severe impairment: cannot change speed, deviates >15 in, or loses balance & needs assist  3. Gait with horizontal head turns  = 3  (3) Normal: no change in gait, deviates <6 in  (2) Mild impairment: slight change in speed, deviates 6-10 in, OR uses A.D.  (1) Moderate impairment: moderate change in speed, deviates 10-15 in  (0) Severe impairment: severe disruption of gait, deviates >15in  4. Gait with vertical head " turns = 2  (3) Normal: no change in gait, deviates <6 in  (2) Mild impairment: slight change in speed, deviates 6-10 in OR uses A.D.  (1) Moderate impairment: moderate change in speed, deviates 10-15 in  (0) Severe impairment: severe disruption of gait, deviates >15 in  5. Gait with pivot turns = 3  (3) Normal: performs safely in 3 sec, no LOB  (2) Mild impairment: performs in >3 sec & no LOB, OR turns safely & requires several steps to regain LOB  (1) Moderate impairment: turns slow, OR requires several small steps for balance following turn & stop  (0) Severe impairment: cannot turn safely, needs assist  6. Step over obstacle = 1  (3) Normal: steps over 2 stacked boxes w/o change in speed or LOB  (2) Mild impairment: able to step over 1 box w/o change in speed or LOB  (1) Moderate impairment: steps over 1 box but must slow down, may require VC  (0) Severe impairment: cannot perform w/o assist  7. Gait with Narrow ARIAS = 2  (3) Normal: 10 steps no staggering  (2) Mild impairment: 7-9 steps  (1) Moderate impairment: 4-7 steps  (0) Severe impairment: < 4 steps or cannot perform w/o assist  8. Gait with eyes closed = 2  (3) Normal: < 7 sec, no A.D., no LOB, normal gait pattern, deviates <6 in  (2) Mild impairment: 7.1-9 sec, mild gait deviations, deviates 6-10 in  (1) Moderate impairment: > 9 sec, abnormal pattern, LOB, deviates 10-15 in  (0) Severe impairment: cannot perform w/o assist, LOB, deviates >15in  9. Ambulating Backwards = 3  (3) Normal: no A.D., no LOB, normal gait pattern, deviates <6in  (2) Mild impairment: uses A.D., slower speed, mild gait deviations, deviates 6-10 in  (1) Moderate impairment: slow speed, abnormal gait pattern, LOB, deviates 10-15 in  (0) Severe impairment: severe gait deviations or LOB, deviates >15in  10. Steps = 2  (3) Normal: alternating feet, no rail  (2) Mild Impairment: alternating feet, uses rail  (1) Moderate impairment: step-to, uses rail  (0) Severe impairment: cannot perform  safely    Score 24/30     Score:   <22/30 fall risk   <20/30 fall risk in older adults   <18/30 fall risk in Parkinsons          FOTO Nontraumatic CNS dysfunction Survey:         Therapist reviewed FOTO scores for Phong Fofana on 7/6/2023.   FOTO documents entered into [employer] - see Media section.     Functional Status Score: 65                 Assessment     Garo provided good participation and effort during session today with focus on reassessment of progress towards goals to determine readiness for discharge. He has met all short term goals and 3/8 long term goals (2 partially met). This includes an increase in his dynamic gait as evidenced by a 9 point increase in his Functional Gait Assessment since initial evaluation. Despite improvements in his walking and balance, patient continues to report neck pain that increases with activities. Due to plateau in progress towards remaining goals, recommending discharge from physical therapy this date. He will continue with occupational therapy which will continue to address his neck pain as well as upper extremity impairments. Patient voiced agreement with this plan.       GOALS    Short Term Goals: 4 weeks   Garo will report less difficulty with moderate activities like moving a table or pushing a vacuum on her FOTO to show improvements with his function. MET (7/6/2023)  Garo will perform 6 Minute walk test to assess ambulatory endurance and establish appropriate goals. MET  Garo will perform Functional Gait Assessment to assess dynamic balance and fall risk with walking in the home and community. MET  Garo will report being able to tolerate 15 minutes of exercise and cardiovascular activity without increases in his neck pain. MET     Long Term Goals: 8 weeks   Garo will improve is FOTO limitation score to 28% to show improvements in his overall functional mobility. NOT MET (7/6/2023)  Garo will report and demonstrate understanding of proper body mechanics  with squatting and lifting activities. PARTIALLY MET  Garo will tolerate active cervical range of motion to 25-50% of total range of motion in all planes. MET  Garo will be able to maintain a chin tuck for 10 seconds without increases in pain. NOT MET  Garo will report being able to tolerate 30 minutes of exercise and cardiovascular activity without increases in his neck pain. MET  Garo will improve his distance on the 6MWT to 1616 to show improvements in his ambulatory endurance. (MDC for spinal cord injury = 45.8 m or ~150 feet). PARTIALLY MET (7/6/2023)  Garo will improve his score on the FGA to 18/30 to show improvements in his dynamic balance with gait activities and his fall risk. MET (7/6/2023)  Garo will tolerate dynamic sitting and standing activities for 45-60 minutes without reported increase in pain. NOT MET    Plan   Patient to be discharged from outpatient physical therapy this date.    BARBI QUINTANA, PT

## 2023-07-11 ENCOUNTER — TELEPHONE (OUTPATIENT)
Dept: REHABILITATION | Facility: HOSPITAL | Age: 33
End: 2023-07-11

## 2023-08-31 ENCOUNTER — DOCUMENTATION ONLY (OUTPATIENT)
Dept: REHABILITATION | Facility: HOSPITAL | Age: 33
End: 2023-08-31
Payer: MEDICAID

## 2023-08-31 DIAGNOSIS — M24.549 CONTRACTURE OF HAND: ICD-10-CM

## 2023-08-31 DIAGNOSIS — R27.8 DECREASED COORDINATION: ICD-10-CM

## 2023-08-31 DIAGNOSIS — R53.1 DECREASED STRENGTH: ICD-10-CM

## 2023-08-31 DIAGNOSIS — M25.60 DECREASED RANGE OF MOTION: Primary | ICD-10-CM

## 2023-08-31 NOTE — PROGRESS NOTES
[facility]  Occupational Therapy Discharge Note    Name: Phong Fofana  Clinic Number: [id number]    Therapy Diagnosis:   Encounter Diagnoses   Name Primary?    Decreased range of motion Yes    Decreased coordination     Decreased strength     Contracture of hand      Physician: Tobi Levin DO     Physician Orders: OT eval and treat   Medical Diagnosis:   Z98.890 (ICD-10-CM) - History of cervical spinal surgery  Evaluation Date: 2/28/2023  Plan of Care Updated: 7/6/2023     Date of Last visit: 7/6/2023  Total Visits Received: 18    ASSESSMENT    Please see note from 7/6/2023 for most recent measurements. Despite calls to patient/ family, patient either cancelled or no-showed for his remaining visits.     Discharge reason: Patient has not attended therapy since 7/6/2023    Discharge FOTO Score:       Goals:   Short Term Goals: 6 weeks   1) Pt will be (I) with initial HEP (not met)   2) Pt will be (I) with manual stretching program to increase functional use of bilateral hands (not met)   3) Pt will gain 4 lbs of  strength bilaterally for increased participation during functional tasks (not met)   4) Pt will be able to open a medication bottle with Mod I 5/5 attempts (not met)   5) Cervical range of motion to be assessed once precautions lifted and goal(s) to be added as necessary (not met)   6) Pt will complete 10 minutes on the ergometer, standing, level 3.0 for increased standing/activity tolerance (once precautions are lifted)  (not met)   7). Pt will wash and dry 10 cups/dishes with mod I.  (not met)      Long Term Goals: 12 weeks   1) Mod I - cutting food (not met)   2) Mod I - buttons and fasteners (not met)   3) Pt will be able to tie his shoes, Mod I, in a timely manner. (not met)   4) Pt will increase fine motor coordination, as evidenced by a decrease of 12 seconds bilaterally during 9 Hole Peg test (not met)   5) Pt will increase gross motor coordination, as  evidenced by an increase of 10 blocks bilaterally during Box and Block. (not met)   6) Pt will self report increased ability to turn steering wheel on riding  to successfully cut the grass in one location at his residence (front or back yard). (not met)      NEW GOALS:  Will self report a decrease in cervical spine pain to 3/10. (not met)   Will increase cervical range of motion by 10 degrees (rotation, flexion, extension, lateral flexion) (not met)     PLAN   This patient is discharged from Occupational Therapy    Shagufta Rucker, OT

## 2024-01-02 DIAGNOSIS — M24.542 CONTRACTURE, LEFT HAND: Primary | ICD-10-CM

## 2024-01-04 ENCOUNTER — DOCUMENTATION ONLY (OUTPATIENT)
Dept: REHABILITATION | Facility: HOSPITAL | Age: 34
End: 2024-01-04

## 2024-01-04 NOTE — PROGRESS NOTES
Called referring provider's office to see if any clinic notes available post 12-1-23 and was told no since patient only came after 12-1-23 to sign pre op paperwork

## 2024-01-04 NOTE — PROGRESS NOTES
Faxed information request (see epic) sheet to referring provider at [phone] (previous attempt abandoned however wrong # used)

## 2024-01-12 ENCOUNTER — DOCUMENTATION ONLY (OUTPATIENT)
Dept: REHABILITATION | Facility: HOSPITAL | Age: 34
End: 2024-01-12

## 2024-01-12 ENCOUNTER — CLINICAL SUPPORT (OUTPATIENT)
Dept: REHABILITATION | Facility: HOSPITAL | Age: 34
End: 2024-01-12
Payer: MEDICARE

## 2024-01-12 DIAGNOSIS — M25.632 STIFFNESS OF LEFT WRIST JOINT: ICD-10-CM

## 2024-01-12 DIAGNOSIS — M25.642 STIFFNESS OF LEFT HAND JOINT: ICD-10-CM

## 2024-01-12 DIAGNOSIS — M24.542 CONTRACTURE, LEFT HAND: Primary | ICD-10-CM

## 2024-01-12 PROCEDURE — L3808 WHFO, RIGID W/O JOINTS: HCPCS | Mod: PN

## 2024-01-12 NOTE — PROGRESS NOTES
Occupational Therapy Custom OrthoticNote     Date: 1/12/2024  Name: Phong Fofana  Clinic Number: [id number]    Therapy Diagnosis:   Encounter Diagnoses   Name Primary?    Contracture, left hand Yes    Stiffness of left wrist joint     Stiffness of left hand joint      Physician: Order, Paper    Physician Orders: evaluate and tx, see epic  Medical Diagnosis: left hand contracture  Surgical Procedure and Date: left hand fds to fdp transfer, carpal tunnel release-see epic        Insurance Authorization Period Expiration: referral [id number] 1-12-24 thru 1-31-24 1/1   L code 3808        Time In:750  Time Out: 845  Total Billable Time: 0 minutes since billing L code    Precautions:  universal, see epic, protocol if applicable    Subjective     Pt and mother report: good understanding of orthotic purpose, use, and precautions    Objective       Post op dressing removed    Xeroform, white gauze, and coflex applied    Fabricated custom thermoplast orthotic (forearm based dorsal block holding wrist in mild pf, mcp joints at about 70, and ip joints in available painless ext)    Home Exercises and Education Provided       Written Home Exercises/Information Provided: yes.          See EMR under patient instructions and/or media for HEP issued today or in past    Assessment     Orthotic with good fit    Sutures in place    No outward signs of infection          Pt's spiritual, cultural and educational needs considered and pt agreeable to plan of care and goals.                  Plan:  initial evaluation    Santiago REYNA CHT      I certify the need for the services furnished under this plan of care.    doctor's signature/referring provider's signature:______________________________________________________

## 2024-01-12 NOTE — PROGRESS NOTES
Phone calls since faxed request for op note sent indicate op note will not be available until early next week    Yesteday I called referring provider's office and requested referring provider to call our  to confirm if planned surgery was surgery performed

## 2024-01-12 NOTE — PATIENT INSTRUCTIONS
1-12-24  -remove splint only to change dressing and clean skin; leave wrist and hand in same position out of splint as in splint  -use hand for extremely light use only when in splint, use hand for no activity when out of splint  -do only what you learn in therapy with hand  -change dressing once a day (nonadherent gauze, white gauze, brown gauze-buy at pharmacies, if you can't find nonadherent gauze use a nonstick pad with brown gauze)  -change dressing immediately if it gets wet  -wrap splint in plastic wrap when in tub  -do below exercises in splint every hour; hold for at least 2 minutes; do on index thru small one at a time    *all 3 joints get bent towards palm    *knuckle is touching splint, middle and tip joints get emperatriz

## 2024-01-19 ENCOUNTER — CLINICAL SUPPORT (OUTPATIENT)
Dept: REHABILITATION | Facility: HOSPITAL | Age: 34
End: 2024-01-19
Payer: MEDICARE

## 2024-01-19 ENCOUNTER — DOCUMENTATION ONLY (OUTPATIENT)
Dept: REHABILITATION | Facility: HOSPITAL | Age: 34
End: 2024-01-19

## 2024-01-19 DIAGNOSIS — M25.642 STIFFNESS OF LEFT HAND JOINT: ICD-10-CM

## 2024-01-19 DIAGNOSIS — M25.632 STIFFNESS OF LEFT WRIST JOINT: Primary | ICD-10-CM

## 2024-01-19 PROCEDURE — 97165 OT EVAL LOW COMPLEX 30 MIN: CPT | Mod: PN

## 2024-01-19 NOTE — PATIENT INSTRUCTIONS
current home program 1-19-24  -two times a day put a small pinch of baby powder/talcum powder in the web spaces/spots between the knuckles of index and long, long and ring, as well as ring and small; this is to avoid white and moist skin

## 2024-01-19 NOTE — PLAN OF CARE
[facility] Occupational Therapy  Initial Evaluation     Date: 1/19/2024  Name: Phong Fofana  Clinic Number: [id number]    Therapy Diagnosis:   Encounter Diagnoses   Name Primary?    Stiffness of left wrist joint Yes    Stiffness of left hand joint      Physician: Order, Paper    Physician Orders: evaluate and tx, see epic  Medical Diagnosis: left hand contracture  Surgical Procedure and Date: see media, 1-9-24  Evaluation Date: 1/19/2024  Insurance Authorization Period Expiration: referral [id number] 1-2-24 thru 12-31-24             Plan of Care Certification Period: 1-18-24 thru 4-11-24                            Date of Return to referral source's office: will obtain    Visit # / Visits authorized: 1 / 1 referral [id number] 1-2-24 thru 12-31-24  Time In:7  Time Out: 745  Total Billable Time: 0 minutes    Precautions:  universal, see epic, protocol if applicable  Subjective     Involved Side: left  Dominant Side: not tested  Date of Onset: roughly 1 year ago  History of Current Condition/Mechanism of Injury: patient is nonverbal, communicated via phone and gestures, seems to think hx in referring provider's clinic note in media is consistent with what happened (seizure, cva, neck surgery)  Imaging: see epic  Previous Therapy: custom orthotic after above surgery    Past Medical History/Physical Systems Review:   Phong Fofana  has a past medical history of Abnormal gait, Anxious depression, Back pain, Bipolar disorder, unspecified, Contracture of hand, Depression, Migraines, Neck pain, Nonverbal, Photosensitivity, Schizophrenia, unspecified, and Seizures.    Phong Fofana  has a past surgical history that includes Tympanostomy tube placement; Wrist surgery (Left); Spine surgery; Fusion of cervical spine by posterior approach (N/A, 1/17/2023); and Cervical laminectomy with spinal fusion (N/A, 1/17/2023).    Phong has a current medication list which includes the following  prescription(s): baclofen, cefadroxil, gabapentin, hydrocodone-acetaminophen, levetiracetam, endocet, oxycodone-acetaminophen, and quetiapine.    Review of patient's allergies indicates:  No Known Allergies     Patient's Goals for Therapy: full painless use    Pain:  Functional Pain Scale Rating 0-10:   2/10 on average  0/10 at best  5/10 at worst  Side: left  Location: diffuse thru all digits  Description: ache  Aggravating Factors: nothing in particular  Easing Factors: rest  Occupation:  not working  Working presently: no  Duties: per job if working; typical self and home care    Functional Limitations/Social History:    Previous functional status includes: Independent with all ADLs.     Current Functional Status   Home/Living environment : lives with mother    Limitation of Functional Status as follows: decreased motion, use, and strength with ADL   ADLs/IADLs:               See above   Leisure: not tested  pain meds: denies  nicotine: secondhand smoke  caffeine: denies    Objective   Posture:sutures in place with no outward signs of infection, dressing in place today (nonstick dressing, ace wrap), in custom orthotic, thumb mcp and ip in significant resting flexion, index thru small mcp resting at 0 and pip joints in max flexion and dip joints at 0  Palpation:not tested  Sensation:denies burning, numbness, tingling  ROM:prom of thumb full opp, mpc and ip ext with mcp at 70 abd ip at 45; index thru small in splint index composite flex to dpc and long thru small 1 cm to dpc, intrinsic stretch index to A1 and long thru small 1 cm to A1 (index thru small dip joints with no flex)          Edema: none noted  DME:see previous note          CMS Impairment/Limitation/Restriction for FOTO Survey    Therapist reviewed FOTO scores for Phong SALAMANCA Fofana on 1/19/2024.   FOTO documents entered into EPIC - see Media section.                 Treatment     Briefly reviewed HEP    Trimmed both sides of distal orthotic to allow  for more leverage to extend pip joints              Home Exercise Program/Education:  Issued home program (see patient instructions in EMR in media or note) .  Pt received a written copy . Garo demonstrated good understanding of the education provided.    Patient/Family Education: role of OT, goals for OT, scheduling/cancellations - pt verbalized understanding. Discussed insurance limitations with patient.    Additional Education provided: likely tx progression, expectations of rehab    Assessment     Phong Fofana is a 33 y.o. male referred to outpatient occupational therapy and presents with a medical diagnosis of see above resulting in Decreased ROM, Decreased muscle strength, Decreased functional hand use, Increased pain, Joint Stiffness, and Scar Adhesions and demonstrates limitations as described in the chart below. Following medical record review it is determined that pt will benefit from occupational therapy services in order to maximize pain free and/or functional use of left hand. The following goals were discussed with the patient and patient is in agreement with them as to be addressed in the treatment plan. The patient's rehab potential is good.     Anticipated barriers to occupational therapy: scar, stiffness, chronicity of condition, weakness  Pt has no cultural, educational or language barriers to learning provided.    Profile and History Assessment of Occupational Performance Level of Clinical Decision Making Complexity Score   Occupational Profile:   Phong Fofana is a 33 y.o. male who lives with their family and is  not working  Phong Fofana has difficulty with  ADLs and IADLs as listed previously, which  affecting his/her daily functional abilities.      Comorbidities:    has a past medical history of Abnormal gait, Anxious depression, Back pain, Bipolar disorder, unspecified, Contracture of hand, Depression, Migraines, Neck pain, Nonverbal, Photosensitivity, Schizophrenia,  unspecified, and Seizures.    Medical and Therapy History Review:   Brief               Performance Deficits    Physical:  Joint Mobility  Muscle Power/Strength  Skin Integrity/Scar Formation  Muscle Tone    Cognitive:  No Deficits    Psychosocial:    No Deficits     Clinical Decision Making:  low    Assessment Process:  Problem-Focused Assessments    Modification/Need for Assistance:  Not Necessary    Intervention Selection:  Limited Treatment Options       low  Based on PMHX, co morbidities , data from assessments and functional level of assistance required with task and clinical presentation directly impacting function.           The following goals were discussed with the patient and patient is in agreement with them as to be addressed in the treatment plan.     Goals:   Short term goals to be met in 4 weeks 1. Patient will be I with HEP 2. Patient will have 2/10 pain with light use 3. Patient will have about 75% prom of left wrist and digits vs right to enhance affected arm use with ADL      Long term goals to be met by d/c 1. Patient will be I with d/c HEP 2. Patient will have 1/10 pain with light use 3. Patient will have WFL /pinch on affected side vs unaffected side to promote full functional use                                                                 4. Patient will be I with all light ADL      all goals ongoing unless noted above or met today or in past but not noted yet    Plan   Certification Period/Plan of care expiration: 1/19/2024 to             4-11-24    Outpatient Occupational Therapy  2 times weekly for 12 weeks to include the following interventions: eval and tx    Above frequency and duration in above dates may change based on patient progress and need for therapy    Santiago REYNA CHT      I certify the need for the services furnished under this plan of care.    doctor's signature/referring provider's signature:______________________________________________________

## 2024-01-23 ENCOUNTER — CLINICAL SUPPORT (OUTPATIENT)
Dept: REHABILITATION | Facility: HOSPITAL | Age: 34
End: 2024-01-23
Payer: MEDICARE

## 2024-01-23 DIAGNOSIS — M25.632 STIFFNESS OF LEFT WRIST JOINT: Primary | ICD-10-CM

## 2024-01-23 DIAGNOSIS — M25.642 STIFFNESS OF LEFT HAND JOINT: ICD-10-CM

## 2024-01-23 PROCEDURE — 97530 THERAPEUTIC ACTIVITIES: CPT | Mod: PN

## 2024-01-23 NOTE — PROGRESS NOTES
Occupational Therapy Daily Treatment Note     Date: 1/23/2024  Name: Phong Fofana  Clinic Number: [id number]    Therapy Diagnosis:   Encounter Diagnoses   Name Primary?    Stiffness of left wrist joint Yes    Stiffness of left hand joint      Physician: Order, Paper      Physician Orders: evaluate and tx, see epic  Medical Diagnosis: left hand contracture  Surgical Procedure and Date: see [place], 1-9-24  Evaluation Date: 1/19/2024  Insurance Authorization Period Expiration: see epic        Plan of Care Certification Period: 1-18-24 thru 4-11-24                            Date of Return to referral source's office: will obtain     Visit # / Visits authorized: see epic    Time In:7  Time Out: 8  Total Billable Time: 60 minutes    Precautions:  universal, see epic, protocol if applicable    Subjective     Pt reports: tried an alternative product instead of powder I recommended, will try powder I recommended  he is compliant with home exercise program.  Response to previous treatment:good  Functional change: none stated    Pain: 0/10 rest; 0/10 light use in splint  Side:left  Location: wrist and       hand  Objective       Timed units:  4 therapeutic exercise                            time:7-8        Measurements/special tests/observations: n/a        Fluidotherapy: n/a    Ultrasound:n/a      Upper body ergometer: n/a    Scar massage: n/a    Stretches as tolerated-pain free:     Thumb mcp and ip ext stretches done simultaneously in splint  Index thru small composite flex, intrinsic in splint  Index thru small pip ext stretches in splint with mcp in full available ext      Manual: n/a    Range of motion: n/a    Progressive resistive exercise: n/a      Home exercise program: see above and/or below    Removed ace wrap and nonstick dressing  Applied xeroform, white gauze, and coflex    Home Exercises and Education Provided     Education provided:       Purpose of scar massager I asked him to order          progress  towards goals   likely treatment progression  rationale of rehab interventions    Written Home Exercises/Information Provided: yes.        previously issued exercises and/or other issued home therapy instructions were reviewed if still part of current treatment plan as well as any issued today and Garo was able to demonstrate them prior to the end of the session.  Garo demonstrated  understanding of the HEP provided.     See EMR under patient instructions and/or media for HEP issued today or in past    Assessment     May eventually benefit from dip joint flexion stretches with mcp and pip joints held in maximum available ext    Dip joints with severe tightness into flexion however patient says pre surgery hand had a straight fist posture with thumb mcp and ip locked in flex and 1st metacarpal in palmar adduction    Pt would continue to benefit from skilled occupational therapy in order to facilitate as much functional use as possible considering hand status for a length amount of time pre surgery    Garo is progressing well towards his goals and there are no updates to goals at this time unless goals noted below are different than goals on previous notes or evaluation. Pt prognosis is good  Pt will continue to benefit from skilled outpatient occupational therapy to address the deficits listed in the problem list on initial evaluation to provide pt/family education and to maximize pt's level of independence in the home and community environment.     Anticipated barriers to occupational therapy: stiffness, scar, weakness, chronicity of condition    Pt's spiritual, cultural and educational needs considered and pt agreeable to plan of care and goals.    Goals:  Short term goals to be met in 4 weeks 1. Patient will be I with HEP 2. Patient will have 2/10 pain with light use 3. Patient will have about 75% prom of left wrist and digits vs right to enhance affected arm use with ADL        Long term goals to be met by d/c  1. Patient will be I with d/c HEP 2. Patient will have 1/10 pain with light use 3. Patient will have WFL /pinch on affected side vs unaffected side to promote full functional use                                                                 4. Patient will be I with all light ADL       all goals ongoing unless noted above or met today or in past but not noted yet            Any goals met today ?  (if any met see above)    Updates/Grading for next session: as needed    Plan: evaluate and treat    Santiago REYNA CHT

## 2024-01-25 ENCOUNTER — DOCUMENTATION ONLY (OUTPATIENT)
Dept: REHABILITATION | Facility: HOSPITAL | Age: 34
End: 2024-01-25
Payer: MEDICARE

## 2024-01-25 NOTE — PROGRESS NOTES
Faxed therapy progress update to referring provider at [phone] for today's return to doctor visit

## 2024-01-30 ENCOUNTER — CLINICAL SUPPORT (OUTPATIENT)
Dept: REHABILITATION | Facility: HOSPITAL | Age: 34
End: 2024-01-30
Payer: MEDICARE

## 2024-01-30 DIAGNOSIS — M25.642 STIFFNESS OF LEFT HAND JOINT: ICD-10-CM

## 2024-01-30 DIAGNOSIS — M25.632 STIFFNESS OF LEFT WRIST JOINT: Primary | ICD-10-CM

## 2024-01-30 PROCEDURE — 97530 THERAPEUTIC ACTIVITIES: CPT | Mod: PN

## 2024-01-30 NOTE — PATIENT INSTRUCTIONS
home program 1-30-24   -gently massage scar with lotion at least 5 minutes 4 x day (massage only the sections that do not have scab)  -bring in scar massager once received   -continue current splint schedule and exercise routine  -do below exercise one time, at least 2 x day, at least a 10 mintue hold, mild discomfort only  *use right hand to push left thumb joints straight

## 2024-01-30 NOTE — PROGRESS NOTES
Occupational Therapy Daily Treatment Note     Date: 1/30/2024  Name: Phong Fofana  Clinic Number: [id number]    Therapy Diagnosis:   Encounter Diagnoses   Name Primary?    Stiffness of left wrist joint Yes    Stiffness of left hand joint      Physician: Order, Paper      Physician Orders: evaluate and tx, see epic  Medical Diagnosis: left hand contracture  Surgical Procedure and Date: see [place], 1-9-24  Evaluation Date: 1/19/2024  Insurance Authorization Period Expiration: see epic        Plan of Care Certification Period: 1-18-24 thru 4-11-24                            Date of Return to referral source's office: will obtain     Visit # / Visits authorized: see epic    Time In:715  Time Out: 8  Total Billable Time: 60 minutes    Precautions:  universal, see epic, protocol if applicable    Subjective     Pt reports: saw ortho, sutures removed; ordered scar massager as advised; understands to continue current splint schedule (did not bring to therapy today)  he is compliant with home exercise program.  Response to previous treatment:good  Functional change: none stated    Pain: 0/10 rest; 0/10 light use in splint  Side:left  Location: wrist and       hand  Objective       Timed units:  3 therapeutic exercise                            time:715-8        Measurements/special tests/observations: n/a        Fluidotherapy: n/a    Ultrasound:n/a      Upper body ergometer: n/a    Scar massage: n/a    Stretches as tolerated-pain free: done with wrist and mcp joints in same position as in splint    Thumb mcp and ip ext stretches done simultaneously   Index thru small composite flex, intrinsic   Index thru small pip ext stretches with mcp in full available ext      Manual: n/a    Range of motion: n/a    Progressive resistive exercise: n/a      Home exercise program: see above and/or below    Removed ace wrap and nonstick dressing  Applied xeroform, white gauze, and coflex    Home Exercises and Education Provided      Education provided:       Wash hand and forearm with soap and water          progress towards goals   likely treatment progression  rationale of rehab interventions    Written Home Exercises/Information Provided: yes.        previously issued exercises and/or other issued home therapy instructions were reviewed if still part of current treatment plan as well as any issued today and Garo was able to demonstrate them prior to the end of the session.  Garo demonstrated  understanding of the HEP provided.     See EMR under patient instructions and/or media for HEP issued today or in past    Assessment     Distal third of longitudinal incision with some mild scabbing      Pt would continue to benefit from skilled occupational therapy in order to facilitate as much functional use as possible considering hand status for a length amount of time pre surgery    Garo is progressing well towards his goals and there are no updates to goals at this time unless goals noted below are different than goals on previous notes or evaluation. Pt prognosis is good  Pt will continue to benefit from skilled outpatient occupational therapy to address the deficits listed in the problem list on initial evaluation to provide pt/family education and to maximize pt's level of independence in the home and community environment.     Anticipated barriers to occupational therapy: stiffness, scar, weakness, chronicity of condition    Pt's spiritual, cultural and educational needs considered and pt agreeable to plan of care and goals.    Goals:  Short term goals to be met in 4 weeks 1. Patient will be I with HEP 2. Patient will have 2/10 pain with light use 3. Patient will have about 75% prom of left wrist and digits vs right to enhance affected arm use with ADL        Long term goals to be met by d/c 1. Patient will be I with d/c HEP 2. Patient will have 1/10 pain with light use 3. Patient will have WFL /pinch on affected side vs unaffected  side to promote full functional use                                                                 4. Patient will be I with all light ADL       all goals ongoing unless noted above or met today or in past but not noted yet            Any goals met today ?  (if any met see above)    Updates/Grading for next session: as needed    Plan: evaluate and treat    Santiago REYNA CHT

## 2024-02-08 ENCOUNTER — CLINICAL SUPPORT (OUTPATIENT)
Dept: REHABILITATION | Facility: HOSPITAL | Age: 34
End: 2024-02-08
Payer: MEDICARE

## 2024-02-08 DIAGNOSIS — M25.642 STIFFNESS OF LEFT HAND JOINT: ICD-10-CM

## 2024-02-08 DIAGNOSIS — M25.632 STIFFNESS OF LEFT WRIST JOINT: Primary | ICD-10-CM

## 2024-02-08 PROCEDURE — 97530 THERAPEUTIC ACTIVITIES: CPT | Mod: PN

## 2024-02-08 NOTE — PROGRESS NOTES
Occupational Therapy Daily Treatment Note     Date: 2/8/2024  Name: Phong Fofana  Clinic Number: [id number]    Therapy Diagnosis:   Encounter Diagnoses   Name Primary?    Stiffness of left wrist joint Yes    Stiffness of left hand joint      Physician: Order, Paper      Physician Orders: evaluate and tx, see epic  Medical Diagnosis: left hand contracture  Surgical Procedure and Date: see [place], 1-9-24  Evaluation Date: 1/19/2024  Insurance Authorization Period Expiration: see epic        Plan of Care Certification Period: 1-18-24 thru 4-11-24                            Date of Return to referral source's office: will obtain     Visit # / Visits authorized: see epic    Time In:7  Time Out: 8  Total Billable Time: 30 minutes    Precautions:  universal, see epic, protocol if applicable    Subjective     Pt reports: he is compliant with home exercise program. Scar massager delivered to wrong address, supposed to receive within 2 days.  Response to previous treatment:good  Functional change: none stated    Pain: 0/10 rest; 0/10 light use in splint  Side:left  Location: wrist and       hand  Objective       Timed units:    1 therapeutic exercise                            time:7-715    Untimed units  Fluidotherapy                                        time:7-715    Measurements/special tests/observations:     Incision healed end to end        Fluidotherapy: 15 min.    Ultrasound:n/a      Upper body ergometer: n/a    Scar massage: n/a    Stretches as tolerated-pain free: n/a    Manual: n/a    Range of motion: arom per today's HEP    Progressive resistive exercise: n/a      Home exercise program: see above and/or below        Home Exercises and Education Provided     Education provided:         Follow home program to the letter to avoid transfer rupture        progress towards goals   likely treatment progression  rationale of rehab interventions    Written Home Exercises/Information Provided: yes.        previously  issued exercises and/or other issued home therapy instructions were reviewed if still part of current treatment plan as well as any issued today and Garo was able to demonstrate them prior to the end of the session.  Garo demonstrated  understanding of the HEP provided.     See EMR under patient instructions and/or media for HEP issued today or in past    Assessment     May benefit from wrist df stretches as well as pip joint ext stretches with wrist and mcp joints at neutral    Pt would continue to benefit from skilled occupational therapy in order to facilitate as much functional use as possible considering hand status for a length amount of time pre surgery    Garo is progressing well towards his goals and there are no updates to goals at this time unless goals noted below are different than goals on previous notes or evaluation. Pt prognosis is good  Pt will continue to benefit from skilled outpatient occupational therapy to address the deficits listed in the problem list on initial evaluation to provide pt/family education and to maximize pt's level of independence in the home and community environment.     Anticipated barriers to occupational therapy: stiffness, scar, weakness, chronicity of condition    Pt's spiritual, cultural and educational needs considered and pt agreeable to plan of care and goals.    Goals:  Short term goals to be met in 4 weeks 1. Patient will be I with HEP 2. Patient will have 2/10 pain with light use 3. Patient will have about 75% prom of left wrist and digits vs right to enhance affected arm use with ADL        Long term goals to be met by d/c 1. Patient will be I with d/c HEP 2. Patient will have 1/10 pain with light use 3. Patient will have WFL /pinch on affected side vs unaffected side to promote full functional use                                                                 4. Patient will be I with all light ADL       all goals ongoing unless noted above or met today  or in past but not noted yet            Any goals met today ?  (if any met see above)    Updates/Grading for next session: as needed    Plan: evaluate and treat    Santiago REYNA CHT

## 2024-02-27 ENCOUNTER — DOCUMENTATION ONLY (OUTPATIENT)
Dept: REHABILITATION | Facility: HOSPITAL | Age: 34
End: 2024-02-27
Payer: MEDICARE

## 2024-02-27 NOTE — PROGRESS NOTES
Outpatient Therapy Discharge Summary   Discharge Date: 2/27/2024   Name: Phong Fofana  Clinic Number: [id number]  Therapy Diagnosis:        Encounter Diagnosis   Name Primary?    Dysarthria Yes    Physician: Erick Walsh MD  Physician Orders: Ambulatory Referral to Speech Therapy   Medical Diagnosis: dysarthria  Evaluation Date: 5/19/2023    Date of Last visit: 5/19/2023  Total Visits Received: 1    Assessment    Assessment of Current Status: Patient did not follow up after [facility] evaluation and therefore no progress on goals below were noted. He received his device via insurance and was likely independently with use. Discharge from Speech Therapy appropriate at this time as patient has not been seen since 5/19/2023.      Short Term Goals (8 weeks):   1. Given a specific message to find, patient will independently navigate to the correct page on 8/10 attempts.   2. Patient will demonstrate comprehension of basic maintenance and operations (on-off, adjusting volume) of the SGD on 5/5 attempts.   3. Patient will communicate personal and medical needs, feelings, and opinions and make requests to familiar and unfamiliar communication partners independently with on 9/10 attempts.     Long Term Goals (12 weeks):   1. Christopher L Fofana will use the SGD to efficiently interact with familiar and unfamiliar communication partners to improve functional communication.   2. Christopher L Fofana  will use the SGD to express medical emergency situations or health related information independently to communication partners to improve functional communication.    Discharge reason: Patient has not attended therapy since 5/19/2023    Plan   This patient is discharged from Speech Therapy    Gina Garrido CCC-SLP   2/27/2024

## 2024-02-27 NOTE — PROGRESS NOTES
Outpatient Therapy Discharge Summary     Date: 2-27-24      Name: Phong Fofana  [facility] Number: [id number]    Therapy Diagnosis: No diagnosis found.  Physician: No ref. provider found    Physician Orders: see evaluation  Medical Diagnosis: see evaluation  Evaluation Date: 1-19-24      Date of Last visit: 2-8-24  Total Visits Received: 5  Cancelled Visits: see epic  No Show Visits: see epic      Please note roughly 1 week ago or so I contacted referring provider's office for return to doctor date and they said it was the day I called but patient had not arrived yet (time of visit was scheduled 30 min. prior to my call)  Assessment    Goals: see last note    Discharge reason: Patient has not attended therapy since 2-8-24    Plan   This patient is discharged from Occupational Therapy

## 2024-06-26 ENCOUNTER — HOSPITAL ENCOUNTER (INPATIENT)
Facility: HOSPITAL | Age: 34
LOS: 1 days | Discharge: HOME | DRG: 728 | End: 2024-06-28
Attending: EMERGENCY MEDICINE | Admitting: INTERNAL MEDICINE
Payer: MEDICARE

## 2024-06-26 DIAGNOSIS — D72.829 LEUKOCYTOSIS, UNSPECIFIED TYPE: ICD-10-CM

## 2024-06-26 DIAGNOSIS — E86.0 DEHYDRATION: ICD-10-CM

## 2024-06-26 DIAGNOSIS — N17.9 AKI (ACUTE KIDNEY INJURY): Primary | ICD-10-CM

## 2024-06-26 DIAGNOSIS — N50.819 TESTICULAR PAIN: ICD-10-CM

## 2024-06-26 LAB
ALBUMIN SERPL BCP-MCNC: 5 G/DL (ref 3.5–5.2)
ALP SERPL-CCNC: 124 U/L (ref 55–135)
ALT SERPL W/O P-5'-P-CCNC: 18 U/L (ref 10–44)
ANION GAP SERPL CALC-SCNC: 14 MMOL/L (ref 8–16)
AST SERPL-CCNC: 20 U/L (ref 10–40)
BACTERIA #/AREA URNS HPF: ABNORMAL /HPF
BASOPHILS # BLD AUTO: 0.03 K/UL (ref 0–0.2)
BASOPHILS NFR BLD: 0.1 % (ref 0–1.9)
BILIRUB SERPL-MCNC: 2 MG/DL (ref 0.1–1)
BILIRUB UR QL STRIP: ABNORMAL
BUN SERPL-MCNC: 27 MG/DL (ref 6–20)
CALCIUM SERPL-MCNC: 9.8 MG/DL (ref 8.7–10.5)
CHLORIDE SERPL-SCNC: 99 MMOL/L (ref 95–110)
CLARITY UR: CLEAR
CO2 SERPL-SCNC: 22 MMOL/L (ref 23–29)
COLOR UR: YELLOW
CREAT SERPL-MCNC: 1.6 MG/DL (ref 0.5–1.4)
DIFFERENTIAL METHOD BLD: ABNORMAL
EOSINOPHIL # BLD AUTO: 0 K/UL (ref 0–0.5)
EOSINOPHIL NFR BLD: 0 % (ref 0–8)
ERYTHROCYTE [DISTWIDTH] IN BLOOD BY AUTOMATED COUNT: 12.5 % (ref 11.5–14.5)
EST. GFR  (NO RACE VARIABLE): 57.3 ML/MIN/1.73 M^2
GLUCOSE SERPL-MCNC: 116 MG/DL (ref 70–110)
GLUCOSE UR QL STRIP: NEGATIVE
HCT VFR BLD AUTO: 37.5 % (ref 40–54)
HGB BLD-MCNC: 13.6 G/DL (ref 14–18)
HGB UR QL STRIP: ABNORMAL
HYALINE CASTS #/AREA URNS LPF: 0 /LPF
IMM GRANULOCYTES # BLD AUTO: 0.08 K/UL (ref 0–0.04)
IMM GRANULOCYTES NFR BLD AUTO: 0.4 % (ref 0–0.5)
KETONES UR QL STRIP: ABNORMAL
LEUKOCYTE ESTERASE UR QL STRIP: ABNORMAL
LYMPHOCYTES # BLD AUTO: 2.6 K/UL (ref 1–4.8)
LYMPHOCYTES NFR BLD: 12.4 % (ref 18–48)
MCH RBC QN AUTO: 30.1 PG (ref 27–31)
MCHC RBC AUTO-ENTMCNC: 36.3 G/DL (ref 32–36)
MCV RBC AUTO: 83 FL (ref 82–98)
MICROSCOPIC COMMENT: ABNORMAL
MONOCYTES # BLD AUTO: 1.6 K/UL (ref 0.3–1)
MONOCYTES NFR BLD: 7.7 % (ref 4–15)
NEUTROPHILS # BLD AUTO: 16.4 K/UL (ref 1.8–7.7)
NEUTROPHILS NFR BLD: 79.4 % (ref 38–73)
NITRITE UR QL STRIP: NEGATIVE
NRBC BLD-RTO: 0 /100 WBC
PH UR STRIP: 6 [PH] (ref 5–8)
PLATELET # BLD AUTO: 277 K/UL (ref 150–450)
PMV BLD AUTO: 10 FL (ref 9.2–12.9)
POTASSIUM SERPL-SCNC: 3.2 MMOL/L (ref 3.5–5.1)
PROT SERPL-MCNC: 8.4 G/DL (ref 6–8.4)
PROT UR QL STRIP: ABNORMAL
RBC # BLD AUTO: 4.52 M/UL (ref 4.6–6.2)
RBC #/AREA URNS HPF: 6 /HPF (ref 0–4)
SODIUM SERPL-SCNC: 135 MMOL/L (ref 136–145)
SP GR UR STRIP: >1.03 (ref 1–1.03)
SQUAMOUS #/AREA URNS HPF: 1 /HPF
URN SPEC COLLECT METH UR: ABNORMAL
UROBILINOGEN UR STRIP-ACNC: ABNORMAL EU/DL
WBC # BLD AUTO: 20.69 K/UL (ref 3.9–12.7)
WBC #/AREA URNS HPF: 5 /HPF (ref 0–5)

## 2024-06-26 PROCEDURE — G0378 HOSPITAL OBSERVATION PER HR: HCPCS

## 2024-06-26 PROCEDURE — 25000003 PHARM REV CODE 250: Performed by: EMERGENCY MEDICINE

## 2024-06-26 PROCEDURE — 96361 HYDRATE IV INFUSION ADD-ON: CPT

## 2024-06-26 PROCEDURE — 87591 N.GONORRHOEAE DNA AMP PROB: CPT | Performed by: EMERGENCY MEDICINE

## 2024-06-26 PROCEDURE — 99285 EMERGENCY DEPT VISIT HI MDM: CPT | Mod: 25

## 2024-06-26 PROCEDURE — 63600175 PHARM REV CODE 636 W HCPCS: Performed by: STUDENT IN AN ORGANIZED HEALTH CARE EDUCATION/TRAINING PROGRAM

## 2024-06-26 PROCEDURE — 85025 COMPLETE CBC W/AUTO DIFF WBC: CPT | Performed by: EMERGENCY MEDICINE

## 2024-06-26 PROCEDURE — 81001 URINALYSIS AUTO W/SCOPE: CPT | Performed by: EMERGENCY MEDICINE

## 2024-06-26 PROCEDURE — 25000003 PHARM REV CODE 250: Performed by: STUDENT IN AN ORGANIZED HEALTH CARE EDUCATION/TRAINING PROGRAM

## 2024-06-26 PROCEDURE — 96375 TX/PRO/DX INJ NEW DRUG ADDON: CPT

## 2024-06-26 PROCEDURE — 94760 N-INVAS EAR/PLS OXIMETRY 1: CPT

## 2024-06-26 PROCEDURE — 96365 THER/PROPH/DIAG IV INF INIT: CPT

## 2024-06-26 PROCEDURE — 87491 CHLMYD TRACH DNA AMP PROBE: CPT | Performed by: EMERGENCY MEDICINE

## 2024-06-26 PROCEDURE — 63600175 PHARM REV CODE 636 W HCPCS: Performed by: EMERGENCY MEDICINE

## 2024-06-26 PROCEDURE — 80053 COMPREHEN METABOLIC PANEL: CPT | Performed by: EMERGENCY MEDICINE

## 2024-06-26 RX ORDER — TALC
6 POWDER (GRAM) TOPICAL NIGHTLY PRN
Status: DISCONTINUED | OUTPATIENT
Start: 2024-06-26 | End: 2024-06-28 | Stop reason: HOSPADM

## 2024-06-26 RX ORDER — KETOROLAC TROMETHAMINE 30 MG/ML
15 INJECTION, SOLUTION INTRAMUSCULAR; INTRAVENOUS
Status: DISCONTINUED | OUTPATIENT
Start: 2024-06-26 | End: 2024-06-26

## 2024-06-26 RX ORDER — LANOLIN ALCOHOL/MO/W.PET/CERES
800 CREAM (GRAM) TOPICAL
Status: DISCONTINUED | OUTPATIENT
Start: 2024-06-26 | End: 2024-06-28 | Stop reason: HOSPADM

## 2024-06-26 RX ORDER — SODIUM,POTASSIUM PHOSPHATES 280-250MG
2 POWDER IN PACKET (EA) ORAL
Status: DISCONTINUED | OUTPATIENT
Start: 2024-06-26 | End: 2024-06-28 | Stop reason: HOSPADM

## 2024-06-26 RX ORDER — SODIUM CHLORIDE, SODIUM LACTATE, POTASSIUM CHLORIDE, CALCIUM CHLORIDE 600; 310; 30; 20 MG/100ML; MG/100ML; MG/100ML; MG/100ML
INJECTION, SOLUTION INTRAVENOUS CONTINUOUS
Status: DISCONTINUED | OUTPATIENT
Start: 2024-06-26 | End: 2024-06-28 | Stop reason: HOSPADM

## 2024-06-26 RX ORDER — HYDROMORPHONE HYDROCHLORIDE 1 MG/ML
1 INJECTION, SOLUTION INTRAMUSCULAR; INTRAVENOUS; SUBCUTANEOUS EVERY 4 HOURS PRN
Status: DISCONTINUED | OUTPATIENT
Start: 2024-06-27 | End: 2024-06-28 | Stop reason: HOSPADM

## 2024-06-26 RX ORDER — HYDROMORPHONE HYDROCHLORIDE 1 MG/ML
0.5 INJECTION, SOLUTION INTRAMUSCULAR; INTRAVENOUS; SUBCUTANEOUS EVERY 4 HOURS PRN
Status: DISCONTINUED | OUTPATIENT
Start: 2024-06-27 | End: 2024-06-28 | Stop reason: HOSPADM

## 2024-06-26 RX ORDER — GABAPENTIN 100 MG/1
100 CAPSULE ORAL 3 TIMES DAILY
Status: DISCONTINUED | OUTPATIENT
Start: 2024-06-27 | End: 2024-06-28 | Stop reason: HOSPADM

## 2024-06-26 RX ORDER — ONDANSETRON 4 MG/1
4 TABLET, FILM COATED ORAL EVERY 6 HOURS PRN
COMMUNITY
Start: 2024-01-10

## 2024-06-26 RX ORDER — MORPHINE SULFATE 4 MG/ML
4 INJECTION, SOLUTION INTRAMUSCULAR; INTRAVENOUS
Status: COMPLETED | OUTPATIENT
Start: 2024-06-26 | End: 2024-06-26

## 2024-06-26 RX ORDER — KETOROLAC TROMETHAMINE 30 MG/ML
15 INJECTION, SOLUTION INTRAMUSCULAR; INTRAVENOUS ONCE
Status: COMPLETED | OUTPATIENT
Start: 2024-06-26 | End: 2024-06-26

## 2024-06-26 RX ORDER — MORPHINE SULFATE 2 MG/ML
2 INJECTION, SOLUTION INTRAMUSCULAR; INTRAVENOUS EVERY 4 HOURS PRN
Status: DISCONTINUED | OUTPATIENT
Start: 2024-06-26 | End: 2024-06-26

## 2024-06-26 RX ORDER — SODIUM CHLORIDE 0.9 % (FLUSH) 0.9 %
10 SYRINGE (ML) INJECTION
Status: DISCONTINUED | OUTPATIENT
Start: 2024-06-26 | End: 2024-06-28 | Stop reason: HOSPADM

## 2024-06-26 RX ORDER — ONDANSETRON HYDROCHLORIDE 2 MG/ML
4 INJECTION, SOLUTION INTRAVENOUS EVERY 8 HOURS PRN
Status: DISCONTINUED | OUTPATIENT
Start: 2024-06-26 | End: 2024-06-28 | Stop reason: HOSPADM

## 2024-06-26 RX ORDER — SODIUM CHLORIDE 9 MG/ML
1000 INJECTION, SOLUTION INTRAVENOUS
Status: COMPLETED | OUTPATIENT
Start: 2024-06-26 | End: 2024-06-26

## 2024-06-26 RX ORDER — MORPHINE SULFATE 4 MG/ML
4 INJECTION, SOLUTION INTRAMUSCULAR; INTRAVENOUS EVERY 4 HOURS PRN
Status: DISCONTINUED | OUTPATIENT
Start: 2024-06-26 | End: 2024-06-26

## 2024-06-26 RX ORDER — BACLOFEN 10 MG/1
10 TABLET ORAL 3 TIMES DAILY
Status: DISCONTINUED | OUTPATIENT
Start: 2024-06-27 | End: 2024-06-28 | Stop reason: HOSPADM

## 2024-06-26 RX ORDER — ACETAMINOPHEN 325 MG/1
650 TABLET ORAL EVERY 8 HOURS PRN
Status: DISCONTINUED | OUTPATIENT
Start: 2024-06-26 | End: 2024-06-28 | Stop reason: HOSPADM

## 2024-06-26 RX ORDER — OXYCODONE HYDROCHLORIDE 5 MG/1
5 TABLET ORAL EVERY 6 HOURS PRN
Status: ON HOLD | COMMUNITY
Start: 2024-01-10 | End: 2024-06-28 | Stop reason: HOSPADM

## 2024-06-26 RX ORDER — LEVETIRACETAM 500 MG/1
500 TABLET ORAL 2 TIMES DAILY
Status: DISCONTINUED | OUTPATIENT
Start: 2024-06-26 | End: 2024-06-28 | Stop reason: HOSPADM

## 2024-06-26 RX ORDER — QUETIAPINE FUMARATE 100 MG/1
100 TABLET, FILM COATED ORAL NIGHTLY
Status: DISCONTINUED | OUTPATIENT
Start: 2024-06-26 | End: 2024-06-28 | Stop reason: HOSPADM

## 2024-06-26 RX ORDER — ONDANSETRON HYDROCHLORIDE 2 MG/ML
4 INJECTION, SOLUTION INTRAVENOUS
Status: COMPLETED | OUTPATIENT
Start: 2024-06-26 | End: 2024-06-26

## 2024-06-26 RX ADMIN — SODIUM CHLORIDE, POTASSIUM CHLORIDE, SODIUM LACTATE AND CALCIUM CHLORIDE 1000 ML: 600; 310; 30; 20 INJECTION, SOLUTION INTRAVENOUS at 06:06

## 2024-06-26 RX ADMIN — Medication 1 G: at 09:06

## 2024-06-26 RX ADMIN — ONDANSETRON 4 MG: 2 INJECTION INTRAMUSCULAR; INTRAVENOUS at 03:06

## 2024-06-26 RX ADMIN — KETOROLAC TROMETHAMINE 15 MG: 30 INJECTION, SOLUTION INTRAMUSCULAR; INTRAVENOUS at 10:06

## 2024-06-26 RX ADMIN — SODIUM CHLORIDE, POTASSIUM CHLORIDE, SODIUM LACTATE AND CALCIUM CHLORIDE: 600; 310; 30; 20 INJECTION, SOLUTION INTRAVENOUS at 11:06

## 2024-06-26 RX ADMIN — SODIUM CHLORIDE 500 ML: 9 INJECTION, SOLUTION INTRAVENOUS at 04:06

## 2024-06-26 RX ADMIN — HYDROMORPHONE HYDROCHLORIDE 1 MG: 1 INJECTION, SOLUTION INTRAMUSCULAR; INTRAVENOUS; SUBCUTANEOUS at 11:06

## 2024-06-26 RX ADMIN — LEVETIRACETAM 500 MG: 500 TABLET, FILM COATED ORAL at 10:06

## 2024-06-26 RX ADMIN — SODIUM CHLORIDE 1000 ML: 9 INJECTION, SOLUTION INTRAVENOUS at 09:06

## 2024-06-26 RX ADMIN — MORPHINE SULFATE 4 MG: 4 INJECTION, SOLUTION INTRAMUSCULAR; INTRAVENOUS at 09:06

## 2024-06-26 RX ADMIN — QUETIAPINE 100 MG: 100 TABLET ORAL at 10:06

## 2024-06-26 NOTE — ED PROVIDER NOTES
Encounter Date: 6/26/2024       History     Chief Complaint   Patient presents with    Groin Swelling     Testicle swelling, patient is nonverbal but types on phone, types that he has had groin swelling for the past month, had sex last night and is now having severe pain     34-year-old male with a past medical history of bipolar disorder, schizophrenia, seizures, and nonverbal at baseline presents for evaluation of groin pain.  The patient reports groin pain that started after has not sex last night.  He reports the pain radiates up to his right flank.  He denies any dysuria, hematuria, penile discharge, or fever/chills.  He reports some associated nausea and vomiting.  He denies any history of kidney stones.  His pain is worse with movement and palpation of his testicles.  There are no alleviating factors.      Review of patient's allergies indicates:  No Known Allergies  Past Medical History:   Diagnosis Date    Abnormal gait     rt foot turns out    Anxious depression     Back pain     Bipolar disorder, unspecified     Contracture of hand     rosi post seizure    Depression     Migraines     Neck pain     radiates to both arms numbness/tingling in both arms more on left arm    Nonverbal     since seizure    Photosensitivity     wears dark glasses    Schizophrenia, unspecified     Seizures     last witness seizure May 2022     Past Surgical History:   Procedure Laterality Date    CERVICAL LAMINECTOMY WITH SPINAL FUSION N/A 1/17/2023    Procedure: LAMINECTOMY, SPINE, CERVICAL, WITH FUSION;  Surgeon: Tobi Levin DO;  Location: [facility] OR;  Service: Neurosurgery;  Laterality: N/A;    FUSION OF CERVICAL SPINE BY POSTERIOR APPROACH N/A 1/17/2023    Procedure: FUSION, SPINE, CERVICAL, POSTERIOR APPROACH;  Surgeon: Tobi Levin DO;  Location: [facility] OR;  Service: Neurosurgery;  Laterality: N/A;  IOM, C-ARM, MICRO MEDTRONICS (TW notifed rep 1/12)  (POSTERIOR)    (C3-4,  C4-5, C5-6)  LAMINECTOMY WITH FUSION    SPINE  SURGERY      TYMPANOSTOMY TUBE PLACEMENT      WRIST SURGERY Left     bone infection     No family history on file.  Social History     Tobacco Use    Smoking status: Former     Current packs/day: 0.00     Average packs/day: 2.0 packs/day for 23.0 years (46.0 ttl pk-yrs)     Types: Cigarettes     Start date:      Quit date: 2023     Years since quittin.4    Smokeless tobacco: Never   Substance Use Topics    Alcohol use: Not Currently    Drug use: Never     Review of Systems   Constitutional:  Negative for chills, diaphoresis, fatigue and fever.   HENT:  Negative for congestion and rhinorrhea.    Respiratory:  Negative for cough and shortness of breath.    Cardiovascular:  Negative for chest pain.   Gastrointestinal:  Positive for nausea and vomiting. Negative for abdominal pain and diarrhea.   Genitourinary:  Positive for testicular pain. Negative for dysuria and frequency.   Musculoskeletal:  Negative for gait problem.   Skin:  Negative for color change.   Neurological:  Negative for dizziness and numbness.   Psychiatric/Behavioral:  Negative for agitation and confusion.        Physical Exam     Initial Vitals [24 1351]   BP Pulse Resp Temp SpO2   133/81 105 20 98.1 °F (36.7 °C) 98 %      MAP       --         Physical Exam    Nursing note and vitals reviewed.  Constitutional: He appears well-developed and well-nourished.   HENT:   Head: Normocephalic and atraumatic.   Eyes: EOM are normal. Pupils are equal, round, and reactive to light.   Neck: Neck supple.   Cardiovascular:  Normal rate and regular rhythm.           Pulmonary/Chest: Breath sounds normal.   Abdominal: Abdomen is soft. Bowel sounds are normal. He exhibits no distension. There is no abdominal tenderness. There is no rebound.   Genitourinary:    Genitourinary Comments: Bilateral testicular tenderness noted.  No edema or skin color changes noted.  Circumcised penis noted.  No penile tenderness or discharge noted.  ( exam done with  nurse chaperone at bedside)     Musculoskeletal:         General: Normal range of motion.      Cervical back: Neck supple.     Neurological: He is alert and oriented to person, place, and time.   Skin: Skin is warm and dry.   Psychiatric: He has a normal mood and affect.         ED Course   Critical Care    Date/Time: 6/26/2024 9:16 PM    Performed by: Patrice Roland MD  Authorized by: Patrice Roland MD  Direct patient critical care time: 16 minutes  Ordering / reviewing critical care time: 15 minutes  Documentation critical care time: 10 minutes  Consulting other physicians critical care time: 5 minutes  Total critical care time (exclusive of procedural time) : 46 minutes  Critical care was time spent personally by me on the following activities: development of treatment plan with patient or surrogate, examination of patient, obtaining history from patient or surrogate, ordering and performing treatments and interventions, ordering and review of laboratory studies and ordering and review of radiographic studies.        Labs Reviewed   COMPREHENSIVE METABOLIC PANEL - Abnormal; Notable for the following components:       Result Value    Sodium 135 (*)     Potassium 3.2 (*)     CO2 22 (*)     Glucose 116 (*)     BUN 27 (*)     Creatinine 1.6 (*)     Total Bilirubin 2.0 (*)     eGFR 57.3 (*)     All other components within normal limits   CBC W/ AUTO DIFFERENTIAL - Abnormal; Notable for the following components:    WBC 20.69 (*)     RBC 4.52 (*)     Hemoglobin 13.6 (*)     Hematocrit 37.5 (*)     MCHC 36.3 (*)     Gran # (ANC) 16.4 (*)     Immature Grans (Abs) 0.08 (*)     Mono # 1.6 (*)     Gran % 79.4 (*)     Lymph % 12.4 (*)     All other components within normal limits   URINALYSIS, REFLEX TO URINE CULTURE - Abnormal; Notable for the following components:    Specific Gravity, UA >1.030 (*)     Protein, UA 1+ (*)     Ketones, UA 3+ (*)     Bilirubin (UA) 1+ (*)     Urobilinogen, UA 2.0-3.0 (*)      Leukocytes, UA Trace (*)     All other components within normal limits    Narrative:     Specimen Source->Urine   URINALYSIS MICROSCOPIC - Abnormal; Notable for the following components:    RBC, UA 6 (*)     All other components within normal limits    Narrative:     Specimen Source->Urine   C. TRACHOMATIS/N. GONORRHOEAE BY AMP DNA   C. TRACHOMATIS/N. GONORRHOEAE BY AMP DNA   SEDIMENTATION RATE   C-REACTIVE PROTEIN          Imaging Results              CT Abdomen Pelvis  Without Contrast (Final result)  Result time 06/26/24 16:54:27      Final result by Enedina Ceja MD (06/26/24 16:54:27)                   Impression:      No evidence of renal or ureteral stones    Small fat containing umbilical hernia      Electronically signed by: Enedina Ceja  Date:    06/26/2024  Time:    16:54               Narrative:    EXAMINATION:  CT ABDOMEN PELVIS WITHOUT CONTRAST    CLINICAL HISTORY:  Flank pain, kidney stone suspected;    TECHNIQUE:  Low dose axial images, sagittal and coronal reformations were obtained from the lung bases to the pubic symphysis,    COMPARISON:  None    FINDINGS:  Heart: Normal in size. No pericardial effusion.    Lung Bases: Well aerated, without consolidation or pleural fluid.    Liver: Normal in size and attenuation, with no focal hepatic lesions.    Gallbladder: No calcified gallstones.    Bile Ducts: No evidence of dilated ducts.    Pancreas: No mass or peripancreatic fat stranding.    Spleen: Unremarkable.    Adrenals: Unremarkable.    Kidneys/ Ureters: Kidneys are symmetric in size without hydronephrosis or calculi.  There is no perinephric stranding.  There are no ureteral stones.    Bladder: No evidence of wall thickening.    Reproductive organs: Prostate gland is normal.    GI Tract/Mesentery: No evidence of bowel obstruction or inflammation.    Peritoneal Space: No ascites. No free air.    Retroperitoneum: No significant adenopathy.    Abdominal wall: Small fat containing umbilical  hernia.    Vasculature: No significant atherosclerosis or aneurysm.    Bones: No acute fracture.                                       US Scrotum And Testicles (Final result)  Result time 06/26/24 15:34:37      Final result by Praveen Kamara MD (06/26/24 15:34:37)                   Impression:      1.  No finding of spermatic cord torsion, intratesticular mass or epididymal orchitis.    2.  Moderate left and small right hydrocele.    3.  Obscuration of the left epididymis.      Electronically signed by: Praveen Kamara  Date:    06/26/2024  Time:    15:34               Narrative:    CLINICAL HISTORY:  ([id number])33 y/o  (1990) M    Testicular pain, unspecified    TECHNIQUE:  (A#[id number], exam time 6/26/2024 15:25)    US SCROTUM AND TESTICLES [id number]    Ultrasound examination of the genitalia was performed using grayscale and color Doppler    COMPARISON:  None available.    FINDINGS:  RIGHT:    Right testicle: Normal in size and echotexture. Normal vascularity on color Doppler. No mass.    Testicular size: 4.6 x 3.2 x 2.8 cm    Right epididymis: Visualized portions are normal. Normal vascularity on color Doppler.    Epididymal head size: 12 x 12 x 10 mm    Other: There is a small right hydrocele.    LEFT:    Left testicle: Normal in size and echotexture. Normal vascularity on color Doppler. No mass.    Testicular size: 2.8 x 2.1 x 1.8 cm    Left epididymis: Not clearly seen.    Other: There is a moderate left hydrocele measuring 4.6 x 2.2 cm.                                       Medications   lactated ringers infusion (has no administration in time range)   ketorolac injection 15 mg (has no administration in time range)   baclofen tablet 10 mg (has no administration in time range)   gabapentin capsule 100 mg (has no administration in time range)   levETIRAcetam tablet 500 mg (has no administration in time range)   QUEtiapine tablet 100 mg (has no administration in time range)   sodium chloride 0.9% flush  10 mL (has no administration in time range)   melatonin tablet 6 mg (has no administration in time range)   acetaminophen tablet 650 mg (has no administration in time range)   cefTRIAXone (ROCEPHIN) 1 g in dextrose 5 % 100 mL IVPB (ready to mix) (has no administration in time range)   doxycycline (VIBRAMYCIN) 100 mg in dextrose 5% 100 mL IVPB (ready to mix) (has no administration in time range)   morphine injection 4 mg (has no administration in time range)   morphine injection 2 mg (has no administration in time range)   ondansetron injection 4 mg (has no administration in time range)   potassium bicarbonate disintegrating tablet 50 mEq (has no administration in time range)   potassium bicarbonate disintegrating tablet 35 mEq (has no administration in time range)   potassium bicarbonate disintegrating tablet 60 mEq (has no administration in time range)   magnesium oxide tablet 800 mg (has no administration in time range)   magnesium oxide tablet 800 mg (has no administration in time range)   potassium, sodium phosphates 280-160-250 mg packet 2 packet (has no administration in time range)   potassium, sodium phosphates 280-160-250 mg packet 2 packet (has no administration in time range)   potassium, sodium phosphates 280-160-250 mg packet 2 packet (has no administration in time range)   ondansetron injection 4 mg (4 mg Intravenous Given 6/26/24 1506)   morphine injection 4 mg (4 mg Intravenous Given 6/26/24 2118)   sodium chloride 0.9% bolus 500 mL 500 mL (0 mLs Intravenous Stopped 6/26/24 1741)   lactated ringers bolus 1,000 mL (0 mLs Intravenous Stopped 6/26/24 2003)   0.9%  NaCl infusion (1,000 mLs Intravenous New Bag 6/26/24 2116)   cefTRIAXone (ROCEPHIN) 1 g in dextrose 5 % 100 mL IVPB (ready to mix) (1 g Intravenous New Bag 6/26/24 2115)     Medical Decision Making  34-year-old male presents with testicular pain.    Initial differential diagnosis included but not limited to testicular torsion, epididymitis,  and kidney stone.    Amount and/or Complexity of Data Reviewed  Labs: ordered.  Radiology: ordered.    Risk  Prescription drug management.  Risk Details: The patient was emergently evaluated in the emergency department, his evaluation was significant for a young male with testicular tenderness.  The patient's labs were significant for acute kidney injury, dehydration, and leukocytosis.  The patient's urine does not appear to be infected.  The patient was treated here with IV fluids and IV Zofran.  He refused IV morphine.  The patient's ultrasound showed no evidence of testicular torsion, epididymitis, or orchitis per Radiology.  The patient's CT scan showed no acute abnormalities per Radiology.  I will admit the patient to the hospitalist service for further care and workup.  The case was discussed with the hospitalist on-call, Dr. Johnson.  He has accepted the patient for admission.  The patient was also treated with a dose of IV Rocephin as well.                                      Clinical Impression:  Final diagnoses:  [N50.819] Testicular pain  [N17.9] PEEWEE (acute kidney injury) (Primary)  [E86.0] Dehydration  [D72.829] Leukocytosis, unspecified type          ED Disposition Condition    Observation Stable                Patrice Roland MD  06/26/24 [id number]

## 2024-06-27 LAB
ALBUMIN SERPL BCP-MCNC: 3.9 G/DL (ref 3.5–5.2)
ALP SERPL-CCNC: 98 U/L (ref 55–135)
ALT SERPL W/O P-5'-P-CCNC: 14 U/L (ref 10–44)
ANION GAP SERPL CALC-SCNC: 11 MMOL/L (ref 8–16)
AST SERPL-CCNC: 17 U/L (ref 10–40)
BASOPHILS # BLD AUTO: 0.02 K/UL (ref 0–0.2)
BASOPHILS NFR BLD: 0.2 % (ref 0–1.9)
BILIRUB SERPL-MCNC: 1.5 MG/DL (ref 0.1–1)
BUN SERPL-MCNC: 26 MG/DL (ref 6–20)
CALCIUM SERPL-MCNC: 8.4 MG/DL (ref 8.7–10.5)
CHLORIDE SERPL-SCNC: 105 MMOL/L (ref 95–110)
CO2 SERPL-SCNC: 24 MMOL/L (ref 23–29)
CREAT SERPL-MCNC: 1.3 MG/DL (ref 0.5–1.4)
CRP SERPL-MCNC: 1.9 MG/DL
DIFFERENTIAL METHOD BLD: ABNORMAL
EOSINOPHIL # BLD AUTO: 0.1 K/UL (ref 0–0.5)
EOSINOPHIL NFR BLD: 1 % (ref 0–8)
ERYTHROCYTE [DISTWIDTH] IN BLOOD BY AUTOMATED COUNT: 12.6 % (ref 11.5–14.5)
ERYTHROCYTE [SEDIMENTATION RATE] IN BLOOD BY WESTERGREN METHOD: 8 MM/HR (ref 0–10)
EST. GFR  (NO RACE VARIABLE): >60 ML/MIN/1.73 M^2
GLUCOSE SERPL-MCNC: 99 MG/DL (ref 70–110)
HCT VFR BLD AUTO: 33.3 % (ref 40–54)
HGB BLD-MCNC: 11.7 G/DL (ref 14–18)
IMM GRANULOCYTES # BLD AUTO: 0.04 K/UL (ref 0–0.04)
IMM GRANULOCYTES NFR BLD AUTO: 0.3 % (ref 0–0.5)
LYMPHOCYTES # BLD AUTO: 2.2 K/UL (ref 1–4.8)
LYMPHOCYTES NFR BLD: 17.8 % (ref 18–48)
MCH RBC QN AUTO: 29.8 PG (ref 27–31)
MCHC RBC AUTO-ENTMCNC: 35.1 G/DL (ref 32–36)
MCV RBC AUTO: 85 FL (ref 82–98)
MONOCYTES # BLD AUTO: 1.1 K/UL (ref 0.3–1)
MONOCYTES NFR BLD: 9.2 % (ref 4–15)
NEUTROPHILS # BLD AUTO: 8.9 K/UL (ref 1.8–7.7)
NEUTROPHILS NFR BLD: 71.5 % (ref 38–73)
NRBC BLD-RTO: 0 /100 WBC
PLATELET # BLD AUTO: 210 K/UL (ref 150–450)
PMV BLD AUTO: 9.9 FL (ref 9.2–12.9)
POTASSIUM SERPL-SCNC: 3.2 MMOL/L (ref 3.5–5.1)
PROT SERPL-MCNC: 6.7 G/DL (ref 6–8.4)
RBC # BLD AUTO: 3.92 M/UL (ref 4.6–6.2)
SODIUM SERPL-SCNC: 140 MMOL/L (ref 136–145)
WBC # BLD AUTO: 12.4 K/UL (ref 3.9–12.7)

## 2024-06-27 PROCEDURE — 25000003 PHARM REV CODE 250: Performed by: STUDENT IN AN ORGANIZED HEALTH CARE EDUCATION/TRAINING PROGRAM

## 2024-06-27 PROCEDURE — 80053 COMPREHEN METABOLIC PANEL: CPT | Performed by: STUDENT IN AN ORGANIZED HEALTH CARE EDUCATION/TRAINING PROGRAM

## 2024-06-27 PROCEDURE — 85025 COMPLETE CBC W/AUTO DIFF WBC: CPT | Performed by: STUDENT IN AN ORGANIZED HEALTH CARE EDUCATION/TRAINING PROGRAM

## 2024-06-27 PROCEDURE — 85651 RBC SED RATE NONAUTOMATED: CPT | Performed by: STUDENT IN AN ORGANIZED HEALTH CARE EDUCATION/TRAINING PROGRAM

## 2024-06-27 PROCEDURE — 86140 C-REACTIVE PROTEIN: CPT | Performed by: STUDENT IN AN ORGANIZED HEALTH CARE EDUCATION/TRAINING PROGRAM

## 2024-06-27 PROCEDURE — 63600175 PHARM REV CODE 636 W HCPCS: Performed by: STUDENT IN AN ORGANIZED HEALTH CARE EDUCATION/TRAINING PROGRAM

## 2024-06-27 PROCEDURE — 36415 COLL VENOUS BLD VENIPUNCTURE: CPT | Performed by: STUDENT IN AN ORGANIZED HEALTH CARE EDUCATION/TRAINING PROGRAM

## 2024-06-27 PROCEDURE — 12000002 HC ACUTE/MED SURGE SEMI-PRIVATE ROOM

## 2024-06-27 RX ORDER — AZITHROMYCIN 500 MG/1
500 TABLET, FILM COATED ORAL DAILY
Qty: 5 TABLET | Refills: 0 | Status: SHIPPED | OUTPATIENT
Start: 2024-06-27 | End: 2024-06-28 | Stop reason: HOSPADM

## 2024-06-27 RX ADMIN — DOXYCYCLINE 100 MG: 100 INJECTION, POWDER, LYOPHILIZED, FOR SOLUTION INTRAVENOUS at 12:06

## 2024-06-27 RX ADMIN — BACLOFEN 10 MG: 10 TABLET ORAL at 08:06

## 2024-06-27 RX ADMIN — LEVETIRACETAM 500 MG: 500 TABLET, FILM COATED ORAL at 08:06

## 2024-06-27 RX ADMIN — QUETIAPINE 100 MG: 100 TABLET ORAL at 08:06

## 2024-06-27 RX ADMIN — BACLOFEN 10 MG: 10 TABLET ORAL at 02:06

## 2024-06-27 RX ADMIN — ONDANSETRON 4 MG: 2 INJECTION INTRAMUSCULAR; INTRAVENOUS at 01:06

## 2024-06-27 RX ADMIN — DOXYCYCLINE 100 MG: 100 INJECTION, POWDER, LYOPHILIZED, FOR SOLUTION INTRAVENOUS at 11:06

## 2024-06-27 RX ADMIN — GABAPENTIN 100 MG: 100 CAPSULE ORAL at 08:06

## 2024-06-27 RX ADMIN — CEFTRIAXONE SODIUM 1 G: 1 INJECTION, POWDER, FOR SOLUTION INTRAMUSCULAR; INTRAVENOUS at 11:06

## 2024-06-27 RX ADMIN — GABAPENTIN 100 MG: 100 CAPSULE ORAL at 02:06

## 2024-06-27 RX ADMIN — HYDROMORPHONE HYDROCHLORIDE 1 MG: 1 INJECTION, SOLUTION INTRAMUSCULAR; INTRAVENOUS; SUBCUTANEOUS at 04:06

## 2024-06-27 NOTE — PLAN OF CARE
Discharge orders and chart reviewed with no further post-acute discharge needs identified at this time.     Doctor's office will call to schedule appointment. Family/friends will transport home.    At this time, patient is cleared for discharge from Case Management standpoint.       06/27/24 0947   Final Note   Assessment Type Final Discharge Note   Anticipated Discharge Disposition Home   What phone number can be called within the next 1-3 days to see how you are doing after discharge? [phone]   Post-Acute Status   Coverage MEDICARE - MEDICARE PART A & B   Discharge Delays None known at this time

## 2024-06-27 NOTE — ASSESSMENT & PLAN NOTE
Patient with acute kidney injury/acute renal failure likely due to pre-renal azotemia due to IVVD PEEWEE is currently stable. Baseline creatinine  .8  - Labs reviewed- Renal function/electrolytes with Estimated Creatinine Clearance: 84.7 mL/min (based on SCr of 1.3 mg/dL). according to latest data. Monitor urine output and serial BMP and adjust therapy as needed. Avoid nephrotoxins and renally dose meds for GFR listed above.    Cr improved from 1.6 >> 1.3  IVF for today

## 2024-06-27 NOTE — HOSPITAL COURSE
Patient is a 34 year old male, who is aphasic came with bilateral groin pain. CT abdomen and testicular US was negative for acute findings, patient was started on Rocephin and Doxycycline for possible epididymitis. Patient was started on Rocephin and Doxycycline. Chlamydia and gonorrhea PCR still pending. Pain is improved today. Patient discharge home with 10 days of Levaquin.

## 2024-06-27 NOTE — ASSESSMENT & PLAN NOTE
Aphasic chronically, follows with Neurology outpatient   Apparently this started after seizures?  Writes on paper to communicate

## 2024-06-27 NOTE — ASSESSMENT & PLAN NOTE
leukocytosis and scrotum tender to palpation we will empirically treat for acute epididymitis  Urinalysis does not seem consistent with UTI   No torsion seen on ultrasound  Leukocytosis down trending   CRP mildly elevated, sed rate negative   - Started on empiric Rocephin and doxycycline  - Morphine and Toradol p.r.n. for pain  - Follow up gonorrhea/chlamydia DNA amplification

## 2024-06-27 NOTE — SUBJECTIVE & OBJECTIVE
Past Medical History:   Diagnosis Date    Abnormal gait     rt foot turns out    Anxious depression     Back pain     Bipolar disorder, unspecified     Contracture of hand     rosi post seizure    Depression     Migraines     Neck pain     radiates to both arms numbness/tingling in both arms more on left arm    Nonverbal     since seizure    Photosensitivity     wears dark glasses    Schizophrenia, unspecified     Seizures     last witness seizure May 2022       Past Surgical History:   Procedure Laterality Date    CERVICAL LAMINECTOMY WITH SPINAL FUSION N/A 1/17/2023    Procedure: LAMINECTOMY, SPINE, CERVICAL, WITH FUSION;  Surgeon: Tobi Levin DO;  Location: [facility] OR;  Service: Neurosurgery;  Laterality: N/A;    FUSION OF CERVICAL SPINE BY POSTERIOR APPROACH N/A 1/17/2023    Procedure: FUSION, SPINE, CERVICAL, POSTERIOR APPROACH;  Surgeon: Tobi Levin DO;  Location: [facility] OR;  Service: Neurosurgery;  Laterality: N/A;  IOM, C-ARM, MICRO MEDTRONICS (TW notifed rep 1/12)  (POSTERIOR)    (C3-4,  C4-5, C5-6)  LAMINECTOMY WITH FUSION    SPINE SURGERY      TYMPANOSTOMY TUBE PLACEMENT      WRIST SURGERY Left     bone infection       Review of patient's allergies indicates:  No Known Allergies    No current facility-administered medications on file prior to encounter.     Current Outpatient Medications on File Prior to Encounter   Medication Sig    baclofen (LIORESAL) 10 MG tablet Take 10 mg by mouth 3 (three) times daily.    cefadroxil (DURICEF) 500 MG Cap Take 1 capsule (500 mg total) by mouth every 12 (twelve) hours. (Patient not taking: Reported on 3/20/2023)    gabapentin (NEURONTIN) 100 MG capsule Take 1 capsule (100 mg total) by mouth 3 (three) times daily.    HYDROcodone-acetaminophen (NORCO) 5-325 mg per tablet Take 1 tablet by mouth every 6 (six) hours as needed for Pain.    levETIRAcetam (KEPPRA) 500 MG Tab Take 500 mg by mouth 2 (two) times daily.    oxyCODONE-acetaminophen (ENDOCET) 5-325 mg per tablet  Take 1 tablet by mouth every 8 (eight) hours as needed for Pain. (Patient not taking: Reported on 3/20/2023)    oxyCODONE-acetaminophen (PERCOCET) 7.5-325 mg per tablet Take 1 tablet by mouth every 6 (six) hours as needed for Pain. (Patient not taking: Reported on 3/20/2023)    QUEtiapine (SEROQUEL) 100 MG Tab Take 100 mg by mouth every evening.     Family History    None       Tobacco Use    Smoking status: Former     Current packs/day: 0.00     Average packs/day: 2.0 packs/day for 23.0 years (46.0 ttl pk-yrs)     Types: Cigarettes     Start date:      Quit date: 2023     Years since quittin.4    Smokeless tobacco: Never   Substance and Sexual Activity    Alcohol use: Not Currently    Drug use: Never    Sexual activity: Not on file     Review of Systems   Constitutional:  Positive for chills. Negative for fatigue.   Respiratory:  Negative for shortness of breath and wheezing.    Cardiovascular:  Negative for chest pain and palpitations.   Gastrointestinal:  Positive for nausea.   Genitourinary:  Positive for scrotal swelling and testicular pain.   Skin:  Positive for rash (groin).   Neurological:  Positive for speech difficulty (chronic). Negative for dizziness.     Objective:     Vital Signs (Most Recent):  Temp: 98.1 °F (36.7 °C) (24 1351)  Pulse: 80 (24)  Resp: (!) 21 (24)  BP: 111/70 (24)  SpO2: 100 % (24) Vital Signs (24h Range):  Temp:  [98.1 °F (36.7 °C)] 98.1 °F (36.7 °C)  Pulse:  [] 80  Resp:  [20-21] 21  SpO2:  [98 %-100 %] 100 %  BP: (111-138)/(58-88) 111/70     Weight: 83.9 kg (185 lb)  Body mass index is 28.13 kg/m².     Physical Exam  Vitals reviewed.   Constitutional:       General: He is in acute distress.   HENT:      Head: Atraumatic.   Cardiovascular:      Rate and Rhythm: Normal rate and regular rhythm.   Pulmonary:      Effort: No respiratory distress.      Breath sounds: No wheezing.   Abdominal:      General: There is no  distension.      Tenderness: There is no abdominal tenderness.   Genitourinary:     Comments: Scrotum ttp, mild swelling   Rash around groin   Musculoskeletal:      Comments: Left hand contracture    Skin:     General: Skin is warm and dry.   Neurological:      Mental Status: He is alert. Mental status is at baseline.      Comments: Aphasic, chronic                 Significant Labs: All pertinent labs within the past 24 hours have been reviewed.  CBC:   Recent Labs   Lab 06/26/24  1503   WBC 20.69*   HGB 13.6*   HCT 37.5*        CMP:   Recent Labs   Lab 06/26/24  1503   *   K 3.2*   CL 99   CO2 22*   *   BUN 27*   CREATININE 1.6*   CALCIUM 9.8   PROT 8.4   ALBUMIN 5.0   BILITOT 2.0*   ALKPHOS 124   AST 20   ALT 18   ANIONGAP 14       Significant Imaging: I have reviewed all pertinent imaging results/findings within the past 24 hours.

## 2024-06-27 NOTE — NURSING
Nurses Note -- 4 Eyes      6/27/2024   4:29 AM      Skin assessed during: Admit      [] No Altered Skin Integrity Present    []Prevention Measures Documented      [x] Yes- Altered Skin Integrity Present or Discovered   [] LDA Added if Not in Epic (Describe Wound)   [] New Altered Skin Integrity was Present on Admit and Documented in LDA   [] Wound Image Taken    Wound Care Consulted? No    Attending Nurse:  Ashly Askew RN/Staff Member:   Carol

## 2024-06-27 NOTE — H&P
[facility] - Emergency Dept  Hospital Medicine  History & Physical    Patient Name: Phong Fofana  MRN: [id number]  Patient Class: OP- Observation  Admission Date: 6/26/2024  Attending Physician: Jennifer Johnson MD   Primary Care Provider: Anamika Primary Doctor         Patient information was obtained from patient and ER records.     Subjective:     Principal Problem:Testicular pain    Chief Complaint:   Chief Complaint   Patient presents with    Groin Swelling     Testicle swelling, patient is nonverbal but types on phone, types that he has had groin swelling for the past month, had sex last night and is now having severe pain        HPI: Patient is a 34-year-old male with history of seizure disorder, schizophrenia, aphasic at baseline, presents to the emergency room with complaints of groin pain.  Patient nonverbal, writes on paper in order to communicate.  Reports pain started after having sex tonight.  Reports never having symptoms like this before.  Denies any dysuria, penile discharge.  Reports tenderness to the scrotum.  Reports using protection.  Denies any chest pain, fever, chills, abdominal pain.  CT abdomen and pelvis showed small containing umbilical hernia, no  renal stones.  Scrotum ultrasound showed no findings of spermatic cord torsion, intra testicular mass or orchitis.  Obscuration of the left epididymis.  On admission patient noted to have elevated white blood count, severe pain, PEEWEE.  Patient being admitted to be treated for possible epididymitis.    Past Medical History:   Diagnosis Date    Abnormal gait     rt foot turns out    Anxious depression     Back pain     Bipolar disorder, unspecified     Contracture of hand     rosi post seizure    Depression     Migraines     Neck pain     radiates to both arms numbness/tingling in both arms more on left arm    Nonverbal     since seizure    Photosensitivity     wears dark glasses    Schizophrenia, unspecified     Seizures     last  witness seizure May 2022       Past Surgical History:   Procedure Laterality Date    CERVICAL LAMINECTOMY WITH SPINAL FUSION N/A 1/17/2023    Procedure: LAMINECTOMY, SPINE, CERVICAL, WITH FUSION;  Surgeon: Tobi Levin DO;  Location: [facility] OR;  Service: Neurosurgery;  Laterality: N/A;    FUSION OF CERVICAL SPINE BY POSTERIOR APPROACH N/A 1/17/2023    Procedure: FUSION, SPINE, CERVICAL, POSTERIOR APPROACH;  Surgeon: Tobi Levin DO;  Location: [facility] OR;  Service: Neurosurgery;  Laterality: N/A;  IOM, C-ARM, MICRO MEDTRONICS (TW notifed rep 1/12)  (POSTERIOR)    (C3-4,  C4-5, C5-6)  LAMINECTOMY WITH FUSION    SPINE SURGERY      TYMPANOSTOMY TUBE PLACEMENT      WRIST SURGERY Left     bone infection       Review of patient's allergies indicates:  No Known Allergies    No current facility-administered medications on file prior to encounter.     Current Outpatient Medications on File Prior to Encounter   Medication Sig    baclofen (LIORESAL) 10 MG tablet Take 10 mg by mouth 3 (three) times daily.    cefadroxil (DURICEF) 500 MG Cap Take 1 capsule (500 mg total) by mouth every 12 (twelve) hours. (Patient not taking: Reported on 3/20/2023)    gabapentin (NEURONTIN) 100 MG capsule Take 1 capsule (100 mg total) by mouth 3 (three) times daily.    HYDROcodone-acetaminophen (NORCO) 5-325 mg per tablet Take 1 tablet by mouth every 6 (six) hours as needed for Pain.    levETIRAcetam (KEPPRA) 500 MG Tab Take 500 mg by mouth 2 (two) times daily.    oxyCODONE-acetaminophen (ENDOCET) 5-325 mg per tablet Take 1 tablet by mouth every 8 (eight) hours as needed for Pain. (Patient not taking: Reported on 3/20/2023)    oxyCODONE-acetaminophen (PERCOCET) 7.5-325 mg per tablet Take 1 tablet by mouth every 6 (six) hours as needed for Pain. (Patient not taking: Reported on 3/20/2023)    QUEtiapine (SEROQUEL) 100 MG Tab Take 100 mg by mouth every evening.     Family History    None       Tobacco Use    Smoking status: Former     Current  packs/day: 0.00     Average packs/day: 2.0 packs/day for 23.0 years (46.0 ttl pk-yrs)     Types: Cigarettes     Start date:      Quit date: 2023     Years since quittin.4    Smokeless tobacco: Never   Substance and Sexual Activity    Alcohol use: Not Currently    Drug use: Never    Sexual activity: Not on file     Review of Systems   Constitutional:  Positive for chills. Negative for fatigue.   Respiratory:  Negative for shortness of breath and wheezing.    Cardiovascular:  Negative for chest pain and palpitations.   Gastrointestinal:  Positive for nausea.   Genitourinary:  Positive for scrotal swelling and testicular pain.   Skin:  Positive for rash (groin).   Neurological:  Positive for speech difficulty (chronic). Negative for dizziness.     Objective:     Vital Signs (Most Recent):  Temp: 98.1 °F (36.7 °C) (24 1351)  Pulse: 80 (24)  Resp: (!) 21 (24)  BP: 111/70 (24)  SpO2: 100 % (24) Vital Signs (24h Range):  Temp:  [98.1 °F (36.7 °C)] 98.1 °F (36.7 °C)  Pulse:  [] 80  Resp:  [20-21] 21  SpO2:  [98 %-100 %] 100 %  BP: (111-138)/(58-88) 111/70     Weight: 83.9 kg (185 lb)  Body mass index is 28.13 kg/m².     Physical Exam  Vitals reviewed.   Constitutional:       General: He is in acute distress.   HENT:      Head: Atraumatic.   Cardiovascular:      Rate and Rhythm: Normal rate and regular rhythm.   Pulmonary:      Effort: No respiratory distress.      Breath sounds: No wheezing.   Abdominal:      General: There is no distension.      Tenderness: There is no abdominal tenderness.   Genitourinary:     Comments: Scrotum ttp, mild swelling   Rash around groin   Musculoskeletal:      Comments: Left hand contracture    Skin:     General: Skin is warm and dry.   Neurological:      Mental Status: He is alert. Mental status is at baseline.      Comments: Aphasic, chronic                 Significant Labs: All pertinent labs within the past 24 hours have  been reviewed.  CBC:   Recent Labs   Lab 06/26/24  1503   WBC 20.69*   HGB 13.6*   HCT 37.5*        CMP:   Recent Labs   Lab 06/26/24  1503   *   K 3.2*   CL 99   CO2 22*   *   BUN 27*   CREATININE 1.6*   CALCIUM 9.8   PROT 8.4   ALBUMIN 5.0   BILITOT 2.0*   ALKPHOS 124   AST 20   ALT 18   ANIONGAP 14       Significant Imaging: I have reviewed all pertinent imaging results/findings within the past 24 hours.  Assessment/Plan:     * Testicular pain  Given elevated white blood cell count and scrotum tender to palpation we will empirically treat for acute epididymitis  Follow up gonorrhea/chlamydia DNA amplification  Urinalysis does not seem consistent with UTI   Started on empiric Rocephin and doxycycline  Morphine and Toradol p.r.n. for pain  No torsion seen on ultrasound  Follow up CRP, ESR, CBC      PEEWEE (acute kidney injury)  Patient with acute kidney injury/acute renal failure likely due to pre-renal azotemia due to IVVD PEEWEE is currently stable. Baseline creatinine  .8  - Labs reviewed- Renal function/electrolytes with CrCl cannot be calculated (Unknown ideal weight.). according to latest data. Monitor urine output and serial BMP and adjust therapy as needed. Avoid nephrotoxins and renally dose meds for GFR listed above.    Continue IV fluids    Dysarthria  Aphasic chronically, follows with Neurology outpatient   Apparently this started after seizures?  Writes on paper to communicate       Contracture of hand  Chronic, follows with Neurology outpatient        VTE Risk Mitigation (From admission, onward)           Ordered     IP VTE LOW RISK PATIENT  Once         06/26/24 2118     Place sequential compression device  Until discontinued         06/26/24 2118                       On 06/26/2024, patient should be placed in hospital observation services under my care.             Jennifer Johnson MD  Department of Hospital Medicine  [facility] - Emergency Dept

## 2024-06-27 NOTE — PROGRESS NOTES
[facility] Medicine  Progress Note    Patient Name: Phogn Fofana  MRN: [id number]  Patient Class: OP- Observation   Admission Date: 6/26/2024  Length of Stay: 0 days  Attending Physician: Cyril Harry MD  Primary Care Provider: Anamika, Primary Doctor        Subjective:     Principal Problem:Testicular pain        HPI:  Patient is a 34-year-old male with history of seizure disorder, schizophrenia, aphasic at baseline, presents to the emergency room with complaints of groin pain.  Patient nonverbal, writes on paper in order to communicate.  Reports pain started after having sex tonight.  Reports never having symptoms like this before.  Denies any dysuria, penile discharge.  Reports tenderness to the scrotum.  Reports using protection.  Denies any chest pain, fever, chills, abdominal pain.  CT abdomen and pelvis showed small containing umbilical hernia, no  renal stones.  Scrotum ultrasound showed no findings of spermatic cord torsion, intra testicular mass or orchitis.  Obscuration of the left epididymis.  On admission patient noted to have elevated white blood count, severe pain, PEEWEE.  Patient being admitted to be treated for possible epididymitis.    Overview/Hospital Course:  Patient is a 34 year old male, who is aphasic came with bilateral groin pain. CT abdomen and testicular US was negative for acute findings, patient was started on Rocephin and Doxycycline for possible epididymitis.     Interval History: Patient states that he is in pain. Pain has never left. However this morning for the nurses he had said no pain. Patient is afebrile.     Review of Systems  Objective:     Vital Signs (Most Recent):  Temp: 97.5 °F (36.4 °C) (06/27/24 0740)  Pulse: 60 (06/27/24 0740)  Resp: 18 (06/27/24 0740)  BP: 121/67 (06/27/24 0740)  SpO2: 99 % (06/27/24 0740) Vital Signs (24h Range):  Temp:  [97.5 °F (36.4 °C)-98.1 °F (36.7 °C)] 97.5 °F (36.4 °C)  Pulse:  [] 60  Resp:  [18-26]  18  SpO2:  [97 %-100 %] 99 %  BP: (111-138)/(58-88) 121/67     Weight: 86.1 kg (189 lb 13.1 oz)  Body mass index is 28.86 kg/m².    Intake/Output Summary (Last 24 hours) at 6/27/2024 1053  Last data filed at 6/27/2024 0839  Gross per 24 hour   Intake 2644.34 ml   Output 0 ml   Net 2644.34 ml         Physical Exam  Vitals reviewed.   Constitutional:       General: He is in acute distress.   HENT:      Head: Atraumatic.   Cardiovascular:      Rate and Rhythm: Normal rate and regular rhythm.   Pulmonary:      Effort: No respiratory distress.      Breath sounds: No wheezing.   Abdominal:      General: There is no distension.      Tenderness: There is no abdominal tenderness.   Genitourinary:     Comments: both scrotum looks nonerythematous. No swelling. Tender. There is also tenderness on both groin with macular patch in the supra pubic area.   Musculoskeletal:      Comments: Left hand contracture    Skin:     General: Skin is warm and dry.   Neurological:      Mental Status: He is alert. Mental status is at baseline.      Comments: Aphasic, chronic          Significant Labs: All pertinent labs within the past 24 hours have been reviewed.  CBC:   Recent Labs   Lab 06/26/24  1503 06/27/24  0340   WBC 20.69* 12.40   HGB 13.6* 11.7*   HCT 37.5* 33.3*    210     CMP:   Recent Labs   Lab 06/26/24  1503 06/27/24  0340   * 140   K 3.2* 3.2*   CL 99 105   CO2 22* 24   * 99   BUN 27* 26*   CREATININE 1.6* 1.3   CALCIUM 9.8 8.4*   PROT 8.4 6.7   ALBUMIN 5.0 3.9   BILITOT 2.0* 1.5*   ALKPHOS 124 98   AST 20 17   ALT 18 14   ANIONGAP 14 11       Significant Imaging: I have reviewed all pertinent imaging results/findings within the past 24 hours.    Assessment/Plan:      * Testicular pain  leukocytosis and scrotum tender to palpation we will empirically treat for acute epididymitis  Urinalysis does not seem consistent with UTI   No torsion seen on ultrasound  Leukocytosis down trending   CRP mildly elevated, sed  rate negative   - Started on empiric Rocephin and doxycycline  - Morphine and Toradol p.r.n. for pain  - Follow up gonorrhea/chlamydia DNA amplification    PEEWEE (acute kidney injury)  Patient with acute kidney injury/acute renal failure likely due to pre-renal azotemia due to IVVD PEEWEE is currently stable. Baseline creatinine  .8  - Labs reviewed- Renal function/electrolytes with Estimated Creatinine Clearance: 84.7 mL/min (based on SCr of 1.3 mg/dL). according to latest data. Monitor urine output and serial BMP and adjust therapy as needed. Avoid nephrotoxins and renally dose meds for GFR listed above.    Cr improved from 1.6 >> 1.3  IVF for today     Dysarthria  Aphasic chronically, follows with Neurology outpatient   Apparently this started after seizures?  Writes on paper to communicate       Contracture of hand  Chronic, follows with Neurology outpatient        VTE Risk Mitigation (From admission, onward)           Ordered     IP VTE LOW RISK PATIENT  Once         06/26/24 2118     Place sequential compression device  Until discontinued         06/26/24 2118                    Discharge Planning   ANIYAH: 6/27/2024     Code Status: Full Code   Is the patient medically ready for discharge?:     Reason for patient still in hospital (select all that apply): Treatment  Discharge Plan A: Home with family   Discharge Delays: None known at this time              Cyril Harry MD  Department of Hospital Medicine   [facility]ly [facility]

## 2024-06-27 NOTE — HPI
Patient is a 34-year-old male with history of seizure disorder, schizophrenia, aphasic at baseline, presents to the emergency room with complaints of groin pain.  Patient nonverbal, writes on paper in order to communicate.  Reports pain started after having sex tonight.  Reports never having symptoms like this before.  Denies any dysuria, penile discharge.  Reports tenderness to the scrotum.  Reports using protection.  Denies any chest pain, fever, chills, abdominal pain.  CT abdomen and pelvis showed small containing umbilical hernia, no  renal stones.  Scrotum ultrasound showed no findings of spermatic cord torsion, intra testicular mass or orchitis.  Obscuration of the left epididymis.  On admission patient noted to have elevated white blood count, severe pain, PEEWEE.  Patient being admitted to be treated for possible epididymitis.

## 2024-06-27 NOTE — PLAN OF CARE
Problem: Adult Inpatient Plan of Care  Goal: Plan of Care Review  Outcome: Met  Goal: Patient-Specific Goal (Individualized)  Outcome: Met  Goal: Absence of Hospital-Acquired Illness or Injury  Outcome: Met  Goal: Optimal Comfort and Wellbeing  Outcome: Met  Goal: Readiness for Transition of Care  Outcome: Met     Problem: Acute Kidney Injury/Impairment  Goal: Fluid and Electrolyte Balance  Outcome: Met  Goal: Improved Oral Intake  Outcome: Met  Goal: Effective Renal Function  Outcome: Met     Problem: Skin Injury Risk Increased  Goal: Skin Health and Integrity  Outcome: Met     Problem: Wound  Goal: Optimal Coping  Outcome: Met  Goal: Optimal Functional Ability  Outcome: Met  Goal: Absence of Infection Signs and Symptoms  Outcome: Met  Goal: Improved Oral Intake  Outcome: Met  Goal: Optimal Pain Control and Function  Outcome: Met  Goal: Skin Health and Integrity  Outcome: Met  Goal: Optimal Wound Healing  Outcome: Met

## 2024-06-27 NOTE — NURSING
Pt admitted to room 1102, bedside report taken.  He is visibly shaking he wrote it is from the pain.  He is notn verbal and gets agitated when I am unable to read his writing.  I have messaged the doctor to get more or different pain medication and I have given new ordered pain medication to him.. he seems slightly more comfortable at this time.  He is on room air, no tele monitor in place.  His personal belongings are with him at bedside.  He has pen and paper with a Health Care Communication Board.  He has a rash in his groin area.  No scrotal swelling noted on my assessment.  I am unsure of the pt ability to ambulate or what level his ADL's he can do.  He was unable to move himself from the stretcher to the bed and had me and the nurse slide him.  His call light is in place, his baseline is nonverbal.

## 2024-06-27 NOTE — ASSESSMENT & PLAN NOTE
Patient with acute kidney injury/acute renal failure likely due to pre-renal azotemia due to IVVD PEEWEE is currently stable. Baseline creatinine  .8  - Labs reviewed- Renal function/electrolytes with CrCl cannot be calculated (Unknown ideal weight.). according to latest data. Monitor urine output and serial BMP and adjust therapy as needed. Avoid nephrotoxins and renally dose meds for GFR listed above.    Continue IV fluids

## 2024-06-27 NOTE — PLAN OF CARE
Problem: Adult Inpatient Plan of Care  Goal: Plan of Care Review  Outcome: Progressing     Problem: Acute Kidney Injury/Impairment  Goal: Fluid and Electrolyte Balance  Outcome: Progressing     Problem: Skin Injury Risk Increased  Goal: Skin Health and Integrity  Outcome: Progressing     Problem: Wound  Goal: Optimal Coping  Outcome: Progressing

## 2024-06-27 NOTE — NURSING
Pt refused picture of his scrotal / penal area for skin assessment.  Rash to groin and inner thigh noted.  No open skin noted.  He has random small scabs noted on his body.

## 2024-06-27 NOTE — PLAN OF CARE
Discharge held d/t complaints of continued pain    CM following     06/27/24 1302   Discharge Reassessment   Assessment Type Discharge Planning Reassessment   Did the patient's condition or plan change since previous assessment? Yes   Post-Acute Status   Discharge Delays (!) Change in Medical Condition

## 2024-06-27 NOTE — SUBJECTIVE & OBJECTIVE
Interval History: Patient states that he is in pain. Pain has never left. However this morning for the nurses he had said no pain. Patient is afebrile.     Review of Systems  Objective:     Vital Signs (Most Recent):  Temp: 97.5 °F (36.4 °C) (06/27/24 0740)  Pulse: 60 (06/27/24 0740)  Resp: 18 (06/27/24 0740)  BP: 121/67 (06/27/24 0740)  SpO2: 99 % (06/27/24 0740) Vital Signs (24h Range):  Temp:  [97.5 °F (36.4 °C)-98.1 °F (36.7 °C)] 97.5 °F (36.4 °C)  Pulse:  [] 60  Resp:  [18-26] 18  SpO2:  [97 %-100 %] 99 %  BP: (111-138)/(58-88) 121/67     Weight: 86.1 kg (189 lb 13.1 oz)  Body mass index is 28.86 kg/m².    Intake/Output Summary (Last 24 hours) at 6/27/2024 1053  Last data filed at 6/27/2024 0839  Gross per 24 hour   Intake 2644.34 ml   Output 0 ml   Net 2644.34 ml         Physical Exam  Vitals reviewed.   Constitutional:       General: He is in acute distress.   HENT:      Head: Atraumatic.   Cardiovascular:      Rate and Rhythm: Normal rate and regular rhythm.   Pulmonary:      Effort: No respiratory distress.      Breath sounds: No wheezing.   Abdominal:      General: There is no distension.      Tenderness: There is no abdominal tenderness.   Genitourinary:     Comments: both scrotum looks nonerythematous. No swelling. Tender. There is also tenderness on both groin with macular patch in the supra pubic area.   Musculoskeletal:      Comments: Left hand contracture    Skin:     General: Skin is warm and dry.   Neurological:      Mental Status: He is alert. Mental status is at baseline.      Comments: Aphasic, chronic          Significant Labs: All pertinent labs within the past 24 hours have been reviewed.  CBC:   Recent Labs   Lab 06/26/24  1503 06/27/24  0340   WBC 20.69* 12.40   HGB 13.6* 11.7*   HCT 37.5* 33.3*    210     CMP:   Recent Labs   Lab 06/26/24  1503 06/27/24  0340   * 140   K 3.2* 3.2*   CL 99 105   CO2 22* 24   * 99   BUN 27* 26*   CREATININE 1.6* 1.3   CALCIUM 9.8  8.4*   PROT 8.4 6.7   ALBUMIN 5.0 3.9   BILITOT 2.0* 1.5*   ALKPHOS 124 98   AST 20 17   ALT 18 14   ANIONGAP 14 11       Significant Imaging: I have reviewed all pertinent imaging results/findings within the past 24 hours.

## 2024-06-27 NOTE — PLAN OF CARE
[facility]  Initial Discharge Assessment    Assessment completed at bedside with Pt, and all information on FaceSheet confirmed, including demographics, PCP, pharmacy and insurance. Pt has not addressed advance directives. He currently lives in [place] with his parents. He currently does not have a PCP and last appointment was a year ago. He denies any DME/HH/HD/Blood Thinners. Step father or neighbor will transport back home after discharge. He denies any recent hospitalizations. Plan for discharge is to return home. Case Management to continue to follow for discharge planning needs.        Primary Care Provider: No, Primary Doctor    Admission Diagnosis: PEEWEE (acute kidney injury) [N17.9]    Admission Date: 6/26/2024  Expected Discharge Date: 6/27/2024    Transition of Care Barriers: None    Payor: MEDICARE / Plan: MEDICARE PART A & B / Product Type: Government /     Extended Emergency Contact Information  Primary Emergency Contact: Jerrell Wu  Mobile Phone: [phone]  Relation: Step parent  Preferred language: English   needed? Yes  Secondary Emergency Contact: Bailey Kulkarni  Mobile Phone: [phone]  Relation: Mother    Discharge Plan A: Home with family  Discharge Plan B: Home with family      [id number] - [place] - [address] [facility] [address]  [address] [address]  Phone: [phone] Fax: [phone]      Initial Assessment (most recent)       Adult Discharge Assessment - 06/27/24 0930          Discharge Assessment    Assessment Type Discharge Planning Assessment     Confirmed/corrected address, phone number and insurance Yes     Confirmed Demographics Correct on Facesheet     Source of Information family     When was your last doctors appointment? --   A year ago    Does patient/caregiver understand observation status Yes     Reason For Admission PEEWEE     Do you expect to return to your current living  situation? Yes     Do you have help at home or someone to help you manage your care at home? Yes     Who are your caregiver(s) and their phone number(s)? Jerrell Wu (Step parent)  [phone] (Mobile)     Prior to hospitilization cognitive status: Alert/Oriented     Current cognitive status: Alert/Oriented     Walking or Climbing Stairs Difficulty no     Dressing/Bathing Difficulty no     Home Accessibility wheelchair accessible     Equipment Currently Used at Home other (see comments)   Tobiidynonavox. This a speaking device. Pt is non-verbal.    Readmission within 30 days? No     Patient currently being followed by outpatient case management? No     Do you currently have service(s) that help you manage your care at home? No     Do you take prescription medications? Yes     Do you have prescription coverage? Yes     Coverage MEDICARE - MEDICARE PART A & B     Is the patient taking medications as prescribed? yes     Who is going to help you get home at discharge? neighbor or des Allan.     How do you get to doctors appointments? family or friend will provide     Are you on dialysis? No     Do you take coumadin? No     Discharge Plan A Home with family     Discharge Plan B Home with family     DME Needed Upon Discharge  none     Discharge Plan discussed with: Parent(s)     Name(s) and Number(s) Jerrell Wu (Step parent)  [phone] (Mobile)     Transition of Care Barriers None

## 2024-06-27 NOTE — ASSESSMENT & PLAN NOTE
Given elevated white blood cell count and scrotum tender to palpation we will empirically treat for acute epididymitis  Follow up gonorrhea/chlamydia DNA amplification  Urinalysis does not seem consistent with UTI   Started on empiric Rocephin and doxycycline  Morphine and Toradol p.r.n. for pain  No torsion seen on ultrasound  Follow up CRP, ESR, CBC

## 2024-06-28 VITALS
BODY MASS INDEX: 28.77 KG/M2 | HEART RATE: 62 BPM | HEIGHT: 68 IN | RESPIRATION RATE: 18 BRPM | WEIGHT: 189.81 LBS | OXYGEN SATURATION: 98 % | DIASTOLIC BLOOD PRESSURE: 72 MMHG | TEMPERATURE: 98 F | SYSTOLIC BLOOD PRESSURE: 112 MMHG

## 2024-06-28 LAB
ALBUMIN SERPL BCP-MCNC: 3.5 G/DL (ref 3.5–5.2)
ALP SERPL-CCNC: 89 U/L (ref 55–135)
ALT SERPL W/O P-5'-P-CCNC: 13 U/L (ref 10–44)
ANION GAP SERPL CALC-SCNC: 5 MMOL/L (ref 8–16)
AST SERPL-CCNC: 14 U/L (ref 10–40)
BASOPHILS # BLD AUTO: 0.02 K/UL (ref 0–0.2)
BASOPHILS NFR BLD: 0.3 % (ref 0–1.9)
BILIRUB SERPL-MCNC: 0.4 MG/DL (ref 0.1–1)
BUN SERPL-MCNC: 15 MG/DL (ref 6–20)
CALCIUM SERPL-MCNC: 8.3 MG/DL (ref 8.7–10.5)
CHLORIDE SERPL-SCNC: 107 MMOL/L (ref 95–110)
CO2 SERPL-SCNC: 28 MMOL/L (ref 23–29)
CREAT SERPL-MCNC: 0.9 MG/DL (ref 0.5–1.4)
DIFFERENTIAL METHOD BLD: ABNORMAL
EOSINOPHIL # BLD AUTO: 0.3 K/UL (ref 0–0.5)
EOSINOPHIL NFR BLD: 4.2 % (ref 0–8)
ERYTHROCYTE [DISTWIDTH] IN BLOOD BY AUTOMATED COUNT: 13 % (ref 11.5–14.5)
EST. GFR  (NO RACE VARIABLE): >60 ML/MIN/1.73 M^2
GLUCOSE SERPL-MCNC: 86 MG/DL (ref 70–110)
HCT VFR BLD AUTO: 34.4 % (ref 40–54)
HGB BLD-MCNC: 11.6 G/DL (ref 14–18)
IMM GRANULOCYTES # BLD AUTO: 0.01 K/UL (ref 0–0.04)
IMM GRANULOCYTES NFR BLD AUTO: 0.1 % (ref 0–0.5)
LYMPHOCYTES # BLD AUTO: 2.9 K/UL (ref 1–4.8)
LYMPHOCYTES NFR BLD: 41.9 % (ref 18–48)
MCH RBC QN AUTO: 30.1 PG (ref 27–31)
MCHC RBC AUTO-ENTMCNC: 33.7 G/DL (ref 32–36)
MCV RBC AUTO: 89 FL (ref 82–98)
MONOCYTES # BLD AUTO: 0.7 K/UL (ref 0.3–1)
MONOCYTES NFR BLD: 9.4 % (ref 4–15)
NEUTROPHILS # BLD AUTO: 3 K/UL (ref 1.8–7.7)
NEUTROPHILS NFR BLD: 44.1 % (ref 38–73)
NRBC BLD-RTO: 0 /100 WBC
PLATELET # BLD AUTO: 183 K/UL (ref 150–450)
PMV BLD AUTO: 9.9 FL (ref 9.2–12.9)
POTASSIUM SERPL-SCNC: 3.2 MMOL/L (ref 3.5–5.1)
PROT SERPL-MCNC: 6.2 G/DL (ref 6–8.4)
RBC # BLD AUTO: 3.86 M/UL (ref 4.6–6.2)
SODIUM SERPL-SCNC: 140 MMOL/L (ref 136–145)
WBC # BLD AUTO: 6.9 K/UL (ref 3.9–12.7)

## 2024-06-28 PROCEDURE — 85025 COMPLETE CBC W/AUTO DIFF WBC: CPT | Performed by: STUDENT IN AN ORGANIZED HEALTH CARE EDUCATION/TRAINING PROGRAM

## 2024-06-28 PROCEDURE — 80053 COMPREHEN METABOLIC PANEL: CPT | Performed by: STUDENT IN AN ORGANIZED HEALTH CARE EDUCATION/TRAINING PROGRAM

## 2024-06-28 PROCEDURE — 36415 COLL VENOUS BLD VENIPUNCTURE: CPT | Performed by: STUDENT IN AN ORGANIZED HEALTH CARE EDUCATION/TRAINING PROGRAM

## 2024-06-28 PROCEDURE — 25000003 PHARM REV CODE 250: Performed by: INTERNAL MEDICINE

## 2024-06-28 PROCEDURE — 25000003 PHARM REV CODE 250: Performed by: STUDENT IN AN ORGANIZED HEALTH CARE EDUCATION/TRAINING PROGRAM

## 2024-06-28 RX ORDER — POTASSIUM CHLORIDE 20 MEQ/1
40 TABLET, EXTENDED RELEASE ORAL ONCE
Status: COMPLETED | OUTPATIENT
Start: 2024-06-28 | End: 2024-06-28

## 2024-06-28 RX ORDER — LEVOFLOXACIN 500 MG/1
500 TABLET, FILM COATED ORAL DAILY
Qty: 10 TABLET | Refills: 0 | Status: SHIPPED | OUTPATIENT
Start: 2024-06-28 | End: 2024-07-08

## 2024-06-28 RX ORDER — HYDROCODONE BITARTRATE AND ACETAMINOPHEN 5; 325 MG/1; MG/1
1 TABLET ORAL EVERY 6 HOURS PRN
Qty: 10 TABLET | Refills: 0 | Status: SHIPPED | OUTPATIENT
Start: 2024-06-28

## 2024-06-28 RX ADMIN — GABAPENTIN 100 MG: 100 CAPSULE ORAL at 03:06

## 2024-06-28 RX ADMIN — GABAPENTIN 100 MG: 100 CAPSULE ORAL at 09:06

## 2024-06-28 RX ADMIN — LEVETIRACETAM 500 MG: 500 TABLET, FILM COATED ORAL at 09:06

## 2024-06-28 RX ADMIN — DOXYCYCLINE 100 MG: 100 INJECTION, POWDER, LYOPHILIZED, FOR SOLUTION INTRAVENOUS at 10:06

## 2024-06-28 RX ADMIN — DOXYCYCLINE 100 MG: 100 INJECTION, POWDER, LYOPHILIZED, FOR SOLUTION INTRAVENOUS at 12:06

## 2024-06-28 RX ADMIN — POTASSIUM BICARBONATE 35 MEQ: 391 TABLET, EFFERVESCENT ORAL at 10:06

## 2024-06-28 RX ADMIN — BACLOFEN 10 MG: 10 TABLET ORAL at 09:06

## 2024-06-28 RX ADMIN — POTASSIUM CHLORIDE 40 MEQ: 1500 TABLET, EXTENDED RELEASE ORAL at 03:06

## 2024-06-28 RX ADMIN — BACLOFEN 10 MG: 10 TABLET ORAL at 03:06

## 2024-06-28 NOTE — PLAN OF CARE
Discharge orders and chart reviewed with no further post-acute discharge needs identified at this time.     Pt is discharging home with no needs. Pt's step father and mother will transport Pt home.    At this time, patient is cleared for discharge from Case Management standpoint.         06/28/24 1132   Final Note   Assessment Type Final Discharge Note   Anticipated Discharge Disposition Home   What phone number can be called within the next 1-3 days to see how you are doing after discharge? [phone]   Post-Acute Status   Coverage MEDICARE - MEDICARE PART A & B   Discharge Delays None known at this time

## 2024-06-28 NOTE — ASSESSMENT & PLAN NOTE
leukocytosis and scrotum tender to palpation we will empirically treat for acute epididymitis  Urinalysis does not seem consistent with UTI   No torsion seen on ultrasound  Leukocytosis resolved   CRP mildly elevated, sed rate negative   - received empiric Rocephin and doxycycline for 3 days, DC with PO Levaquin for 10 days   - Follow up gonorrhea/chlamydia DNA amplification, still pending

## 2024-06-28 NOTE — DISCHARGE SUMMARY
[facility] Medicine  Discharge Summary      Patient Name: Phong Fofana  MRN: [id number]  ALFRED: [id number]  Patient Class: IP- Inpatient  Admission Date: 6/26/2024  Hospital Length of Stay: 1 days  Discharge Date and Time:  06/28/2024   Attending Physician: Cyril Harry MD   Discharging Provider: Cyril Harry MD  Primary Care Provider: Anamika Primary Doctor    Primary Care Team: Networked reference to record PCT     HPI:   Patient is a 34-year-old male with history of seizure disorder, schizophrenia, aphasic at baseline, presents to the emergency room with complaints of groin pain.  Patient nonverbal, writes on paper in order to communicate.  Reports pain started after having sex tonight.  Reports never having symptoms like this before.  Denies any dysuria, penile discharge.  Reports tenderness to the scrotum.  Reports using protection.  Denies any chest pain, fever, chills, abdominal pain.  CT abdomen and pelvis showed small containing umbilical hernia, no  renal stones.  Scrotum ultrasound showed no findings of spermatic cord torsion, intra testicular mass or orchitis.  Obscuration of the left epididymis.  On admission patient noted to have elevated white blood count, severe pain, PEEWEE.  Patient being admitted to be treated for possible epididymitis.    * No surgery found *      Hospital Course:   Patient is a 34 year old male, who is aphasic came with bilateral groin pain. CT abdomen and testicular US was negative for acute findings, patient was started on Rocephin and Doxycycline for possible epididymitis. Patient was started on Rocephin and Doxycycline. Chlamydia and gonorrhea PCR still pending. Pain is improved today. Patient discharge home with 10 days of Levaquin.      Goals of Care Treatment Preferences:  Code Status: Full Code      Physical Exam  Vitals reviewed.   Constitutional:       General: He is in acute distress.   HENT:      Head: Atraumatic.    Cardiovascular:      Rate and Rhythm: Normal rate and regular rhythm.   Pulmonary:      Effort: No respiratory distress.      Breath sounds: No wheezing.   Abdominal:      General: There is no distension.      Tenderness: There is no abdominal tenderness.   Genitourinary:     Comments: both scrotum looks nonerythematous. No swelling. Tender. There is also tenderness on both groin with macular patch in the supra pubic area.   Musculoskeletal:      Comments: Left hand contracture    Skin:     General: Skin is warm and dry.   Neurological:      Mental Status: He is alert. Mental status is at baseline.      Comments: Aphasic, chronic      Neuro  Dysarthria  Aphasic chronically, follows with Neurology outpatient   Apparently this started after seizures?  Writes on paper to communicate       Renal/  * Testicular pain  leukocytosis and scrotum tender to palpation we will empirically treat for acute epididymitis  Urinalysis does not seem consistent with UTI   No torsion seen on ultrasound  Leukocytosis resolved   CRP mildly elevated, sed rate negative   - received empiric Rocephin and doxycycline for 3 days, DC with PO Levaquin for 10 days   - Follow up gonorrhea/chlamydia DNA amplification, still pending     PEEWEE (acute kidney injury)  Due to pre-renal   Resolved with IVF       Orthopedic  Contracture of hand  Chronic, follows with Neurology outpatient        Final Active Diagnoses:    Diagnosis Date Noted POA    PRINCIPAL PROBLEM:  Testicular pain [N50.819] 06/26/2024 Yes    PEEWEE (acute kidney injury) [N17.9] 06/26/2024 Yes    Dysarthria [R47.1] 03/21/2023 Yes    Contracture of hand [M24.549] 02/28/2023 Yes      Problems Resolved During this Admission:       Discharged Condition: good    Disposition: Home or Self Care    Follow Up:   Follow-up Information       Warren Anguiano PA-C. Go on 7/11/2024.    Specialty: Family Medicine  Why: at 1:40 PM for hospital follow up  Contact information:  [address]  [place] [address]  [phone]                           Patient Instructions:   No discharge procedures on file.    Significant Diagnostic Studies: Labs: CMP   Recent Labs   Lab 06/26/24  1503 06/27/24  0340 06/28/24  0341   * 140 140   K 3.2* 3.2* 3.2*   CL 99 105 107   CO2 22* 24 28   * 99 86   BUN 27* 26* 15   CREATININE 1.6* 1.3 0.9   CALCIUM 9.8 8.4* 8.3*   PROT 8.4 6.7 6.2   ALBUMIN 5.0 3.9 3.5   BILITOT 2.0* 1.5* 0.4   ALKPHOS 124 98 89   AST 20 17 14   ALT 18 14 13   ANIONGAP 14 11 5*    and CBC   Recent Labs   Lab 06/26/24  1503 06/27/24  0340 06/28/24  0341   WBC 20.69* 12.40 6.90   HGB 13.6* 11.7* 11.6*   HCT 37.5* 33.3* 34.4*    210 183       Pending Diagnostic Studies:       None           Medications:  Reconciled Home Medications:      Medication List        START taking these medications      levoFLOXacin 500 MG tablet  Commonly known as: LEVAQUIN  Take 1 tablet (500 mg total) by mouth once daily. for 10 days            CONTINUE taking these medications      baclofen 10 MG tablet  Commonly known as: LIORESAL  Take 10 mg by mouth 3 (three) times daily.     gabapentin 100 MG capsule  Commonly known as: NEURONTIN  Take 1 capsule (100 mg total) by mouth 3 (three) times daily.     HYDROcodone-acetaminophen 5-325 mg per tablet  Commonly known as: NORCO  Take 1 tablet by mouth every 6 (six) hours as needed for Pain.     levETIRAcetam 500 MG Tab  Commonly known as: KEPPRA  Take 500 mg by mouth 2 (two) times daily.     ondansetron 4 MG tablet  Commonly known as: ZOFRAN  Take 4 mg by mouth every 6 (six) hours as needed.     oxyCODONE 5 MG immediate release tablet  Commonly known as: ROXICODONE  Take 5 mg by mouth every 6 (six) hours as needed.     QUEtiapine 100 MG Tab  Commonly known as: SEROQUEL  Take 100 mg by mouth every evening.            STOP taking these medications      cefadroxil 500 MG Cap  Commonly known as: DURICEF     ENDOCET 5-325 mg per tablet  Generic drug:  oxyCODONE-acetaminophen     oxyCODONE-acetaminophen 7.5-325 mg per tablet  Commonly known as: PERCOCET              Indwelling Lines/Drains at time of discharge:   Lines/Drains/Airways       None                   Time spent on the discharge of patient: 40 minutes         Cyril Harry MD  Department of Hospital Medicine  [facility]

## 2024-06-28 NOTE — PLAN OF CARE
Pt clear for DC from case management standpoint. Discharging to home.       PCP apt scheduled and added to AVS       06/28/24 1021   Final Note   Assessment Type Final Discharge Note   Anticipated Discharge Disposition Home   Hospital Resources/Appts/Education Provided Appointments scheduled and added to AVS

## 2024-06-29 LAB
CHLAMYDIA, AMPLIFIED DNA: NEGATIVE
N GONORRHOEAE, AMPLIFIED DNA: NEGATIVE

## 2024-07-03 ENCOUNTER — PATIENT OUTREACH (OUTPATIENT)
Dept: ADMINISTRATIVE | Facility: CLINIC | Age: 34
End: 2024-07-03
Payer: MEDICARE

## 2024-07-03 ENCOUNTER — PATIENT MESSAGE (OUTPATIENT)
Dept: FAMILY MEDICINE | Facility: CLINIC | Age: 34
End: 2024-07-03
Payer: MEDICARE

## 2024-07-03 NOTE — PROGRESS NOTES
C3 nurse spoke with Phong Fofana ( mother)  for a TCC post hospital discharge follow up call. The patient has a scheduled HOSFU appointment with KENDRA Anguiano on 07/11/2024 @ 2079.    Message sent to PCP staff.

## 2024-08-23 NOTE — H&P
HPI:  This is a 32-year-old gentleman with an unusual past neurological history.  He is minimally verbal and history is provided by his stepfather.  He relates that in April 2022 while living in [place] the patient had multiple seizures and since that time has been minimally verbal with dysarthria, has severe photophobia, spasticity involving bilateral arms and hands, shuffling gait, screaming outbursts when his arms are touched, and diffuse neck, back, and extremity pain.  Per his father in law he was normal, employed, maintained a family prior to the seizure activity in April.  He has a history of schizophrenia and bipolar disorder.  He is currently taking Keppra, Quintipine, baclofen, and gabapentin.  MRI of the brain shows no abnormality.  An MRI of the cervical spine demonstrates left C3-4 facet hypertrophy with contiguous synovial cyst with mild-to-moderate effacement of the left hemicord and severe left C3-4 foraminal stenosis.  No cord signal change.  No other areas of neural element compromise in the cervical spine.     EMG/NC was attempted with all extremities with the patient was unable to tolerate     History of recreational drug use but denies alcohol marijuana or other drugs prior to seizure.  No history of febrile seizures, head trauma, CNS infection, family history of epilepsy.     Smoking status:  Smokes approximately half pack per day despite hand contractures      Interval history 12/19/2022:  The patient returns and is accompanied by his father in law.  After careful consideration he has elected to proceed with surgery as outlined below.  He reports increased left neck pain with radiation into the left arm.  He denies new neurologic complaints.      Neurologic exam is stable     MRI, CT, dynamic x-rays cervical spine reviewed.  Right disc protrusion with effacement of the right hemicord at C4-5.  Right foraminal stenosis C4-5.  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  Anterior listhesis C3 on C4 with no change in flexion-extension.    Analysis:  I reviewed the goals of C3-4, C4-5 laminectomy, foraminotomy, instrumented fusion in detail.  Garo and his power of  voiced understanding and reiterated the stated the goal of surgery as possible pain relief and possible improved left hand function.  No guarantees were given.  They voiced clear understanding that all of his other neurologic issues are unlikely to be improved with posterior cervical decompression with instrumented fusion.  I outlined the material risks, anticipated postoperative course, and treatment alternatives.  He declines a stronger analgesic for now.  Continue alternating with Tylenol and ibuprofen.  Discontinue ibuprofen 10 days prior to date of surgery.    1/17/23: No changes. Potential benefits and risks again reviewed with patient, mother in law present.            Past Medical History:   Diagnosis Date    Bipolar disorder, unspecified      Schizophrenia, unspecified      Seizures        History reviewed. No pertinent surgical history.     Review of Systems: All systems reviewed and are as above or otherwise negative.     General: well developed, well nourished, no distress.   Head: normocephalic, atraumatic  Eyes: pupils equal, round, reactive to light with accomodation, EOMI.   Neck: trachea midline. No JVD  Cardiovascular: No LE edema   Pulmonary: normal respirations, no signs of respiratory distress  Abdomen: soft, non-distended, not tender to palpation  Sensory: intact to light touch throughout  Extremities: No bruising, deformity  Skin: Skin is warm, dry and intact.     Motor Strength: Moves all extremities spontaneously with good tone.  Full strength upper and lower extremities. No abnormal movements seen.      Strength   Deltoids Triceps Biceps Wrist Extension Wrist Flexion Hand    Upper: R 5/5 5/5 5/5 Unable to assess Unable to assess Unable to assess     L 5/5 5/5 5/5  Unable to assess Unable to assess Unable to assess       Iliopsoas Quadriceps Knee  Flexion Tibialis  anterior Gastro- cnemius EHL   Lower: R 5/5 5/5 5/5 5/5 5/5 5/5     L 5/5 5/5 5/5 5/5 5/5 5/5      Neurologic: Alert and oriented. Thought content appropriate.  GCS: Motor: 6/Verbal: 5/Eyes: 4 GCS Total: 15  Mental Status: Awake, Alert, Oriented x 4  Language: No aphasia  Speech: No dysarthria  Cranial nerves: face symmetric, tongue midline, CN II-XII grossly intact.      Pronator Drift: no drift noted  Finger-to-nose: Intact bilaterally  DTR's: 2 + and symmetric in UE and LE  Mathew: absent  Clonus: absent  Babinski: absent     Gait: normal                  Tandem Gait: No difficulty                Able to walk on heels & toes     Cervical Spine  ROM: full                       Nontender to palpation     Lumbar Spine   ROM: full           Nontender to palpation     Pain on Hip ROM: Negative  Straight leg raise: negative bilaterally      SI Joint tenderness: Negative bilaterally          CAMILLE: Negative bilaterally  Thigh Thrust: Negative bilaterally  Gaenslen: Negative bilaterally     Significant Exam Findings:  Limited neuro exam, patient is minimally cooperative.  Appears to be oriented to to person place and time.  No obvious distress.  Verbal responses limited to a slurred yes or no.   Unable to assess Mathew's, clonus, Babinski.  Bilateral hand contractures without obvious muscle loss.  Grossly moves all extremities without focal deficit.  Reflexes 2/4 in all extremities.  Stands without assistance.  Shuffling, antalgic gait.     Significant Radiology Findings:  As above.  MRI cervical reviewed in detail with the patient and his father in law.     Analysis:  I explained to the patient and his father in law that the left C3-4 synovial cyst would not account for all the above symptoms.  I explained that it may correlate with left neck pain, left shoulder pain, and possibly some degree of gait disturbance.  His  seizure activity, speech difficulty, photophobia, bilateral hand contractures, diffuse back and right leg pain are not accounted for with this finding.  Consequently, it is unclear if laminectomy with resection of the cyst and instrumented fusion would be beneficial.  I did outline the potential benefits and risks and recommended they carefully consider if surgery is how they wished to proceed.  They plan to discuss further with their neurologist.  We will be glad to see him back for further discussion as needed.   intact

## 2024-09-30 PROBLEM — N17.9 AKI (ACUTE KIDNEY INJURY): Status: RESOLVED | Noted: 2024-06-26 | Resolved: 2024-09-30

## 2025-03-12 ENCOUNTER — HOSPITAL ENCOUNTER (EMERGENCY)
Facility: HOSPITAL | Age: 35
Discharge: HOME | End: 2025-03-12
Attending: STUDENT IN AN ORGANIZED HEALTH CARE EDUCATION/TRAINING PROGRAM
Payer: MEDICARE

## 2025-03-12 VITALS
WEIGHT: 185 LBS | DIASTOLIC BLOOD PRESSURE: 86 MMHG | RESPIRATION RATE: 20 BRPM | BODY MASS INDEX: 28.04 KG/M2 | TEMPERATURE: 99 F | HEIGHT: 68 IN | SYSTOLIC BLOOD PRESSURE: 128 MMHG | HEART RATE: 70 BPM | OXYGEN SATURATION: 100 %

## 2025-03-12 DIAGNOSIS — K04.7 DENTAL ABSCESS: Primary | ICD-10-CM

## 2025-03-12 PROCEDURE — 25000003 PHARM REV CODE 250: Performed by: NURSE PRACTITIONER

## 2025-03-12 PROCEDURE — 99284 EMERGENCY DEPT VISIT MOD MDM: CPT

## 2025-03-12 RX ORDER — CLINDAMYCIN HYDROCHLORIDE 300 MG/1
300 CAPSULE ORAL EVERY 6 HOURS
Qty: 40 CAPSULE | Refills: 0 | Status: SHIPPED | OUTPATIENT
Start: 2025-03-12 | End: 2025-03-22

## 2025-03-12 RX ORDER — DICLOFENAC SODIUM 50 MG/1
50 TABLET, DELAYED RELEASE ORAL 3 TIMES DAILY PRN
Qty: 20 TABLET | Refills: 2 | Status: SHIPPED | OUTPATIENT
Start: 2025-03-12

## 2025-03-12 RX ORDER — NAPROXEN 250 MG/1
500 TABLET ORAL
Status: COMPLETED | OUTPATIENT
Start: 2025-03-12 | End: 2025-03-12

## 2025-03-12 RX ADMIN — NAPROXEN 500 MG: 250 TABLET ORAL at 02:03

## 2025-03-12 NOTE — DISCHARGE INSTRUCTIONS
Taking medication as prescribed return to the ED for any worsening symptoms or any other concerns.  Please make an appoint with a dentist.

## 2025-03-12 NOTE — ED PROVIDER NOTES
"Encounter Date: 3/12/2025       History     Chief Complaint   Patient presents with    Dental Pain     A tooth broke off "a few days ago" and now has a possible infection and pain     Presents with complaint of dental pain.  Patient reports he broke a tooth off about 3 days ago.  He denies fever.  He does report pain.  He has not called a dentist.  This patient is nonverbal since he had a seizure.  He is typing all of his answers to my questions on his phone.  He is very cooperative and very kind.  He denies fever nausea or vomiting.  There is no facial swelling.      Review of patient's allergies indicates:  No Known Allergies  Past Medical History:   Diagnosis Date    Abnormal gait     rt foot turns out    Anxious depression     Back pain     Bipolar disorder, unspecified     Contracture of hand     rosi post seizure    Depression     Migraines     Neck pain     radiates to both arms numbness/tingling in both arms more on left arm    Nonverbal     since seizure    Photosensitivity     wears dark glasses    Schizophrenia, unspecified     Seizures     last witness seizure May 2022     Past Surgical History:   Procedure Laterality Date    CERVICAL LAMINECTOMY WITH SPINAL FUSION N/A 1/17/2023    Procedure: LAMINECTOMY, SPINE, CERVICAL, WITH FUSION;  Surgeon: Tobi Levin DO;  Location: [facility] OR;  Service: Neurosurgery;  Laterality: N/A;    FUSION OF CERVICAL SPINE BY POSTERIOR APPROACH N/A 1/17/2023    Procedure: FUSION, SPINE, CERVICAL, POSTERIOR APPROACH;  Surgeon: Tobi Levin DO;  Location: [facility] OR;  Service: Neurosurgery;  Laterality: N/A;  IOM, C-ARM, MICRO MEDTRONICS (TW notifed rep 1/12)  (POSTERIOR)    (C3-4,  C4-5, C5-6)  LAMINECTOMY WITH FUSION    SPINE SURGERY      TYMPANOSTOMY TUBE PLACEMENT      WRIST SURGERY Left     bone infection     No family history on file.  Social History[1]  Review of Systems   Constitutional:  Negative for chills and fever.   HENT:  Positive for dental problem. Negative " for drooling, facial swelling, sore throat and trouble swallowing.    Respiratory:  Negative for cough, shortness of breath and wheezing.    Cardiovascular:  Negative for chest pain, palpitations and leg swelling.   Gastrointestinal:  Negative for abdominal pain, diarrhea, nausea and vomiting.   Musculoskeletal:  Negative for back pain.   Skin:  Negative for rash.   Neurological:  Negative for weakness.       Physical Exam     Initial Vitals [03/12/25 1321]   BP Pulse Resp Temp SpO2   132/83 73 16 98.6 °F (37 °C) 96 %      MAP       --         Physical Exam    Constitutional: He appears well-developed and well-nourished.   HENT:   Head: Normocephalic. Mouth/Throat: Oropharynx is clear and moist.   The left upper rear tooth is fractured.  Approximately half of the tooth is missing.  He has some gum swelling.  His airway is patent.   Eyes: Conjunctivae are normal.   Neck: Neck supple.   Normal range of motion.  Cardiovascular:  Normal rate.           Pulmonary/Chest: Breath sounds normal.   Abdominal: Abdomen is soft. Bowel sounds are normal.   Musculoskeletal:         General: Normal range of motion.      Cervical back: Normal range of motion and neck supple.      Comments: Patient's gait is steady.  He is ambulatory per self.     Neurological: He is alert and oriented to person, place, and time. No sensory deficit. GCS score is 15. GCS eye subscore is 4. GCS verbal subscore is 5. GCS motor subscore is 6.   Skin: Skin is warm. Capillary refill takes less than 2 seconds.   Psychiatric: He has a normal mood and affect. Thought content normal.         ED Course   Procedures  Labs Reviewed - No data to display       Imaging Results    None          Medications   naproxen tablet 500 mg (500 mg Oral Given 3/12/25 9698)     Medical Decision Making  Presents with complaint of dental pain.  Patient reports she broke off 2 per proximally 3 days ago.  He denies fever.  He does report pain.  He has not called a dentist.  He has  no difficulty swallowing.  There is no facial swelling.    Risk  Prescription drug management.  Risk Details: Patient was given naproxen here in the ED. I have written a prescription for diclofenac and clindamycin.  He has been instructed to follow up with a dentist.  Patient was able to swallow his medication easily without any trouble.  He is given return precautions.  At no time while in the ED did he ever appear to be in any acute distress.                                      Clinical Impression:  Final diagnoses:  [K04.7] Dental abscess (Primary)          ED Disposition Condition    Discharge Stable          ED Prescriptions       Medication Sig Dispense Start Date End Date Auth. Provider    diclofenac (VOLTAREN) 50 MG EC tablet Take 1 tablet (50 mg total) by mouth 3 (three) times daily as needed. 20 tablet 3/12/2025 -- Letitia Tobin NP    clindamycin (CLEOCIN) 300 MG capsule Take 1 capsule (300 mg total) by mouth every 6 (six) hours. for 10 days 40 capsule 3/12/2025 3/22/2025 Letitia Tobin NP          Follow-up Information       Follow up With Specialties Details Why Contact Info        Make a follow-up appoint with a dentist of your choice.               [1]   Social History  Tobacco Use    Smoking status: Former     Current packs/day: 0.00     Average packs/day: 2.0 packs/day for 23.0 years (46.0 ttl pk-yrs)     Types: Cigarettes     Start date:      Quit date: 2023     Years since quittin.1    Smokeless tobacco: Never   Substance Use Topics    Alcohol use: Not Currently    Drug use: Never        Letitia Tobin NP  [id number]

## 2025-04-07 ENCOUNTER — HOSPITAL ENCOUNTER (EMERGENCY)
Facility: HOSPITAL | Age: 35
Discharge: HOME | End: 2025-04-07
Attending: EMERGENCY MEDICINE
Payer: MEDICARE

## 2025-04-07 VITALS
OXYGEN SATURATION: 98 % | TEMPERATURE: 98 F | HEART RATE: 68 BPM | RESPIRATION RATE: 16 BRPM | SYSTOLIC BLOOD PRESSURE: 126 MMHG | DIASTOLIC BLOOD PRESSURE: 78 MMHG

## 2025-04-07 DIAGNOSIS — R46.2 BIZARRE BEHAVIOR: Primary | ICD-10-CM

## 2025-04-07 DIAGNOSIS — Z00.8 MEDICAL CLEARANCE FOR PSYCHIATRIC ADMISSION: ICD-10-CM

## 2025-04-07 LAB
ABSOLUTE EOSINOPHIL (SMH): 0.48 K/UL
ABSOLUTE MONOCYTE (SMH): 0.75 K/UL (ref 0.3–1)
ABSOLUTE NEUTROPHIL COUNT (SMH): 4.9 K/UL (ref 1.8–7.7)
ALBUMIN SERPL-MCNC: 4.2 G/DL (ref 3.5–5.2)
ALP SERPL-CCNC: 95 UNIT/L (ref 55–135)
ALT SERPL-CCNC: 20 UNIT/L (ref 10–44)
AMPHET UR QL SCN: NEGATIVE
ANION GAP (SMH): 8 MMOL/L (ref 8–16)
APAP SERPL-MCNC: 0.4 UG/ML (ref 10–20)
AST SERPL-CCNC: 21 UNIT/L (ref 10–40)
BARBITURATE SCN PRESENT UR: NEGATIVE
BASOPHILS # BLD AUTO: 0.05 K/UL
BASOPHILS NFR BLD AUTO: 0.6 %
BENZODIAZ UR QL SCN: NEGATIVE
BILIRUB SERPL-MCNC: 0.5 MG/DL (ref 0.1–1)
BILIRUB UR QL STRIP.AUTO: NEGATIVE
BUN SERPL-MCNC: 16 MG/DL (ref 6–20)
CALCIUM SERPL-MCNC: 8.9 MG/DL (ref 8.7–10.5)
CANNABINOIDS UR QL SCN: ABNORMAL
CHLORIDE SERPL-SCNC: 101 MMOL/L (ref 95–110)
CLARITY UR: CLEAR
CO2 SERPL-SCNC: 27 MMOL/L (ref 23–29)
COCAINE UR QL SCN: NEGATIVE
COLOR UR AUTO: YELLOW
CREAT SERPL-MCNC: 1.1 MG/DL (ref 0.5–1.4)
CREAT UR-MCNC: 115.4 MG/DL (ref 23–375)
ERYTHROCYTE [DISTWIDTH] IN BLOOD BY AUTOMATED COUNT: 13 % (ref 11.5–14.5)
ETHANOL SERPL-MCNC: <10 MG/DL
GFR SERPLBLD CREATININE-BSD FMLA CKD-EPI: >60 ML/MIN/1.73/M2
GLUCOSE SERPL-MCNC: 115 MG/DL (ref 70–110)
GLUCOSE UR QL STRIP: NEGATIVE
HCT VFR BLD AUTO: 37.8 % (ref 40–54)
HGB BLD-MCNC: 13.1 GM/DL (ref 14–18)
HGB UR QL STRIP: NEGATIVE
IMM GRANULOCYTES # BLD AUTO: 0.03 K/UL (ref 0–0.04)
IMM GRANULOCYTES NFR BLD AUTO: 0.3 % (ref 0–0.5)
KETONES UR QL STRIP: NEGATIVE
LEUKOCYTE ESTERASE UR QL STRIP: NEGATIVE
LYMPHOCYTES # BLD AUTO: 2.76 K/UL (ref 1–4.8)
MCH RBC QN AUTO: 30.2 PG (ref 27–31)
MCHC RBC AUTO-ENTMCNC: 34.7 G/DL (ref 32–36)
MCV RBC AUTO: 87 FL (ref 82–98)
NITRITE UR QL STRIP: NEGATIVE
NUCLEATED RBC (/100WBC) (SMH): 0 /100 WBC
OPIATES UR QL SCN: ABNORMAL
PCP UR QL: NEGATIVE
PH UR STRIP: 6 [PH]
PLATELET # BLD AUTO: 238 K/UL (ref 150–450)
PMV BLD AUTO: 9.7 FL (ref 9.2–12.9)
POTASSIUM SERPL-SCNC: 3.3 MMOL/L (ref 3.5–5.1)
PROT SERPL-MCNC: 7 GM/DL (ref 6–8.4)
PROT UR QL STRIP: NEGATIVE
RBC # BLD AUTO: 4.34 M/UL (ref 4.6–6.2)
RELATIVE EOSINOPHIL (SMH): 5.4 % (ref 0–8)
RELATIVE LYMPHOCYTE (SMH): 30.9 % (ref 18–48)
RELATIVE MONOCYTE (SMH): 8.4 % (ref 4–15)
RELATIVE NEUTROPHIL (SMH): 54.4 % (ref 38–73)
SALICYLATES SERPL-MCNC: <1.5 MG/DL (ref 15–30)
SODIUM SERPL-SCNC: 136 MMOL/L (ref 136–145)
SP GR UR STRIP: 1.01
TSH SERPL-ACNC: 0.53 UIU/ML (ref 0.34–5.6)
UROBILINOGEN UR STRIP-ACNC: NEGATIVE EU/DL
WBC # BLD AUTO: 8.94 K/UL (ref 3.9–12.7)

## 2025-04-07 PROCEDURE — 82077 ASSAY SPEC XCP UR&BREATH IA: CPT | Performed by: EMERGENCY MEDICINE

## 2025-04-07 PROCEDURE — 80143 DRUG ASSAY ACETAMINOPHEN: CPT | Performed by: EMERGENCY MEDICINE

## 2025-04-07 PROCEDURE — 36415 COLL VENOUS BLD VENIPUNCTURE: CPT | Performed by: EMERGENCY MEDICINE

## 2025-04-07 PROCEDURE — 84443 ASSAY THYROID STIM HORMONE: CPT | Performed by: EMERGENCY MEDICINE

## 2025-04-07 PROCEDURE — 80179 DRUG ASSAY SALICYLATE: CPT | Performed by: EMERGENCY MEDICINE

## 2025-04-07 PROCEDURE — 80307 DRUG TEST PRSMV CHEM ANLYZR: CPT | Performed by: EMERGENCY MEDICINE

## 2025-04-07 PROCEDURE — 80053 COMPREHEN METABOLIC PANEL: CPT | Performed by: EMERGENCY MEDICINE

## 2025-04-07 PROCEDURE — 85025 COMPLETE CBC W/AUTO DIFF WBC: CPT | Performed by: EMERGENCY MEDICINE

## 2025-04-07 PROCEDURE — 81003 URINALYSIS AUTO W/O SCOPE: CPT | Performed by: EMERGENCY MEDICINE

## 2025-04-07 PROCEDURE — 99284 EMERGENCY DEPT VISIT MOD MDM: CPT | Mod: 25

## 2025-04-07 PROCEDURE — 93010 ELECTROCARDIOGRAM REPORT: CPT | Mod: ,,, | Performed by: INTERNAL MEDICINE

## 2025-04-07 PROCEDURE — 93005 ELECTROCARDIOGRAM TRACING: CPT | Performed by: INTERNAL MEDICINE

## 2025-04-07 NOTE — ED PROVIDER NOTES
"Encounter Date: 4/7/2025       History     Chief Complaint   Patient presents with    Mental Health Problem     Found on side of road shouting. Verbally aggressive with EMS     Emergent evaluation of a 35-year-old male with history of anxiety, depression, bipolar disorder, migraines, schizophrenia, seizure disorder, per notes nonverbal since past seizure, with contractures of hands presents to the ER by EMS and police department due to bizarre behavior for psychiatric evaluation patient was reportedly found kneeling praying on the side of the road in a remote area.  Patient is not able to answering questions verbally and was mildly aggressive and noncooperative so was brought to the ER.  Here patient indicates to us that he is unable to talk but that was able to write his 1st name and date of birth on a post it note.   He wrote " not supposed to talk" and " wilmer" on the note as well as "Phong SALAMANCA ".  Tattoo on chest gave last name for registration information.   He reports no pain including chest pain shortness of breath abdominal pain no nausea no headache no weakness denies complaints      Review of patient's allergies indicates:  No Known Allergies  Past Medical History:   Diagnosis Date    Abnormal gait     rt foot turns out    Anxious depression     Back pain     Bipolar disorder, unspecified     Contracture of hand     rosi post seizure    Depression     Migraines     Neck pain     radiates to both arms numbness/tingling in both arms more on left arm    Nonverbal     since seizure    Photosensitivity     wears dark glasses    Schizophrenia, unspecified     Seizures     last witness seizure May 2022     Past Surgical History:   Procedure Laterality Date    CERVICAL LAMINECTOMY WITH SPINAL FUSION N/A 1/17/2023    Procedure: LAMINECTOMY, SPINE, CERVICAL, WITH FUSION;  Surgeon: Tobi Levin DO;  Location: [facility] OR;  Service: Neurosurgery;  Laterality: N/A;    FUSION OF CERVICAL SPINE BY POSTERIOR APPROACH " N/A 1/17/2023    Procedure: FUSION, SPINE, CERVICAL, POSTERIOR APPROACH;  Surgeon: Tobi Levin DO;  Location: [facility] OR;  Service: Neurosurgery;  Laterality: N/A;  IOM, C-ARM, MICRO MEDTRONICS (TW notifed rep 1/12)  (POSTERIOR)    (C3-4,  C4-5, C5-6)  LAMINECTOMY WITH FUSION    SPINE SURGERY      TYMPANOSTOMY TUBE PLACEMENT      WRIST SURGERY Left     bone infection     No family history on file.  Social History[1]  Review of Systems   Unable to perform ROS: Patient nonverbal       Physical Exam     Initial Vitals [04/07/25 0228]   BP Pulse Resp Temp SpO2   (!) 143/84 85 20 98.2 °F (36.8 °C) 97 %      MAP       --         Physical Exam    Nursing note and vitals reviewed.  Constitutional: He appears well-developed and well-nourished. He is not diaphoretic. No distress.   HENT:   Head: Normocephalic and atraumatic.   Right Ear: External ear normal.   Left Ear: External ear normal.   Nose: Nose normal. Mouth/Throat: Oropharynx is clear and moist.   Eyes: Conjunctivae and EOM are normal. Pupils are equal, round, and reactive to light.   Neck: Neck supple. No tracheal deviation present.   Normal range of motion.  Cardiovascular:  Normal rate, regular rhythm, normal heart sounds and intact distal pulses.     Exam reveals no gallop and no friction rub.       No murmur heard.  Pulmonary/Chest: Breath sounds normal. No stridor. No respiratory distress. He has no wheezes. He has no rhonchi. He has no rales. He exhibits no tenderness.   Abdominal: Abdomen is soft. Bowel sounds are normal. He exhibits no distension and no mass. There is no abdominal tenderness. There is no rebound and no guarding.   Musculoskeletal:         General: No edema. Normal range of motion.      Cervical back: Normal range of motion and neck supple.     Neurological: He is alert and oriented to person, place, and time. He has normal strength. No cranial nerve deficit or sensory deficit.   Skin: Skin is warm and dry. No rash noted. No erythema. No  pallor.   Psychiatric: He has a normal mood and affect. His behavior is normal. Judgment and thought content normal.         ED Course   Procedures  Labs Reviewed   COMPREHENSIVE METABOLIC PANEL - Abnormal       Result Value    Sodium 136      Potassium 3.3 (*)     Chloride 101      CO2 27      Glucose 115 (*)     BUN 16      Creatinine 1.1      Calcium 8.9      Protein Total 7.0      Albumin 4.2      Bilirubin Total 0.5      ALP 95      AST 21      ALT 20      Anion Gap 8      eGFR >60     ACETAMINOPHEN LEVEL - Abnormal    Acetaminophen Level 0.4 (*)    SALICYLATE LEVEL - Abnormal    Salicylate Level <1.5 (*)    CBC WITH DIFFERENTIAL - Abnormal    WBC 8.94      RBC 4.34 (*)     Hgb 13.1 (*)     Hct 37.8 (*)     MCV 87      MCH 30.2      MCHC 34.7      RDW 13.0      Platelet Count 238      MPV 9.7      Nucleated RBC 0      Neut % 54.4      Lymph % 30.9      Mono % 8.4      Eos % 5.4      Basophil % 0.6      Imm Grans % 0.3      Neut # 4.9      Lymph # 2.76      Mono # 0.75      Eos # 0.48      Baso # 0.05      Imm Grans # 0.03     ALCOHOL,MEDICAL (ETHANOL) - Normal    Alcohol, Serum <10     TSH - Normal    TSH 0.529     CBC W/ AUTO DIFFERENTIAL    Narrative:     The following orders were created for panel order CBC auto differential.  Procedure                               Abnormality         Status                     ---------                               -----------         ------                     CBC with Differential[[id number]]       Abnormal            Final result                 Please view results for these tests on the individual orders.   URINALYSIS, REFLEX TO URINE CULTURE   DRUG SCREEN PANEL, URINE EMERGENCY   EXTRA TUBES    Narrative:     The following orders were created for panel order EXTRA TUBES.  Procedure                               Abnormality         Status                     ---------                               -----------         ------                     Light Green Top  "Hold[[id number]]                            In process                 Lavender Top Hold[[id number]]                               In process                 Gold Top Hold[[id number]]                                   In process                   Please view results for these tests on the individual orders.   LIGHT GREEN TOP HOLD   LAVENDER TOP HOLD   GOLD TOP HOLD          Imaging Results    None          Medications - No data to display  Medical Decision Making  Emergent evaluation of a 35-year-old male with history of anxiety, depression, bipolar disorder, migraines, schizophrenia, seizure disorder, per notes nonverbal since past seizure, with contractures of hands presents to the ER by EMS and police department due to bizarre behavior for psychiatric evaluation patient was reportedly found kneeling praying on the side of the road in a remote area.  Patient is not able to answering questions verbally and was mildly aggressive and noncooperative so was brought to the ER.  Here patient indicates to us that he is unable to talk but that was able to write his 1st name and date of birth on a post it note.   He wrote " not supposed to talk" and " wilmer" on the note as well as "Christopher L ".  Tattoo on chest gave last name for registration information.   He reports no pain including chest pain shortness of breath abdominal pain no nausea no headache no weakness denies complaints  On physical exam he has normal vital signs.  Makes eye contact difficulty with verbalization able to write legibly above statements.  Normal cardiac and lung exam soft nontender abdomen no pallor.  Contractions bilateral hand.  Does not appear to be responding to internal stimuli denies suicidal or homicidal ideations no hallucinations  MDM    Patient presents for emergent evaluation of acute bizarre behavior mildly agitated non verbal with EMS and police that poses a possible threat to life and/or bodily function.    Differential " diagnosis includes but is not limited to acute psychosis schizophrenia with paranoia or delusions, substance abuse or withdrawal, infection electrolyte abnormalities  In the ED patient found to have acute bizarre behavior   I ordered labs and personally reviewed them.  Labs significant for see below     I ordered X-rays and personally reviewed them and reviewed the radiologist interpretation.  Xray significant for see above.  I ordered EKG and personally reviewed it.  EKG significant for see above.      Discharge MDM  The response to treatment was good patient has been calm collected friendly here.  Patient has history of being aphasic since a seizure in 2022 but police reported that he spoke to them throughout his transport.  And here patient has verbalized to us several times on further talking to him the patient is able to speak in full sentences clearly and reports that he has been selectively mute for years.    Patient required emergent consultation to hospitalist for admission.  Jessica Chopra M.D.       Amount and/or Complexity of Data Reviewed  Labs: ordered.               ED Course as of 04/07/25 0318   Mon Apr 07, 2025   0240 I independently interpreted and reviewed EKG with normal sinus rhythm rate of 60 inverted T-wave in lead 3 otherwise normal [RM]   0242 Normal white count H&H 13.1 and 37.8 [RM]   0253 Alcohol Tylenol salicylates negative  CMP with potassium 3.3 glucose 115 otherwise normal   [RM]      ED Course User Index  [RM] Jessica Chopra MD                           Clinical Impression:  Final diagnoses:  [Z00.8] Medical clearance for psychiatric admission  [R46.2] Bizarre behavior (Primary)          ED Disposition Condition    Discharge Stable          ED Prescriptions    None       Follow-up Information       Follow up With Specialties Details Why Contact Info Additional Information    [facility] - Emergency Dept Emergency Medicine Go to  If symptoms worsen [address]  [facility] [address]  [phone] 1st floor               [1]   Social History  Tobacco Use    Smoking status: Former     Current packs/day: 0.00     Average packs/day: 2.0 packs/day for 23.0 years (46.0 ttl pk-yrs)     Types: Cigarettes     Start date:      Quit date: 2023     Years since quittin.2    Smokeless tobacco: Never   Substance Use Topics    Alcohol use: Not Currently    Drug use: Never        Jessica Chopra MD  25 [id number]

## 2025-04-08 LAB
OHS QRS DURATION: 114 MS
OHS QTC CALCULATION: 378 MS

## 2025-06-04 ENCOUNTER — CLINICAL SUPPORT (OUTPATIENT)
Dept: REHABILITATION | Facility: HOSPITAL | Age: 35
End: 2025-06-04
Payer: MEDICARE

## 2025-06-04 DIAGNOSIS — R41.3 OTHER AMNESIA: Primary | ICD-10-CM

## 2025-06-04 PROCEDURE — 96125 COGNITIVE TEST BY HC PRO: CPT | Mod: PO

## 2025-06-12 ENCOUNTER — CLINICAL SUPPORT (OUTPATIENT)
Dept: REHABILITATION | Facility: HOSPITAL | Age: 35
End: 2025-06-12
Payer: MEDICARE

## 2025-06-12 DIAGNOSIS — R41.841 COGNITIVE COMMUNICATION DEFICIT: Primary | ICD-10-CM

## 2025-06-12 PROCEDURE — 97130 THER IVNTJ EA ADDL 15 MIN: CPT | Mod: PO

## 2025-06-12 PROCEDURE — 97129 THER IVNTJ 1ST 15 MIN: CPT | Mod: PO

## 2025-06-13 NOTE — PROGRESS NOTES
"  Outpatient Rehab    Speech-Language Pathology Visit    Patient Name: Garo Fofana  MRN: [id number]  YOB: 1990  Encounter Date: 6/12/2025    Therapy Diagnosis:   Encounter Diagnosis   Name Primary?    Cognitive communication deficit Yes     Physician: Shagufta Solo NP    Physician Orders: Eval and Treat  Medical Diagnosis: Other amnesia  Surgical Diagnosis: Not applicable for this Episode   Surgical Date: Not applicable for this Episode    Visit # / Visits Authorized: 1 / 10   Insurance Authorization Period: 6/3/2025 to 12/31/2025  Date of Evaluation: 6/4/2025   Plan of Care Certification: 6/4/2025 to 7/30/2025      Time In: 0800   Time Out: 0845  Total Time (in minutes): 45   Total Billable Time (in minutes): 45      Subjective   Patient arrived on time and reports not sleeping well.  Pain reported as 9/10. Base of neck to base feet      Objective            Treatment  Cognitive Skills  Activity 1: Education and handout provided this date on WRAP memory strategies. Pt demonstrated understanding for use of strategies and stated he would "try" to use them. Demonstrated use of 'write' and 'associate' within session.  Activity 2: Pt completed alternating attention task level 1/8 and 2/8 with combined average of 94% accuracy and minimal cues to slow down throughout task to reduce errors  Activity 3: Pt with multiple off-topic conversations this date as well as off-task behaviors; attended to structured tasks with 60% accuracy and somewhat difficult to redirect    Time Entry(in minutes):  Cognitive Skill Development (Medicare) Time Entry: 45    Assessment & Plan   Assessment  Pt participated well in today's session focused on education and attention. Education and handout provided on WRAP (acnyj-fvbgfh-qkmfznyvq-picture) memory strategies. Pocket notebook also given in session for patient to utilize 'write'. With prompts, patient wrote down each strategy, to aid in recall. Rationale provided for use " of each strategy. Patient had difficulty sustaining attention to all tasks this date with multiple off-topic conversations. Pt completed alternating attention with high accuracy; however, cues needed to slow down in order to reduce errors. Current goals remain appropriate and will be updated as needed. Pt will continue to benefit from skilled speech therapy services.  Evaluation/Treatment Tolerance: Patient tolerated treatment well    The patient will continue to benefit from skilled outpatient speech therapy in order to address the deficits listed in the problem list on the initial evaluation, provide patient and family education, and maximize the patients level of independence in the home and community environments.     The patient's spiritual, cultural, and educational needs were considered, and the patient is agreeable to the plan of care and goals.          Plan  Continue ST POC          Goals:   Active       Long term goals       LTG 1 (Progressing)       Start:  06/05/25    Expected End:  07/30/25       Pt will improve  attention skills to effectively attend to and communicate in complex daily living tasks in functional living environment.          LTG 2 (Progressing)       Start:  06/05/25    Expected End:  07/30/25       Patient will use appropriate memory strategies to schedule and recall weekly activities, express needs and recall names to maintain safety and participate socially in functional living environment.             LTG 3 (Progressing)       Start:  06/05/25    Expected End:  07/30/25       Pt will demonstrate use of self awareness during daily living activities to improve safety and awareness in functional living environment.              Short term goals       STG 1 (Progressing)       Start:  06/05/25    Expected End:  07/30/25       Patient will complete sustained attention tasks in quiet environment with 80% accuracy independently.         STG 2 (Progressing)       Start:  06/05/25     Expected End:  07/30/25       Patient will demonstrate awareness of cognitive-communication difficulties in 1-2 situations in her day in which cognitive deficits could or did compromise efficiency and performance with minimal cueing.          STG 3 (Progressing)       Start:  06/05/25    Expected End:  07/30/25       Patient will complete alternating attention tasks in quiet environment with 80% accuracy independently.         STG 4 (Progressing)       Start:  06/05/25    Expected End:  07/30/25       Patient will recall 3/4 memory strategies independently 3 times overall and discuss how strategies are being actively implemented in the home/community         STG 5 (Progressing)       Start:  06/05/25    Expected End:  07/30/25       Patient will complete a simple task to improve attention and memory (I.e. sample bill paying activity, recipe, or multiple choice comprehension questions to 1 paragraphs) with 80% accuracy and natural environment noise distractions (TV news background, music, etc.).             Siria Carcamo, CF-SLP

## (undated) DEVICE — SUTURE 2-0 8X18 VICRYL PLUS CP-2

## (undated) DEVICE — TOOL MR8 14CM MATCH 3MM

## (undated) DEVICE — STRIP SKIN CLOSURE W1XL5IN WHITE NONWOVEN BACKING ADHESIVE

## (undated) DEVICE — UNDERGLOVE BIOGEL MICRO BLUE SZ 8.5

## (undated) DEVICE — SUTURE VICRYL 0 CT-2 27 J270H

## (undated) DEVICE — MASTISOL WITH SPRAY PUMP SURGICAL ADHESIVE 1/2 OZ

## (undated) DEVICE — ADHESIVE MASTISOL VIAL 0523-48

## (undated) DEVICE — SUTURE VICRYL 1 CT-1 J947H

## (undated) DEVICE — SUTURE ETHILON 2-0 BLK MONO FSLX 30

## (undated) DEVICE — DRAPE 3/4

## (undated) DEVICE — DRAPE MICROSCOPE FOR ZEISS 54 X 150

## (undated) DEVICE — BLADE ELECTRODE EDGE INSULATED 4  INCH

## (undated) DEVICE — LOTION PREP REMOVER 5OZ IODOPHOR

## (undated) DEVICE — Device

## (undated) DEVICE — SPONGE SURGIFOAM 8X12.5 1974

## (undated) DEVICE — PIN SKULL MAYFIELD A1072

## (undated) DEVICE — TRAY LAMINECTOMY SLIDELL MEMORIAL

## (undated) DEVICE — GOWN SURG. AERO CHROME XXL

## (undated) DEVICE — SOLUTION DURAPREP 8630

## (undated) DEVICE — TAPE POROUS CURITY STANDARD 4 INCH

## (undated) DEVICE — SYRINGE 20ML 302830

## (undated) DEVICE — TUBING SUCTION 12' ST2003

## (undated) DEVICE — PROTECTOR ULNAR NERVE PURPLE FOAM HOOK LOOP STRAP ANATOMICAL

## (undated) DEVICE — TAPE SURGICAL W2INXL10YD SOFT CLOTH WATER RESISTANT MEDIPORE

## (undated) DEVICE — DRAPE PEDIATRIC LAPAROTOMY 72X124

## (undated) DEVICE — YANKAUER SUCTION REGULAR FLEXIBLE W/O VENT 18FR.

## (undated) DEVICE — GOWN SURGICAL BASIC LG

## (undated) DEVICE — HEMOSTATIC FLOSEAL 5ML

## (undated) DEVICE — MARKER SKIN W/ RULER 77734

## (undated) DEVICE — GLOVE BIOGEL MICRO SURGEON PINK SZ 8

## (undated) DEVICE — PATTIE 1/2X3 80-1407

## (undated) DEVICE — COUNTER NEEDLE POP 1840 BLK 31142311

## (undated) DEVICE — PAD BOVIE ADULT

## (undated) DEVICE — POUCH INSTRUMENT 1018

## (undated) DEVICE — 2.4MM DRILL BIT

## (undated) DEVICE — STAPLER SKIN NON ROTATE PXW35